# Patient Record
Sex: FEMALE | Race: BLACK OR AFRICAN AMERICAN | Employment: OTHER | URBAN - METROPOLITAN AREA
[De-identification: names, ages, dates, MRNs, and addresses within clinical notes are randomized per-mention and may not be internally consistent; named-entity substitution may affect disease eponyms.]

---

## 2023-01-19 ENCOUNTER — HOSPITAL ENCOUNTER (INPATIENT)
Age: 78
LOS: 7 days | Discharge: HOME HEALTH | DRG: 637 | End: 2023-01-26
Attending: STUDENT IN AN ORGANIZED HEALTH CARE EDUCATION/TRAINING PROGRAM | Admitting: ANESTHESIOLOGY
Payer: MEDICARE

## 2023-01-19 ENCOUNTER — APPOINTMENT (OUTPATIENT)
Dept: GENERAL RADIOLOGY | Age: 78
DRG: 637 | End: 2023-01-19
Attending: NURSE PRACTITIONER
Payer: MEDICARE

## 2023-01-19 ENCOUNTER — HOSPITAL ENCOUNTER (EMERGENCY)
Age: 78
Discharge: ANOTHER HOSPITAL | End: 2023-01-19
Attending: STUDENT IN AN ORGANIZED HEALTH CARE EDUCATION/TRAINING PROGRAM
Payer: MEDICARE

## 2023-01-19 ENCOUNTER — APPOINTMENT (OUTPATIENT)
Dept: GENERAL RADIOLOGY | Age: 78
End: 2023-01-19
Attending: STUDENT IN AN ORGANIZED HEALTH CARE EDUCATION/TRAINING PROGRAM
Payer: MEDICARE

## 2023-01-19 ENCOUNTER — APPOINTMENT (OUTPATIENT)
Dept: CT IMAGING | Age: 78
End: 2023-01-19
Attending: STUDENT IN AN ORGANIZED HEALTH CARE EDUCATION/TRAINING PROGRAM
Payer: MEDICARE

## 2023-01-19 VITALS
HEIGHT: 63 IN | RESPIRATION RATE: 23 BRPM | SYSTOLIC BLOOD PRESSURE: 137 MMHG | WEIGHT: 200 LBS | OXYGEN SATURATION: 96 % | DIASTOLIC BLOOD PRESSURE: 91 MMHG | BODY MASS INDEX: 35.44 KG/M2 | HEART RATE: 82 BPM | TEMPERATURE: 98.5 F

## 2023-01-19 DIAGNOSIS — R56.9 SEIZURES (HCC): ICD-10-CM

## 2023-01-19 DIAGNOSIS — E11.65 TYPE 2 DIABETES MELLITUS WITH HYPERGLYCEMIA, UNSPECIFIED WHETHER LONG TERM INSULIN USE (HCC): ICD-10-CM

## 2023-01-19 DIAGNOSIS — R73.9 HYPERGLYCEMIA: ICD-10-CM

## 2023-01-19 DIAGNOSIS — E87.1: Primary | ICD-10-CM

## 2023-01-19 DIAGNOSIS — G40.901 NON-CONVULSIVE STATUS EPILEPTICUS (HCC): ICD-10-CM

## 2023-01-19 DIAGNOSIS — R56.9 SEIZURE (HCC): Primary | ICD-10-CM

## 2023-01-19 LAB
ABO + RH BLD: NORMAL
ADMINISTERED INITIALS, ADMINIT: NORMAL
ALBUMIN SERPL-MCNC: 3.3 G/DL (ref 3.5–5)
ALBUMIN SERPL-MCNC: 3.6 G/DL (ref 3.5–5)
ALBUMIN SERPL-MCNC: 3.8 G/DL (ref 3.5–5)
ALBUMIN/GLOB SERPL: 0.8 (ref 1.1–2.2)
ALBUMIN/GLOB SERPL: 1 (ref 1.1–2.2)
ALBUMIN/GLOB SERPL: 1 (ref 1.1–2.2)
ALP SERPL-CCNC: 480 U/L (ref 45–117)
ALP SERPL-CCNC: 533 U/L (ref 45–117)
ALP SERPL-CCNC: 535 U/L (ref 45–117)
ALT SERPL-CCNC: 298 U/L (ref 12–78)
ALT SERPL-CCNC: 348 U/L (ref 12–78)
ALT SERPL-CCNC: ABNORMAL U/L (ref 12–78)
ANION GAP SERPL CALC-SCNC: 10 MMOL/L (ref 5–15)
ANION GAP SERPL CALC-SCNC: 11 MMOL/L (ref 5–15)
ANION GAP SERPL CALC-SCNC: 12 MMOL/L (ref 5–15)
ANION GAP SERPL CALC-SCNC: 17 MMOL/L (ref 5–15)
ANION GAP SERPL CALC-SCNC: 7 MMOL/L (ref 5–15)
APPEARANCE UR: CLEAR
APTT PPP: 24.4 SEC (ref 21.2–34.1)
ARTERIAL PATENCY WRIST A: YES
AST SERPL W P-5'-P-CCNC: 809 U/L (ref 15–37)
AST SERPL W P-5'-P-CCNC: ABNORMAL U/L (ref 15–37)
AST SERPL-CCNC: 586 U/L (ref 15–37)
BACTERIA URNS QL MICRO: NEGATIVE /HPF
BASE EXCESS BLDA CALC-SCNC: 1.7 MMOL/L
BASE EXCESS BLDV CALC-SCNC: 0.2 MMOL/L (ref 0–3)
BASOPHILS # BLD: 0 K/UL (ref 0–0.1)
BASOPHILS NFR BLD: 1 % (ref 0–1)
BDY SITE: ABNORMAL
BDY SITE: ABNORMAL
BILIRUB SERPL-MCNC: 0.5 MG/DL (ref 0.2–1)
BILIRUB SERPL-MCNC: 0.8 MG/DL (ref 0.2–1)
BILIRUB SERPL-MCNC: 1.1 MG/DL (ref 0.2–1)
BILIRUB UR QL: NEGATIVE
BLOOD GROUP ANTIBODIES SERPL: NEGATIVE
BNP SERPL-MCNC: 229 PG/ML
BODY TEMPERATURE: 98.1
BUN SERPL-MCNC: 26 MG/DL (ref 6–20)
BUN SERPL-MCNC: 29 MG/DL (ref 6–20)
BUN SERPL-MCNC: 29 MG/DL (ref 6–20)
BUN SERPL-MCNC: 32 MG/DL (ref 6–20)
BUN SERPL-MCNC: 34 MG/DL (ref 6–20)
BUN/CREAT SERPL: 13 (ref 12–20)
BUN/CREAT SERPL: 13 (ref 12–20)
BUN/CREAT SERPL: 14 (ref 12–20)
BUN/CREAT SERPL: 14 (ref 12–20)
BUN/CREAT SERPL: 15 (ref 12–20)
CA-I BLD-MCNC: 8.5 MG/DL (ref 8.5–10.1)
CA-I BLD-MCNC: 9.2 MG/DL (ref 8.5–10.1)
CA-I BLD-MCNC: 9.6 MG/DL (ref 8.5–10.1)
CALCIUM SERPL-MCNC: 9 MG/DL (ref 8.5–10.1)
CALCIUM SERPL-MCNC: 9.3 MG/DL (ref 8.5–10.1)
CHLORIDE SERPL-SCNC: 75 MMOL/L (ref 97–108)
CHLORIDE SERPL-SCNC: 79 MMOL/L (ref 97–108)
CHLORIDE SERPL-SCNC: 85 MMOL/L (ref 97–108)
CHLORIDE SERPL-SCNC: 94 MMOL/L (ref 97–108)
CHLORIDE SERPL-SCNC: 99 MMOL/L (ref 97–108)
CK SERPL-CCNC: NORMAL U/L (ref 26–192)
CO2 SERPL-SCNC: 22 MMOL/L (ref 21–32)
CO2 SERPL-SCNC: 26 MMOL/L (ref 21–32)
CO2 SERPL-SCNC: 27 MMOL/L (ref 21–32)
CO2 SERPL-SCNC: 29 MMOL/L (ref 21–32)
CO2 SERPL-SCNC: 29 MMOL/L (ref 21–32)
COLOR UR: ABNORMAL
CREAT SERPL-MCNC: 2 MG/DL (ref 0.55–1.02)
CREAT SERPL-MCNC: 2.05 MG/DL (ref 0.55–1.02)
CREAT SERPL-MCNC: 2.06 MG/DL (ref 0.55–1.02)
CREAT SERPL-MCNC: 2.31 MG/DL (ref 0.55–1.02)
CREAT SERPL-MCNC: 2.68 MG/DL (ref 0.55–1.02)
D50 ADMINISTERED, D50ADM: 0 ML
D50 ORDER, D50ORD: 0 ML
DIFFERENTIAL METHOD BLD: ABNORMAL
EOSINOPHIL # BLD: 0 K/UL (ref 0–0.4)
EOSINOPHIL NFR BLD: 0 % (ref 0–7)
EPITH CASTS URNS QL MICRO: ABNORMAL /LPF
ERYTHROCYTE [DISTWIDTH] IN BLOOD BY AUTOMATED COUNT: 12.2 % (ref 11.5–14.5)
ERYTHROCYTE [DISTWIDTH] IN BLOOD BY AUTOMATED COUNT: 12.5 % (ref 11.5–14.5)
FIO2 ON VENT: 50 %
GAS FLOW.O2 O2 DELIVERY SYS: 2 L/MIN
GAS FLOW.O2 SETTING OXYMISER: 16
GLOBULIN SER CALC-MCNC: 3.4 G/DL (ref 2–4)
GLOBULIN SER CALC-MCNC: 4 G/DL (ref 2–4)
GLOBULIN SER CALC-MCNC: 4.7 G/DL (ref 2–4)
GLUCOSE BLD STRIP.AUTO-MCNC: 134 MG/DL (ref 65–117)
GLUCOSE BLD STRIP.AUTO-MCNC: 165 MG/DL (ref 65–117)
GLUCOSE BLD STRIP.AUTO-MCNC: 212 MG/DL (ref 65–117)
GLUCOSE BLD STRIP.AUTO-MCNC: 500 MG/DL (ref 65–117)
GLUCOSE BLD STRIP.AUTO-MCNC: 538 MG/DL (ref 65–117)
GLUCOSE BLD STRIP.AUTO-MCNC: 541 MG/DL (ref 65–117)
GLUCOSE BLD STRIP.AUTO-MCNC: >600 MG/DL (ref 65–100)
GLUCOSE BLD STRIP.AUTO-MCNC: >600 MG/DL (ref 65–100)
GLUCOSE BLD STRIP.AUTO-MCNC: >600 MG/DL (ref 65–117)
GLUCOSE SERPL-MCNC: 1157 MG/DL (ref 65–100)
GLUCOSE SERPL-MCNC: 1217 MG/DL (ref 65–100)
GLUCOSE SERPL-MCNC: 381 MG/DL (ref 65–100)
GLUCOSE SERPL-MCNC: 792 MG/DL (ref 65–100)
GLUCOSE SERPL-MCNC: ABNORMAL MG/DL (ref 65–100)
GLUCOSE UR STRIP.AUTO-MCNC: >1000 MG/DL
GLUCOSE, GLC: 134 MG/DL
GLUCOSE, GLC: 165 MG/DL
GLUCOSE, GLC: 212 MG/DL
GLUCOSE, GLC: 500 MG/DL
GLUCOSE, GLC: 538 MG/DL
GLUCOSE, GLC: 541 MG/DL
GLUCOSE, GLC: 601 MG/DL
GLUCOSE, GLC: 601 MG/DL
HCO3 BLDA-SCNC: 26 MMOL/L (ref 22–26)
HCO3 BLDV-SCNC: 29 MMOL/L (ref 24–25)
HCT VFR BLD AUTO: 33.7 % (ref 35–47)
HCT VFR BLD AUTO: 40.3 % (ref 35–47)
HGB BLD-MCNC: 11 G/DL (ref 11.5–16)
HGB BLD-MCNC: 12.3 G/DL (ref 11.5–16)
HGB UR QL STRIP: ABNORMAL
HIGH TARGET, HITG: 200 MG/DL
IMM GRANULOCYTES # BLD AUTO: 0 K/UL (ref 0–0.04)
IMM GRANULOCYTES NFR BLD AUTO: 1 % (ref 0–0.5)
INR PPP: 1 (ref 0.9–1.1)
INSULIN ADMINSTERED, INSADM: 10.8 UNITS/HOUR
INSULIN ADMINSTERED, INSADM: 16.2 UNITS/HOUR
INSULIN ADMINSTERED, INSADM: 19.2 UNITS/HOUR
INSULIN ADMINSTERED, INSADM: 23.9 UNITS/HOUR
INSULIN ADMINSTERED, INSADM: 26.4 UNITS/HOUR
INSULIN ADMINSTERED, INSADM: 3.6 UNITS/HOUR
INSULIN ADMINSTERED, INSADM: 5 UNITS/HOUR
INSULIN ADMINSTERED, INSADM: 9.1 UNITS/HOUR
INSULIN ORDER, INSORD: 10.8 UNITS/HOUR
INSULIN ORDER, INSORD: 16.2 UNITS/HOUR
INSULIN ORDER, INSORD: 19.2 UNITS/HOUR
INSULIN ORDER, INSORD: 23.9 UNITS/HOUR
INSULIN ORDER, INSORD: 26.4 UNITS/HOUR
INSULIN ORDER, INSORD: 3.6 UNITS/HOUR
INSULIN ORDER, INSORD: 5 UNITS/HOUR
INSULIN ORDER, INSORD: 9.1 UNITS/HOUR
KETONES UR QL STRIP.AUTO: NEGATIVE MG/DL
LACTATE SERPL-SCNC: 2.4 MMOL/L (ref 0.4–2)
LACTATE SERPL-SCNC: 3.8 MMOL/L (ref 0.4–2)
LEUKOCYTE ESTERASE UR QL STRIP.AUTO: NEGATIVE
LOW TARGET, LOT: 150 MG/DL
LYMPHOCYTES # BLD: 0.9 K/UL (ref 0.8–3.5)
LYMPHOCYTES NFR BLD: 11 % (ref 12–49)
MAGNESIUM SERPL-MCNC: 2 MG/DL (ref 1.6–2.4)
MAGNESIUM SERPL-MCNC: 2.3 MG/DL (ref 1.6–2.4)
MAGNESIUM SERPL-MCNC: 2.3 MG/DL (ref 1.6–2.4)
MCH RBC QN AUTO: 27.7 PG (ref 26–34)
MCH RBC QN AUTO: 28.4 PG (ref 26–34)
MCHC RBC AUTO-ENTMCNC: 30.5 G/DL (ref 30–36.5)
MCHC RBC AUTO-ENTMCNC: 32.6 G/DL (ref 30–36.5)
MCV RBC AUTO: 84.9 FL (ref 80–99)
MCV RBC AUTO: 93.1 FL (ref 80–99)
MINUTES UNTIL NEXT BG, NBG: 60 MIN
MONOCYTES # BLD: 0.7 K/UL (ref 0–1)
MONOCYTES NFR BLD: 8 % (ref 5–13)
MULTIPLIER, MUL: 0.02
MULTIPLIER, MUL: 0.03
MULTIPLIER, MUL: 0.04
MULTIPLIER, MUL: 0.05
MULTIPLIER, MUL: 0.06
MULTIPLIER, MUL: 0.06
NEUTS SEG # BLD: 6.7 K/UL (ref 1.8–8)
NEUTS SEG NFR BLD: 79 % (ref 32–75)
NITRITE UR QL STRIP.AUTO: NEGATIVE
NRBC # BLD: 0 K/UL (ref 0–0.01)
NRBC # BLD: 0 K/UL (ref 0–0.01)
NRBC BLD-RTO: 0 PER 100 WBC
NRBC BLD-RTO: 0 PER 100 WBC
ORDER INITIALS, ORDINIT: NORMAL
PCO2 BLDA: 41 MMHG (ref 35–45)
PCO2 BLDV: 68.9 MMHG (ref 41–51)
PEEP RESPIRATORY: 5
PERFORMED BY, TECHID: ABNORMAL
PH BLDA: 7.42 (ref 7.35–7.45)
PH BLDV: 7.25 (ref 7.32–7.42)
PH UR STRIP: 5.5 (ref 5–8)
PHOSPHATE SERPL-MCNC: 2 MG/DL (ref 2.6–4.7)
PHOSPHATE SERPL-MCNC: 3.2 MG/DL (ref 2.6–4.7)
PHOSPHATE SERPL-MCNC: 4.7 MG/DL (ref 2.6–4.7)
PLATELET # BLD AUTO: 299 K/UL (ref 150–400)
PLATELET # BLD AUTO: 357 K/UL (ref 150–400)
PMV BLD AUTO: 11.2 FL (ref 8.9–12.9)
PMV BLD AUTO: 12.2 FL (ref 8.9–12.9)
PO2 BLDA: 111 MMHG (ref 80–100)
PO2 BLDV: 68 MMHG (ref 25–40)
POTASSIUM SERPL-SCNC: 3.1 MMOL/L (ref 3.5–5.1)
POTASSIUM SERPL-SCNC: 3.5 MMOL/L (ref 3.5–5.1)
POTASSIUM SERPL-SCNC: 4.1 MMOL/L (ref 3.5–5.1)
POTASSIUM SERPL-SCNC: 4.3 MMOL/L (ref 3.5–5.1)
POTASSIUM SERPL-SCNC: ABNORMAL MMOL/L (ref 3.5–5.1)
PROT SERPL-MCNC: 6.7 G/DL (ref 6.4–8.2)
PROT SERPL-MCNC: 7.8 G/DL (ref 6.4–8.2)
PROT SERPL-MCNC: 8.3 G/DL (ref 6.4–8.2)
PROT UR STRIP-MCNC: NEGATIVE MG/DL
PROTHROMBIN TIME: 13.4 SEC (ref 11.9–14.6)
RBC # BLD AUTO: 3.97 M/UL (ref 3.8–5.2)
RBC # BLD AUTO: 4.33 M/UL (ref 3.8–5.2)
RBC #/AREA URNS HPF: ABNORMAL /HPF (ref 0–5)
SAO2 % BLD: 98 % (ref 92–97)
SAO2% DEVICE SAO2% SENSOR NAME: ABNORMAL
SAO2% DEVICE SAO2% SENSOR NAME: ABNORMAL
SERVICE CMNT-IMP: ABNORMAL
SODIUM SERPL-SCNC: 114 MMOL/L (ref 136–145)
SODIUM SERPL-SCNC: 120 MMOL/L (ref 136–145)
SODIUM SERPL-SCNC: 121 MMOL/L (ref 136–145)
SODIUM SERPL-SCNC: 132 MMOL/L (ref 136–145)
SODIUM SERPL-SCNC: 135 MMOL/L (ref 136–145)
SPECIMEN EXP DATE BLD: NORMAL
SPECIMEN SITE: ABNORMAL
SPECIMEN SITE: ABNORMAL
THERAPEUTIC RANGE,PTTT: NORMAL SEC (ref 82–109)
TROPONIN-HIGH SENSITIVITY: 61 NG/L (ref 0–51)
TROPONIN-HIGH SENSITIVITY: 64 NG/L (ref 0–51)
UROBILINOGEN UR QL STRIP.AUTO: 0.2 EU/DL (ref 0.2–1)
VT SETTING VENT: 370
WBC # BLD AUTO: 8.3 K/UL (ref 3.6–11)
WBC # BLD AUTO: 9.2 K/UL (ref 3.6–11)
WBC URNS QL MICRO: ABNORMAL /HPF (ref 0–4)

## 2023-01-19 PROCEDURE — 83605 ASSAY OF LACTIC ACID: CPT

## 2023-01-19 PROCEDURE — 82962 GLUCOSE BLOOD TEST: CPT

## 2023-01-19 PROCEDURE — 96374 THER/PROPH/DIAG INJ IV PUSH: CPT

## 2023-01-19 PROCEDURE — 82550 ASSAY OF CK (CPK): CPT

## 2023-01-19 PROCEDURE — 81001 URINALYSIS AUTO W/SCOPE: CPT

## 2023-01-19 PROCEDURE — 36415 COLL VENOUS BLD VENIPUNCTURE: CPT

## 2023-01-19 PROCEDURE — 36600 WITHDRAWAL OF ARTERIAL BLOOD: CPT

## 2023-01-19 PROCEDURE — 84100 ASSAY OF PHOSPHORUS: CPT

## 2023-01-19 PROCEDURE — 74011250636 HC RX REV CODE- 250/636

## 2023-01-19 PROCEDURE — 71045 X-RAY EXAM CHEST 1 VIEW: CPT

## 2023-01-19 PROCEDURE — 83735 ASSAY OF MAGNESIUM: CPT

## 2023-01-19 PROCEDURE — 70496 CT ANGIOGRAPHY HEAD: CPT

## 2023-01-19 PROCEDURE — 74011000250 HC RX REV CODE- 250: Performed by: NURSE PRACTITIONER

## 2023-01-19 PROCEDURE — 74011250636 HC RX REV CODE- 250/636: Performed by: NURSE PRACTITIONER

## 2023-01-19 PROCEDURE — 74011250636 HC RX REV CODE- 250/636: Performed by: ANESTHESIOLOGY

## 2023-01-19 PROCEDURE — 82803 BLOOD GASES ANY COMBINATION: CPT

## 2023-01-19 PROCEDURE — 93005 ELECTROCARDIOGRAM TRACING: CPT

## 2023-01-19 PROCEDURE — 65610000006 HC RM INTENSIVE CARE

## 2023-01-19 PROCEDURE — 86900 BLOOD TYPING SEROLOGIC ABO: CPT

## 2023-01-19 PROCEDURE — 99285 EMERGENCY DEPT VISIT HI MDM: CPT

## 2023-01-19 PROCEDURE — C9254 INJECTION, LACOSAMIDE: HCPCS | Performed by: NURSE PRACTITIONER

## 2023-01-19 PROCEDURE — 83036 HEMOGLOBIN GLYCOSYLATED A1C: CPT

## 2023-01-19 PROCEDURE — 74011000250 HC RX REV CODE- 250: Performed by: STUDENT IN AN ORGANIZED HEALTH CARE EDUCATION/TRAINING PROGRAM

## 2023-01-19 PROCEDURE — 74011000258 HC RX REV CODE- 258: Performed by: NURSE PRACTITIONER

## 2023-01-19 PROCEDURE — 80053 COMPREHEN METABOLIC PANEL: CPT

## 2023-01-19 PROCEDURE — 80048 BASIC METABOLIC PNL TOTAL CA: CPT

## 2023-01-19 PROCEDURE — 85025 COMPLETE CBC W/AUTO DIFF WBC: CPT

## 2023-01-19 PROCEDURE — 84484 ASSAY OF TROPONIN QUANT: CPT

## 2023-01-19 PROCEDURE — 70450 CT HEAD/BRAIN W/O DYE: CPT

## 2023-01-19 PROCEDURE — 31500 INSERT EMERGENCY AIRWAY: CPT

## 2023-01-19 PROCEDURE — 83880 ASSAY OF NATRIURETIC PEPTIDE: CPT

## 2023-01-19 PROCEDURE — 74011250637 HC RX REV CODE- 250/637: Performed by: NURSE PRACTITIONER

## 2023-01-19 PROCEDURE — 95816 EEG AWAKE AND DROWSY: CPT | Performed by: NURSE PRACTITIONER

## 2023-01-19 PROCEDURE — 85027 COMPLETE CBC AUTOMATED: CPT

## 2023-01-19 PROCEDURE — 95816 EEG AWAKE AND DROWSY: CPT | Performed by: PSYCHIATRY & NEUROLOGY

## 2023-01-19 PROCEDURE — 74011636637 HC RX REV CODE- 636/637: Performed by: NURSE PRACTITIONER

## 2023-01-19 PROCEDURE — 74011636637 HC RX REV CODE- 636/637: Performed by: STUDENT IN AN ORGANIZED HEALTH CARE EDUCATION/TRAINING PROGRAM

## 2023-01-19 PROCEDURE — 74011000636 HC RX REV CODE- 636: Performed by: STUDENT IN AN ORGANIZED HEALTH CARE EDUCATION/TRAINING PROGRAM

## 2023-01-19 PROCEDURE — 85610 PROTHROMBIN TIME: CPT

## 2023-01-19 PROCEDURE — 95706 EEG WO VID 2-12HR INTMT MNTR: CPT | Performed by: NURSE PRACTITIONER

## 2023-01-19 PROCEDURE — 74011250636 HC RX REV CODE- 250/636: Performed by: STUDENT IN AN ORGANIZED HEALTH CARE EDUCATION/TRAINING PROGRAM

## 2023-01-19 PROCEDURE — 85730 THROMBOPLASTIN TIME PARTIAL: CPT

## 2023-01-19 PROCEDURE — 96375 TX/PRO/DX INJ NEW DRUG ADDON: CPT

## 2023-01-19 RX ORDER — LEVETIRACETAM 500 MG/5ML
500 INJECTION, SOLUTION, CONCENTRATE INTRAVENOUS EVERY 12 HOURS
Status: DISCONTINUED | OUTPATIENT
Start: 2023-01-20 | End: 2023-01-20

## 2023-01-19 RX ORDER — IBUPROFEN 200 MG
4 TABLET ORAL AS NEEDED
Status: DISCONTINUED | OUTPATIENT
Start: 2023-01-19 | End: 2023-01-20

## 2023-01-19 RX ORDER — LEVETIRACETAM 500 MG/5ML
2000 INJECTION, SOLUTION, CONCENTRATE INTRAVENOUS EVERY 12 HOURS
Status: DISCONTINUED | OUTPATIENT
Start: 2023-01-19 | End: 2023-01-19 | Stop reason: HOSPADM

## 2023-01-19 RX ORDER — SODIUM CHLORIDE, SODIUM LACTATE, POTASSIUM CHLORIDE, CALCIUM CHLORIDE 600; 310; 30; 20 MG/100ML; MG/100ML; MG/100ML; MG/100ML
150 INJECTION, SOLUTION INTRAVENOUS CONTINUOUS
Status: DISCONTINUED | OUTPATIENT
Start: 2023-01-19 | End: 2023-01-19

## 2023-01-19 RX ORDER — NYSTATIN 100000 U/G
OINTMENT TOPICAL 2 TIMES DAILY
Status: DISCONTINUED | OUTPATIENT
Start: 2023-01-19 | End: 2023-01-26 | Stop reason: HOSPADM

## 2023-01-19 RX ORDER — MIDAZOLAM IN 0.9 % SOD.CHLORID 1 MG/ML
0-10 PLASTIC BAG, INJECTION (ML) INTRAVENOUS
Status: DISCONTINUED | OUTPATIENT
Start: 2023-01-19 | End: 2023-01-19 | Stop reason: HOSPADM

## 2023-01-19 RX ORDER — ONDANSETRON 2 MG/ML
4 INJECTION INTRAMUSCULAR; INTRAVENOUS
Status: DISCONTINUED | OUTPATIENT
Start: 2023-01-19 | End: 2023-01-26 | Stop reason: HOSPADM

## 2023-01-19 RX ORDER — IBUPROFEN 200 MG
4 TABLET ORAL AS NEEDED
Status: DISCONTINUED | OUTPATIENT
Start: 2023-01-19 | End: 2023-01-19 | Stop reason: HOSPADM

## 2023-01-19 RX ORDER — ACETAMINOPHEN 650 MG/1
650 SUPPOSITORY RECTAL
Status: DISCONTINUED | OUTPATIENT
Start: 2023-01-19 | End: 2023-01-26 | Stop reason: HOSPADM

## 2023-01-19 RX ORDER — PROPOFOL 10 MG/ML
0-50 VIAL (ML) INTRAVENOUS
Status: DISCONTINUED | OUTPATIENT
Start: 2023-01-19 | End: 2023-01-21

## 2023-01-19 RX ORDER — CHLORHEXIDINE GLUCONATE 0.12 MG/ML
15 RINSE ORAL EVERY 12 HOURS
Status: DISCONTINUED | OUTPATIENT
Start: 2023-01-19 | End: 2023-01-22

## 2023-01-19 RX ORDER — ACETAMINOPHEN 325 MG/1
650 TABLET ORAL
Status: DISCONTINUED | OUTPATIENT
Start: 2023-01-19 | End: 2023-01-26 | Stop reason: HOSPADM

## 2023-01-19 RX ORDER — ONDANSETRON 4 MG/1
4 TABLET, ORALLY DISINTEGRATING ORAL
Status: DISCONTINUED | OUTPATIENT
Start: 2023-01-19 | End: 2023-01-26 | Stop reason: HOSPADM

## 2023-01-19 RX ORDER — DEXTROSE MONOHYDRATE 100 MG/ML
0-250 INJECTION, SOLUTION INTRAVENOUS AS NEEDED
Status: DISCONTINUED | OUTPATIENT
Start: 2023-01-19 | End: 2023-01-19 | Stop reason: HOSPADM

## 2023-01-19 RX ORDER — ETOMIDATE 2 MG/ML
20 INJECTION INTRAVENOUS ONCE
Status: COMPLETED | OUTPATIENT
Start: 2023-01-19 | End: 2023-01-19

## 2023-01-19 RX ORDER — POTASSIUM CHLORIDE 14.9 MG/ML
10 INJECTION INTRAVENOUS
Status: COMPLETED | OUTPATIENT
Start: 2023-01-19 | End: 2023-01-20

## 2023-01-19 RX ORDER — POLYETHYLENE GLYCOL 3350 17 G/17G
17 POWDER, FOR SOLUTION ORAL DAILY PRN
Status: DISCONTINUED | OUTPATIENT
Start: 2023-01-19 | End: 2023-01-26 | Stop reason: HOSPADM

## 2023-01-19 RX ORDER — LABETALOL HCL 20 MG/4 ML
10 SYRINGE (ML) INTRAVENOUS ONCE
Status: COMPLETED | OUTPATIENT
Start: 2023-01-19 | End: 2023-01-19

## 2023-01-19 RX ORDER — DEXTROSE MONOHYDRATE AND SODIUM CHLORIDE 5; .45 G/100ML; G/100ML
75 INJECTION, SOLUTION INTRAVENOUS CONTINUOUS
Status: DISCONTINUED | OUTPATIENT
Start: 2023-01-19 | End: 2023-01-21

## 2023-01-19 RX ORDER — LORAZEPAM 2 MG/ML
2 INJECTION INTRAMUSCULAR ONCE
Status: COMPLETED | OUTPATIENT
Start: 2023-01-19 | End: 2023-01-19

## 2023-01-19 RX ORDER — LORAZEPAM 2 MG/ML
INJECTION INTRAMUSCULAR
Status: COMPLETED
Start: 2023-01-19 | End: 2023-01-19

## 2023-01-19 RX ORDER — SODIUM CHLORIDE 0.9 % (FLUSH) 0.9 %
5-40 SYRINGE (ML) INJECTION AS NEEDED
Status: DISCONTINUED | OUTPATIENT
Start: 2023-01-19 | End: 2023-01-26 | Stop reason: HOSPADM

## 2023-01-19 RX ORDER — POTASSIUM CHLORIDE 7.45 MG/ML
10 INJECTION INTRAVENOUS ONCE
Status: DISCONTINUED | OUTPATIENT
Start: 2023-01-19 | End: 2023-01-19 | Stop reason: HOSPADM

## 2023-01-19 RX ORDER — HEPARIN SODIUM 5000 [USP'U]/ML
5000 INJECTION, SOLUTION INTRAVENOUS; SUBCUTANEOUS EVERY 8 HOURS
Status: DISCONTINUED | OUTPATIENT
Start: 2023-01-19 | End: 2023-01-26 | Stop reason: HOSPADM

## 2023-01-19 RX ORDER — LACOSAMIDE 10 MG/ML
200 INJECTION, SOLUTION INTRAVENOUS ONCE
Status: COMPLETED | OUTPATIENT
Start: 2023-01-19 | End: 2023-01-19

## 2023-01-19 RX ORDER — SODIUM CHLORIDE 0.9 % (FLUSH) 0.9 %
5-40 SYRINGE (ML) INJECTION EVERY 8 HOURS
Status: DISCONTINUED | OUTPATIENT
Start: 2023-01-19 | End: 2023-01-26 | Stop reason: HOSPADM

## 2023-01-19 RX ORDER — FAMOTIDINE 20 MG/1
20 TABLET, FILM COATED ORAL DAILY
Status: DISCONTINUED | OUTPATIENT
Start: 2023-01-20 | End: 2023-01-22

## 2023-01-19 RX ORDER — ROCURONIUM BROMIDE 10 MG/ML
100 INJECTION, SOLUTION INTRAVENOUS
Status: COMPLETED | OUTPATIENT
Start: 2023-01-19 | End: 2023-01-19

## 2023-01-19 RX ADMIN — LEVETIRACETAM 500 MG: 100 INJECTION, SOLUTION, CONCENTRATE INTRAVENOUS at 23:08

## 2023-01-19 RX ADMIN — SODIUM CHLORIDE, PRESERVATIVE FREE 10 ML: 5 INJECTION INTRAVENOUS at 21:13

## 2023-01-19 RX ADMIN — PROPOFOL 25 MCG/KG/MIN: 10 INJECTION, EMULSION INTRAVENOUS at 16:42

## 2023-01-19 RX ADMIN — PROPOFOL 25 MCG/KG/MIN: 10 INJECTION, EMULSION INTRAVENOUS at 22:58

## 2023-01-19 RX ADMIN — ROCURONIUM BROMIDE 100 MG: 50 INJECTION, SOLUTION INTRAVENOUS at 14:57

## 2023-01-19 RX ADMIN — CHLORHEXIDINE GLUCONATE 15 ML: 1.2 RINSE ORAL at 21:05

## 2023-01-19 RX ADMIN — NYSTATIN OINTMENT: 100000 OINTMENT TOPICAL at 17:23

## 2023-01-19 RX ADMIN — SODIUM CHLORIDE, POTASSIUM CHLORIDE, SODIUM LACTATE AND CALCIUM CHLORIDE 1000 ML: 600; 310; 30; 20 INJECTION, SOLUTION INTRAVENOUS at 22:03

## 2023-01-19 RX ADMIN — LORAZEPAM 2 MG: 2 INJECTION INTRAMUSCULAR at 11:58

## 2023-01-19 RX ADMIN — SODIUM CHLORIDE 1000 ML: 9 INJECTION, SOLUTION INTRAVENOUS at 14:25

## 2023-01-19 RX ADMIN — SODIUM CHLORIDE 10.8 UNITS/HR: 9 INJECTION, SOLUTION INTRAVENOUS at 16:57

## 2023-01-19 RX ADMIN — SODIUM CHLORIDE, POTASSIUM CHLORIDE, SODIUM LACTATE AND CALCIUM CHLORIDE 1000 ML: 600; 310; 30; 20 INJECTION, SOLUTION INTRAVENOUS at 16:35

## 2023-01-19 RX ADMIN — SODIUM CHLORIDE 23.9 UNITS/HR: 9 INJECTION, SOLUTION INTRAVENOUS at 20:00

## 2023-01-19 RX ADMIN — SODIUM CHLORIDE, POTASSIUM CHLORIDE, SODIUM LACTATE AND CALCIUM CHLORIDE 1000 ML: 600; 310; 30; 20 INJECTION, SOLUTION INTRAVENOUS at 18:29

## 2023-01-19 RX ADMIN — SODIUM CHLORIDE, PRESERVATIVE FREE 10 ML: 5 INJECTION INTRAVENOUS at 17:23

## 2023-01-19 RX ADMIN — LACOSAMIDE 200 MG: 10 INJECTION INTRAVENOUS at 23:11

## 2023-01-19 RX ADMIN — LEVETIRACETAM 2000 MG: 100 INJECTION INTRAVENOUS at 12:37

## 2023-01-19 RX ADMIN — IOPAMIDOL 100 ML: 755 INJECTION, SOLUTION INTRAVENOUS at 12:34

## 2023-01-19 RX ADMIN — POTASSIUM CHLORIDE 10 MEQ: 14.9 INJECTION, SOLUTION INTRAVENOUS at 23:07

## 2023-01-19 RX ADMIN — SODIUM CHLORIDE, POTASSIUM CHLORIDE, SODIUM LACTATE AND CALCIUM CHLORIDE 1000 ML: 600; 310; 30; 20 INJECTION, SOLUTION INTRAVENOUS at 21:12

## 2023-01-19 RX ADMIN — LORAZEPAM 2 MG: 2 INJECTION INTRAMUSCULAR; INTRAVENOUS at 11:58

## 2023-01-19 RX ADMIN — SODIUM CHLORIDE 3.6 UNITS/HR: 9 INJECTION, SOLUTION INTRAVENOUS at 23:04

## 2023-01-19 RX ADMIN — SODIUM CHLORIDE 1000 ML: 9 INJECTION, SOLUTION INTRAVENOUS at 13:08

## 2023-01-19 RX ADMIN — DEXTROSE AND SODIUM CHLORIDE 150 ML/HR: 5; 450 INJECTION, SOLUTION INTRAVENOUS at 23:15

## 2023-01-19 RX ADMIN — POTASSIUM CHLORIDE 10 MEQ: 14.9 INJECTION, SOLUTION INTRAVENOUS at 23:51

## 2023-01-19 RX ADMIN — LABETALOL HYDROCHLORIDE 10 MG: 5 INJECTION, SOLUTION INTRAVENOUS at 14:24

## 2023-01-19 RX ADMIN — HEPARIN SODIUM 5000 UNITS: 5000 INJECTION INTRAVENOUS; SUBCUTANEOUS at 17:37

## 2023-01-19 RX ADMIN — INSULIN HUMAN 10 UNITS: 100 INJECTION, SOLUTION PARENTERAL at 15:00

## 2023-01-19 RX ADMIN — ETOMIDATE 20 MG: 20 INJECTION, SOLUTION INTRAVENOUS at 14:56

## 2023-01-19 NOTE — PROGRESS NOTES
TRANSFER - IN REPORT:    Verbal report received from Psychiatric RN(name) on [employer]  being received from [facility] ED(unit) for urgent transfer      Report consisted of patients Situation, Background, Assessment and   Recommendations(SBAR). Information from the following report(s) SBAR, Kardex, ED Summary, Intake/Output, MAR, Recent Results, Cardiac Rhythm SR-ST, and Alarm Parameters  was reviewed with the receiving nurse. Opportunity for questions and clarification was provided. Assessment completed upon patients arrival to unit and care assumed. [address]: applied  Date/Time: 1/19/23 7286  Recorder: recording started  Skin: Intact  Highest Seizure [place] Percentage past hour: 0      General info regarding Seizure [place] %:  Minimum duration of study is 2 hours. If Seizure [place] has remained 0% throughout the entire 2-hour duration, communicate with provider to stop the recording. Seizure [place] 0-10% - Continue to monitor and complete 2-hour study. Seizure [place] 11-89% - Epileptiform activity present. Notify provider for next steps. Seizure [place] >/= 90% - Epileptiform activity consistent with Status Epilepticus. Immediately notify provider. *Patients with Seizure [place] above 10% that persists may require a study longer than 2 hours. Maximum recording duration is 24 hours. Please update provider with a persistent increase in Seizure [place] above 10%.      1802 EEG tech at bedside, [address] removed. 1829 Lactic Acid 3.8, Dr. Tobias Kennedy notified. Orders received for 1L LR bolus. 1838 Pt biting ETT, asynchronous with ventilator, Dr. Tobias Kennedy notified. Orders received to start propofol gtt. 1930 Bedside shift change report given to Renita Agustin RN (oncoming nurse) by Nicholas Chávez RN (offgoing nurse). Report included the following information SBAR, Kardex, Intake/Output, MAR, Recent Results, Cardiac Rhythm SR, and Alarm Parameters .

## 2023-01-19 NOTE — ED NOTES
Patient seizing upon arrival. 2mg ativan given. CT delayted due to IV access for CTA. SOC to be called.  Doctor at bedside

## 2023-01-19 NOTE — H&P
History and Physical    Patient: Mark Hutchison MRN: [id number]  SSN: [id number]    YOB: 1945  Age: 78 y.o. Sex: female      Subjective:      Mark Hutchison is a 78 y.o. female who has a PMH of DM2 and HTN and was found on the floor by her family with LKW at 0800. Family called EMS and the patient had a witnessed seizure with R sided gaze deviation that aborted with 2mg IV ativan. She was intubated for airway protection. Glucose was found to be 1157, LA 2.38. Corrected NA was 147. She loaded with 2g of keppra and transferred to [facility]. Upon arrival to [facility], she was intubated, not on sedation at the time of arrival and not requiring vasopressors. CT head unrevealing. No past medical history on file. No past surgical history on file. No family history on file. Social History     Tobacco Use    Smoking status: Not on file    Smokeless tobacco: Not on file   Substance Use Topics    Alcohol use: Not on file      Prior to Admission medications    Not on File        No Known Allergies    Review of Systems:  Unable to obtain due to AMS    Objective: There were no vitals filed for this visit. Physical Exam:  GENERAL: Intubated; non-responsive  EYE: Pinpoint pupils, does not blink to threat  THROAT & NECK: Supple, no JVD  LUNG: CTAB, on MV, diminished bases  HEART: RRR, 2+ pulses, no edema  ABDOMEN: Obese, soft, nontender, nondistended  EXTREMITIES:  2+ pulses, no edema  SKIN: c/d/i  NEUROLOGIC: Does not open eyes, does not withdraw to painful stimuli in any extremity, does not exhibit purposeful or non-purposeful movement; No involuntary movement  PSYCHIATRIC: JODY    Assessment and Plan     Seizure: New onset, no prior history of seizure: Possibly due to severe hyperglycemia/HHS.  CT head negative  - Treatment of HHS as below  - Keppra  - EEG  - Will likely need MRI  - Neurology consult    HHS: Occurring in the setting of poorly controlled DM2:   - DKA protocol insulin drip  - LR bolus, continue to reassess need for additional fluid boluses, will likely need large amount of fluid resuscitation  - DM education when able  - A1c pending    Toxic metabolic encephalopathy: Due to Seizure/HHS/Delirium  - Treatment of underlying conditions    Pseudohyponatremia: Corrected  when adjusted for glucose  - Continue to trend BMP    JORGE: likely due to prerenal state  - Aggressive fluid resuscitation      CRITICAL CARE CONSULTANT ATTESTATION  I had a face to face encounter with the patient, reviewed and interpreted patient data including clinical events, labs, images, vital signs, I/O's, and examined patient. I have discussed the case and the plan and management of the patient's care with the consulting services, the bedside nurses and the respiratory therapist.       NOTE OF PERSONAL INVOLVEMENT IN CARE   This patient has a high probability of imminent, clinically significant deterioration, which requires the highest level of preparedness to intervene urgently. I participated in the decision-making and personally managed or directed the management of the following life and organ supporting interventions that required my frequent assessment to treat or prevent imminent deterioration. I personally spent 45 minutes of critical care time. This is time spent at this critically ill patient's bedside actively involved in patient care as well as the coordination of care and discussions with the patient's family. This does not include any procedural time which has been billed separately.     Mary Jo Pham NP  [facility]  1/19/2023          Signed By: Mary Jo Pham NP     January 19, 2023

## 2023-01-19 NOTE — ED PROVIDER NOTES
[facility] EMERGENCY DEPT  EMERGENCY DEPARTMENT HISTORY AND PHYSICAL EXAM      Date: 1/19/2023  Patient Name: Dedra Amato  MRN: [id number]  [place] 1945  Date of evaluation: 1/19/2023  Provider: Shana oV MD   Note Started: 6:37 PM 1/19/23    HISTORY OF PRESENT ILLNESS     Chief Complaint   Patient presents with    Stroke       History Provided By: Patient and EMS    HPI: Dedra Amato, 78 y.o. female with unknown past medical history presents for evaluation following altered mental status. Per EMS, last known well was 8 AM.  Patient was found unresponsive at home. No evidence of trauma. Patient currently unresponsive even to deep physical stimuli. She is protecting her airway, saturating well on room air    PAST MEDICAL HISTORY   Past Medical History:  History reviewed. No pertinent past medical history. Past Surgical History:  History reviewed. No pertinent surgical history. Family History:  No family history on file. Social History: Allergies:  No Known Allergies    PCP: None    Current Meds:   There are no discharge medications for this patient. REVIEW OF SYSTEMS   Review of Systems   Unable to perform ROS: Acuity of condition     Positives and Pertinent negatives as per HPI. PHYSICAL EXAM     ED Triage Vitals   ED Encounter Vitals Group      BP 01/19/23 1159 (!) 226/170      Pulse (Heart Rate) 01/19/23 1159 (!) 126      Resp Rate 01/19/23 1159 (!) 38      Temp 01/19/23 1236 98.6 °F (37 °C)      Temp src --       O2 Sat (%) 01/19/23 1159 100 %      Weight 01/19/23 1159 200 lb      Height 01/19/23 1159 5' 3\"     Physical Exam  Constitutional:       Comments: Unconscious   HENT:      Head: Normocephalic and atraumatic. Eyes:      Comments: Eyes deviated to the lower left   Cardiovascular:      Rate and Rhythm: Normal rate and regular rhythm. Pulmonary:      Effort: Pulmonary effort is normal. No respiratory distress. Breath sounds: Normal breath sounds. Abdominal:      General: There is no distension. Musculoskeletal:         General: No tenderness or deformity. Cervical back: Neck supple. Skin:     General: Skin is warm and dry. Neurological:      Comments: Rhythmic shaking in all extremities          SCREENINGS               No data recorded        LAB, EKG AND DIAGNOSTIC RESULTS   Labs:  Recent Results (from the past 12 hour(s))   CBC WITH AUTOMATED DIFF    Collection Time: 01/19/23 12:13 PM   Result Value Ref Range    WBC 8.3 3.6 - 11.0 K/uL    RBC 4.33 3.80 - 5.20 M/uL    HGB 12.3 11.5 - 16.0 g/dL    HCT 40.3 35.0 - 47.0 %    MCV 93.1 80.0 - 99.0 FL    MCH 28.4 26.0 - 34.0 PG    MCHC 30.5 30.0 - 36.5 g/dL    RDW 12.5 11.5 - 14.5 %    PLATELET 855 956 - 284 K/uL    MPV 12.2 8.9 - 12.9 FL    NRBC 0.0 0.0  WBC    ABSOLUTE NRBC 0.00 0.00 - 0.01 K/uL    NEUTROPHILS 79 (H) 32 - 75 %    LYMPHOCYTES 11 (L) 12 - 49 %    MONOCYTES 8 5 - 13 %    EOSINOPHILS 0 0 - 7 %    BASOPHILS 1 0 - 1 %    IMMATURE GRANULOCYTES 1 (H) 0 - 0.5 %    ABS. NEUTROPHILS 6.7 1.8 - 8.0 K/UL    ABS. LYMPHOCYTES 0.9 0.8 - 3.5 K/UL    ABS. MONOCYTES 0.7 0.0 - 1.0 K/UL    ABS. EOSINOPHILS 0.0 0.0 - 0.4 K/UL    ABS. BASOPHILS 0.0 0.0 - 0.1 K/UL    ABS. IMM. GRANS. 0.0 0.00 - 0.04 K/UL    DF AUTOMATED     METABOLIC PANEL, COMPREHENSIVE    Collection Time: 01/19/23 12:13 PM   Result Value Ref Range    Sodium 114 (LL) 136 - 145 mmol/L    Potassium Hemolyzed, recollect requested 3.5 - 5.1 mmol/L    Chloride 75 (L) 97 - 108 mmol/L    CO2 22 21 - 32 mmol/L    Anion gap 17 (H) 5 - 15 mmol/L    Glucose Hemolyzed, recollect requested 65 - 100 mg/dL    BUN 34 (H) 6 - 20 mg/dL    Creatinine 2.68 (H) 0.55 - 1.02 mg/dL    BUN/Creatinine ratio 13 12 - 20      eGFR 18 (L) >60 ml/min/1.73m2    Calcium 9.6 8.5 - 10.1 mg/dL    Bilirubin, total 1.1 (H) 0.2 - 1.0 mg/dL    AST (SGOT) Hemolyzed, recollect requested 15 - 37 U/L    ALT (SGPT) Hemolyzed, recollect requested 12 - 78 U/L    Alk. phosphatase 535 (H) 45 - 117 U/L    Protein, total 8.3 (H) 6.4 - 8.2 g/dL    Albumin 3.6 3.5 - 5.0 g/dL    Globulin 4.7 (H) 2.0 - 4.0 g/dL    A-G Ratio 0.8 (L) 1.1 - 2.2     TROPONIN-HIGH SENSITIVITY    Collection Time: 01/19/23 12:13 PM   Result Value Ref Range    Troponin-High Sensitivity 61 (H) 0 - 51 ng/L   NT-PRO BNP    Collection Time: 01/19/23 12:13 PM   Result Value Ref Range    NT pro- <450 pg/mL   PROTHROMBIN TIME + INR    Collection Time: 01/19/23 12:13 PM   Result Value Ref Range    Prothrombin time 13.4 11.9 - 14.6 sec    INR 1.0 0.9 - 1.1     PTT    Collection Time: 01/19/23 12:13 PM   Result Value Ref Range    aPTT 24.4 21.2 - 34.1 sec    aPTT, therapeutic range   82 - 109 sec   TYPE & SCREEN    Collection Time: 01/19/23 12:13 PM   Result Value Ref Range    Crossmatch Expiration 01/22/2023,2359     ABO/Rh(D) A Positive     Antibody screen Negative    CK    Collection Time: 01/19/23 12:13 PM   Result Value Ref Range    CK Hemolyzed, recollect requested 26 - 192 U/L   GLUCOSE, POC    Collection Time: 01/19/23 12:45 PM   Result Value Ref Range    Glucose (POC) >600 (HH) 65 - 100 mg/dL    [facility]    METABOLIC PANEL, COMPREHENSIVE    Collection Time: 01/19/23  1:41 PM   Result Value Ref Range    Sodium 120 (L) 136 - 145 mmol/L    Potassium 4.1 3.5 - 5.1 mmol/L    Chloride 79 (L) 97 - 108 mmol/L    CO2 29 21 - 32 mmol/L    Anion gap 12 5 - 15 mmol/L    Glucose 1,217 (HH) 65 - 100 mg/dL    BUN 32 (H) 6 - 20 mg/dL    Creatinine 2.31 (H) 0.55 - 1.02 mg/dL    BUN/Creatinine ratio 14 12 - 20      eGFR 22 (L) >60 ml/min/1.73m2    Calcium 9.2 8.5 - 10.1 mg/dL    Bilirubin, total 0.8 0.2 - 1.0 mg/dL    AST (SGOT) 809 (H) 15 - 37 U/L    ALT (SGPT) 348 (H) 12 - 78 U/L    Alk.  phosphatase 533 (H) 45 - 117 U/L    Protein, total 7.8 6.4 - 8.2 g/dL    Albumin 3.8 3.5 - 5.0 g/dL    Globulin 4.0 2.0 - 4.0 g/dL    A-G Ratio 1.0 (L) 1.1 - 2.2     BLOOD GAS, VENOUS    Collection Time: 01/19/23  1:54 PM   Result Value Ref Range    VENOUS PH 7.245 (L) 7.32 - 7.42      VENOUS PCO2 68.9 (H) 41 - 51 mmHg    VENOUS PO2 68 (H) 25 - 40 mmHg    VENOUS BICARBONATE 29 (H) 24 - 25 mmol/L    VENOUS BASE EXCESS 0.2 0 - 3 mmol/L    O2 METHOD Nasal Cannula      O2 FLOW RATE 2.00 L/min    Sample source Venous      SITE OTHER      Performed by Muriel Brooke     TEMPERATURE 98.1     LACTIC ACID    Collection Time: 01/19/23  2:26 PM   Result Value Ref Range    Lactic acid 2.4 (HH) 0.4 - 2.0 mmol/L   METABOLIC PANEL, BASIC    Collection Time: 01/19/23  2:26 PM   Result Value Ref Range    Sodium 121 (L) 136 - 145 mmol/L    Potassium 4.3 3.5 - 5.1 mmol/L    Chloride 85 (L) 97 - 108 mmol/L    CO2 26 21 - 32 mmol/L    Anion gap 10 5 - 15 mmol/L    Glucose 1,157 (HH) 65 - 100 mg/dL    BUN 29 (H) 6 - 20 mg/dL    Creatinine 2.06 (H) 0.55 - 1.02 mg/dL    BUN/Creatinine ratio 14 12 - 20      eGFR 25 (L) >60 ml/min/1.73m2    Calcium 8.5 8.5 - 10.1 mg/dL   MAGNESIUM    Collection Time: 01/19/23  2:26 PM   Result Value Ref Range    Magnesium 2.3 1.6 - 2.4 mg/dL   PHOSPHORUS    Collection Time: 01/19/23  2:26 PM   Result Value Ref Range    Phosphorus 4.7 2.6 - 4.7 mg/dL   TROPONIN-HIGH SENSITIVITY    Collection Time: 01/19/23  2:26 PM   Result Value Ref Range    Troponin-High Sensitivity 64 (H) 0 - 51 ng/L   GLUCOSE, POC    Collection Time: 01/19/23  3:02 PM   Result Value Ref Range    Glucose (POC) >600 (HH) 65 - 100 mg/dL    Performed by Edward Gifford W/MICROSCOPIC    Collection Time: 01/19/23  4:40 PM   Result Value Ref Range    Color YELLOW/STRAW      Appearance CLEAR CLEAR      pH (UA) 5.5 5.0 - 8.0      Protein Negative NEG mg/dL    Glucose >1,000 (A) NEG mg/dL    Ketone Negative NEG mg/dL    Bilirubin Negative NEG      Blood TRACE (A) NEG      Urobilinogen 0.2 0.2 - 1.0 EU/dL    Nitrites Negative NEG      Leukocyte Esterase Negative NEG      WBC 0-4 0 - 4 /hpf    RBC 0-5 0 - 5 /hpf    Epithelial cells FEW FEW /lpf Bacteria Negative NEG /hpf   GLUCOSE, POC    Collection Time: 01/19/23  5:00 PM   Result Value Ref Range    Glucose (POC) >600 (HH) 65 - 117 mg/dL    Performed by Melina Levine RN    GLUCOSE, POC    Collection Time: 01/19/23  5:02 PM   Result Value Ref Range    Glucose (POC) >600 (HH) 65 - 117 mg/dL    Performed by Melina Levine RN    GLUCOSTABILIZER    Collection Time: 01/19/23  5:02 PM   Result Value Ref Range    Glucose 601 mg/dL    Insulin order 10.8 units/hour    Insulin adminstered 10.8 units/hour    Multiplier 0.020     Low target 150 mg/dL    High target 200 mg/dL    D50 order 0.0 ml    D50 administered 0.00 ml    Minutes until next BG 60 min    Order initials eb     Administered initials eb    LACTIC ACID    Collection Time: 01/19/23  5:16 PM   Result Value Ref Range    Lactic acid 3.8 (HH) 0.4 - 2.0 MMOL/L   MAGNESIUM    Collection Time: 01/19/23  5:18 PM   Result Value Ref Range    Magnesium 2.3 1.6 - 2.4 mg/dL   PHOSPHORUS    Collection Time: 01/19/23  5:18 PM   Result Value Ref Range    Phosphorus 3.2 2.6 - 4.7 MG/DL   CBC W/O DIFF    Collection Time: 01/19/23  5:18 PM   Result Value Ref Range    WBC 9.2 3.6 - 11.0 K/uL    RBC 3.97 3.80 - 5.20 M/uL    HGB 11.0 (L) 11.5 - 16.0 g/dL    HCT 33.7 (L) 35.0 - 47.0 %    MCV 84.9 80.0 - 99.0 FL    MCH 27.7 26.0 - 34.0 PG    MCHC 32.6 30.0 - 36.5 g/dL    RDW 12.2 11.5 - 14.5 %    PLATELET 529 081 - 438 K/uL    MPV 11.2 8.9 - 12.9 FL    NRBC 0.0 0  WBC    ABSOLUTE NRBC 0.00 0.00 - 0.80 K/uL   METABOLIC PANEL, COMPREHENSIVE    Collection Time: 01/19/23  5:18 PM   Result Value Ref Range    Sodium 132 (L) 136 - 145 mmol/L    Potassium 3.5 3.5 - 5.1 mmol/L    Chloride 94 (L) 97 - 108 mmol/L    CO2 27 21 - 32 mmol/L    Anion gap 11 5 - 15 mmol/L    Glucose 792 (HH) 65 - 100 mg/dL    BUN 29 (H) 6 - 20 MG/DL    Creatinine 2.00 (H) 0.55 - 1.02 MG/DL    BUN/Creatinine ratio 15 12 - 20      eGFR 26 (L) >60 ml/min/1.73m2    Calcium 9.0 8.5 - 10.1 MG/DL Bilirubin, total 0.5 0.2 - 1.0 MG/DL    ALT (SGPT) 298 (H) 12 - 78 U/L    AST (SGOT) 586 (H) 15 - 37 U/L    Alk. phosphatase 480 (H) 45 - 117 U/L    Protein, total 6.7 6.4 - 8.2 g/dL    Albumin 3.3 (L) 3.5 - 5.0 g/dL    Globulin 3.4 2.0 - 4.0 g/dL    A-G Ratio 1.0 (L) 1.1 - 2.2     BLOOD GAS, ARTERIAL    Collection Time: 01/19/23  5:20 PM   Result Value Ref Range    pH 7.42 7.35 - 7.45      PCO2 41 35 - 45 mmHg    PO2 111 (H) 80 - 100 mmHg    O2 SAT 98 (H) 92 - 97 %    BICARBONATE 26 22 - 26 mmol/L    BASE EXCESS 1.7 mmol/L    O2 METHOD VENT      FIO2 50 %    Tidal volume 370.0      SET RATE 16      PEEP/CPAP 5.0      Sample source ARTERIAL      SITE RIGHT RADIAL      ENRIQUE'S TEST YES     GLUCOSE, POC    Collection Time: 01/19/23  6:06 PM   Result Value Ref Range    Glucose (POC) >600 (HH) 65 - 117 mg/dL    Performed by Nicole Mojica RN    GLUCOSE, POC    Collection Time: 01/19/23  6:07 PM   Result Value Ref Range    Glucose (POC) >600 (HH) 65 - 117 mg/dL    Performed by Nicole Mojica RN    GLUCOSTABILIZER    Collection Time: 01/19/23  6:07 PM   Result Value Ref Range    Glucose 601 mg/dL    Insulin order 16.2 units/hour    Insulin adminstered 16.2 units/hour    Multiplier 0.030     Low target 150 mg/dL    High target 200 mg/dL    D50 order 0.0 ml    D50 administered 0.00 ml    Minutes until next BG 60 min    Order initials eb     Administered initials eb        Radiologic Studies:  Interpretation per the Radiologist below, if available at the time of this note:  XR CHEST SNGL V    Result Date: 1/19/2023  INDICATION: ams EXAM:  AP CHEST RADIOGRAPH COMPARISON: None FINDINGS: AP portable view of the chest demonstrates a normal cardiomediastinal silhouette. Right basilar atelectasis. Haziness left lung base likely due to body habitus/soft tissue attenuation. The osseous structures are unremarkable. Right basilar atelectasis. Probable soft tissue attenuation over the left lung base.  Consider PA and lateral views when possible. XR CHEST PORT    Result Date: 1/19/2023  Indication: ET tube placement Comparison to earlier the same day. Portable exam obtained at 1455 demonstrates interval placement of an ET tube, which projects just within the right mainstem bronchus. This should be retracted approximately 3 cm for optimal positioning. Retract ET tube approximately 3 cm for optimal positioning. CTA CODE NEURO HEAD AND NECK W CONT    Result Date: 1/19/2023  EXAM: CTA CODE NEURO HEAD AND NECK W CONT INDICATION: stroke COMPARISON: No comparisons. CONTRAST: 100 mL of Isovue-370. TECHNIQUE:  Unenhanced  images were obtained to localize the volume for acquisition. Multislice helical axial CT angiography was performed from the aortic arch to the top of the head during uneventful rapid bolus intravenous contrast administration. Coronal and sagittal reformations and 3D post processing was performed. CT dose reduction was achieved through use of a standardized protocol tailored for this examination and automatic exposure control for dose modulation. FINDINGS: CTA NECK There is conventional three vessel arch anatomy. The bilateral subclavian, common carotid, and internal carotid arteries are patent with no flow-limiting stenosis. % of right carotid artery stenosis: No significant stenosis % of left carotid artery stenosis: No significant stenosis. Measurements utilized NASCET criteria. NASCET method was utilized for calculating stenosis. The vertebral arteries are codominant and patent. The cervical soft tissues are unremarkable. There are degenerative changes of the cervical spine. CTA HEAD The vertebral arteries are codominant. The basilar artery and its branches are normal. The internal carotid, anterior cerebral, and middle cerebral arteries are patent. There is no flow-limiting intracranial stenosis. There is no aneurysm. There are no sizable posterior communicating arteries. No large vessel occlusion. No acute pathology in the head and neck. .    CT CODE NEURO HEAD WO CONTRAST    Result Date: 1/19/2023  INDICATION: stroke, lkw 8am, tremors, ams EXAM: CT HEAD WITHOUT CONTRAST. COMPARISON: None . PROCEDURE: Unenhanced head CT. Axial images were obtained from skull base to the vertex. Images were reformatted in the coronal and sagittal planes. Soft tissue and bone windows were examined. No contrast. CT dose reduction was achieved through use of a standardized protocol tailored for this examination and automatic exposure control for dose modulation. FINDINGS: Cerebral sulci and ventricular size are mildly increased, but upper normal with age. There are patchy diffuse mild to moderate periventricular white matter changes. While nonspecific these are likely due to small vessel ischemic change. There is no evidence of midline shift, extra-axial collection, mass lesion or mass effect. No evidence of acute bleed. No acute bony abnormality. No obvious acute ischemia. .     1. No acute intracranial abnormality. 2. Microvascular ischemic and other age-related changes. PROCEDURES   Unless otherwise noted below, none  Performed by: Patti Iyer MD   Intubation    Date/Time: 1/19/2023 6:42 PM  Performed by: Esthela Cordova MD  Authorized by: Esthela Cordova MD     Consent:     Consent obtained:  Emergent situation  Pre-procedure details:     Indication: predicted clinical deterioration      Patient status:  Unresponsive    Look externally: no concerns      Mouth opening - incisor distance:  3 or more finger widths    Obstruction: none      Neck mobility: normal      Pharmacologic strategy: RSI      Induction agents:  Etomidate    Paralytics:  Rocuronium  Procedure details:     Preoxygenation:  Nasal cannula    CPR in progress: no      Intubation method:  Oral    Intubation technique: video assisted      Laryngoscope blade:   Mac 3    Bougie used: no      Tube size (mm):  7.5    Tube type:  Cuffed    Number of attempts:  1    Ventilation between attempts: no      Tube visualized through cords: yes    Placement assessment:     ETT at teeth/gumline (cm):  25    Tube secured with:  ETT starks    Breath sounds:  Equal    Placement verification: colorimetric ETCO2    Post-procedure details:     Procedure completion:  Tolerated      CRITICAL CARE NOTE :    6:41 PM    IMPENDING DETERIORATION -Airway, CNS, and Metabolic  ASSOCIATED RISK FACTORS - Metabolic changes and CNS Decompensation  MANAGEMENT- Bedside Assessment, Supervision of Care, and Transfer  INTERPRETATION -  Xrays, CT Scan, Blood Gases, ECG, and Blood Pressure  INTERVENTIONS - vent mngmt and Metobolic interventions  CASE REVIEW - Hospitalist/Intensivist and Medical Sub-Specialist  TREATMENT RESPONSE -Stable  PERFORMED BY - Self    NOTES   :  I have spent 65 minutes of critical care time involved in lab review, consultations with specialist, family decision- making, bedside attention and documentation. This time excludes time spent in any separate billed procedures. During this entire length of time I was immediately available to the patient .     Shana Vo MD        EMERGENCY DEPARTMENT COURSE and DIFFERENTIAL DIAGNOSIS/MDM   Vitals:    Vitals:    01/19/23 1400 01/19/23 1415 01/19/23 1430 01/19/23 1445   BP: (!) 203/108 (!) 221/170 117/70 (!) 137/91   Pulse: (!) 114 (!) 118 86 82   Resp: 24 22 22 23   Temp:       SpO2: 97% 97% 96% 96%   Weight:       Height:            Patient was given the following medications:  Medications   LORazepam (ATIVAN) injection 2 mg (2 mg IntraVENous Given 1/19/23 1158)   iopamidoL (ISOVUE-370) 370 mg iodine /mL (76 %) injection 100 mL (100 mL IntraVENous Given 1/19/23 1234)   sodium chloride 0.9 % bolus infusion 1,000 mL (0 mL IntraVENous IV Completed 1/19/23 1422)   labetaloL (NORMODYNE;TRANDATE) 20 mg/4 mL (5 mg/mL) injection 10 mg (10 mg IntraVENous Given 1/19/23 1424)   sodium chloride 0.9 % bolus infusion 1,000 mL (1,000 mL IntraVENous New Bag 1/19/23 1425)   insulin regular (NOVOLIN R, HUMULIN R) injection 10 Units (10 Units IntraVENous Given 1/19/23 1500)   etomidate (AMIDATE) 2 mg/mL injection 20 mg (20 mg IntraVENous Given 1/19/23 1456)   rocuronium injection 100 mg (100 mg IntraVENous Given 1/19/23 1457)       CC/HPI Summary, DDx, ED Course, and Reassessment:   82-year-old female presents for evaluation of altered mental status. Stroke alert called on arrival based on EMS report, patient found to be seizing initially. She was treated with 2 mg of Ativan which resulted in cessation of convulsions. Loaded with 2 g of Keppra. We will continue with CT and CTA. After discussion with family, patient has a history of diabetes and hypertension. There is unknown if she is compliant with her medications. No history of seizures. Further concern for DKA versus HHS and will evaluate with CBC, CMP, blood gas, UA    ED Course as of 01/19/23 1837   Thu Jan 19, 2023   1259 CT CODE NEURO HEAD WO CONTRAST  IMPRESSION  1. No acute intracranial abnormality. 2. Microvascular ischemic and other age-related changes. [BQ]   1517 ECG performed at 1246 and interpreted by me shows sinus tachycardia with a ventricular rate of 109, normal intervals, no ST elevation or depression [BQ]      ED Course User Index  [BQ] Juve Peres MD       Lab work notable for severe hyperglycemia and acidosis but has an elevated bicarb. Mixed picture not clearly consistent with DKA. Will start insulin to decrease blood glucose as this is likely related to her seizure activity. CT scans with no evidence of bleed or large vessel occlusion. Discussed with Dr. Myrtle Constantino, ICU doctor at [facility] who will accept patient in transfer for EEG to determine if she is in nonconvulsive status. He recommends intubation so that further anticonvulsant medications can be given including a Versed drip. ED FINAL IMPRESSION     1. Seizure ([address]    2. Hyperglycemia    3. Non-convulsive status epilepticus [facility])          DISPOSITION/PLAN   Transferred to Another Facility    Transfer: The patient is being transferred to [facility] for EEG. The results of their tests and reasons for their transfer have been discussed with the patient and/or available family. The patient/family has conveyed agreement and understanding for the need to be admitted and for their admission diagnosis. Consultation has been made with Sabine Cordova, who agrees to accept the transfer. I am the Primary Clinician of Record. Alejandro Guzman MD (electronically signed)    (Please note that parts of this dictation were completed with voice recognition software. Quite often unanticipated grammatical, syntax, homophones, and other interpretive errors are inadvertently transcribed by the computer software. Please disregards these errors.  Please excuse any errors that have escaped final proofreading.)

## 2023-01-20 ENCOUNTER — APPOINTMENT (OUTPATIENT)
Dept: GENERAL RADIOLOGY | Age: 78
DRG: 637 | End: 2023-01-20
Attending: NURSE PRACTITIONER
Payer: MEDICARE

## 2023-01-20 LAB
ADMINISTERED INITIALS, ADMINIT: NORMAL
AMMONIA PLAS-SCNC: 24 UMOL/L
ANION GAP SERPL CALC-SCNC: 2 MMOL/L (ref 5–15)
ANION GAP SERPL CALC-SCNC: 5 MMOL/L (ref 5–15)
ANION GAP SERPL CALC-SCNC: 5 MMOL/L (ref 5–15)
ANION GAP SERPL CALC-SCNC: 6 MMOL/L (ref 5–15)
ANION GAP SERPL CALC-SCNC: 6 MMOL/L (ref 5–15)
ANION GAP SERPL CALC-SCNC: 7 MMOL/L (ref 5–15)
ATRIAL RATE: 109 BPM
ATRIAL RATE: 125 BPM
BUN SERPL-MCNC: 14 MG/DL (ref 6–20)
BUN SERPL-MCNC: 15 MG/DL (ref 6–20)
BUN SERPL-MCNC: 16 MG/DL (ref 6–20)
BUN SERPL-MCNC: 18 MG/DL (ref 6–20)
BUN SERPL-MCNC: 22 MG/DL (ref 6–20)
BUN SERPL-MCNC: 23 MG/DL (ref 6–20)
BUN/CREAT SERPL: 12 (ref 12–20)
BUN/CREAT SERPL: 14 (ref 12–20)
BUN/CREAT SERPL: 15 (ref 12–20)
BUN/CREAT SERPL: 15 (ref 12–20)
CALCIUM SERPL-MCNC: 8 MG/DL (ref 8.5–10.1)
CALCIUM SERPL-MCNC: 8.1 MG/DL (ref 8.5–10.1)
CALCIUM SERPL-MCNC: 8.3 MG/DL (ref 8.5–10.1)
CALCIUM SERPL-MCNC: 8.6 MG/DL (ref 8.5–10.1)
CALCIUM SERPL-MCNC: 8.7 MG/DL (ref 8.5–10.1)
CALCIUM SERPL-MCNC: 8.8 MG/DL (ref 8.5–10.1)
CALCULATED P AXIS, ECG09: 27 DEGREES
CALCULATED P AXIS, ECG09: 36 DEGREES
CALCULATED R AXIS, ECG10: 38 DEGREES
CALCULATED R AXIS, ECG10: 7 DEGREES
CALCULATED T AXIS, ECG11: 105 DEGREES
CALCULATED T AXIS, ECG11: 97 DEGREES
CHLORIDE SERPL-SCNC: 101 MMOL/L (ref 97–108)
CHLORIDE SERPL-SCNC: 102 MMOL/L (ref 97–108)
CHLORIDE SERPL-SCNC: 103 MMOL/L (ref 97–108)
CHLORIDE SERPL-SCNC: 103 MMOL/L (ref 97–108)
CHLORIDE SERPL-SCNC: 104 MMOL/L (ref 97–108)
CHLORIDE SERPL-SCNC: 106 MMOL/L (ref 97–108)
CO2 SERPL-SCNC: 27 MMOL/L (ref 21–32)
CO2 SERPL-SCNC: 28 MMOL/L (ref 21–32)
CO2 SERPL-SCNC: 31 MMOL/L (ref 21–32)
CREAT SERPL-MCNC: 1.21 MG/DL (ref 0.55–1.02)
CREAT SERPL-MCNC: 1.22 MG/DL (ref 0.55–1.02)
CREAT SERPL-MCNC: 1.26 MG/DL (ref 0.55–1.02)
CREAT SERPL-MCNC: 1.31 MG/DL (ref 0.55–1.02)
CREAT SERPL-MCNC: 1.45 MG/DL (ref 0.55–1.02)
CREAT SERPL-MCNC: 1.55 MG/DL (ref 0.55–1.02)
D50 ADMINISTERED, D50ADM: 0 ML
D50 ORDER, D50ORD: 0 ML
DIAGNOSIS, 93000: NORMAL
DIAGNOSIS, 93000: NORMAL
ERYTHROCYTE [DISTWIDTH] IN BLOOD BY AUTOMATED COUNT: 12.3 % (ref 11.5–14.5)
EST. AVERAGE GLUCOSE BLD GHB EST-MCNC: ABNORMAL MG/DL
EST. AVERAGE GLUCOSE BLD GHB EST-MCNC: ABNORMAL MG/DL
GLUCOSE BLD STRIP.AUTO-MCNC: 104 MG/DL (ref 65–117)
GLUCOSE BLD STRIP.AUTO-MCNC: 104 MG/DL (ref 65–117)
GLUCOSE BLD STRIP.AUTO-MCNC: 113 MG/DL (ref 65–117)
GLUCOSE BLD STRIP.AUTO-MCNC: 123 MG/DL (ref 65–117)
GLUCOSE BLD STRIP.AUTO-MCNC: 129 MG/DL (ref 65–117)
GLUCOSE BLD STRIP.AUTO-MCNC: 134 MG/DL (ref 65–117)
GLUCOSE BLD STRIP.AUTO-MCNC: 140 MG/DL (ref 65–117)
GLUCOSE BLD STRIP.AUTO-MCNC: 145 MG/DL (ref 65–117)
GLUCOSE BLD STRIP.AUTO-MCNC: 152 MG/DL (ref 65–117)
GLUCOSE BLD STRIP.AUTO-MCNC: 156 MG/DL (ref 65–117)
GLUCOSE BLD STRIP.AUTO-MCNC: 161 MG/DL (ref 65–117)
GLUCOSE BLD STRIP.AUTO-MCNC: 193 MG/DL (ref 65–117)
GLUCOSE BLD STRIP.AUTO-MCNC: 193 MG/DL (ref 65–117)
GLUCOSE BLD STRIP.AUTO-MCNC: 204 MG/DL (ref 65–117)
GLUCOSE BLD STRIP.AUTO-MCNC: 211 MG/DL (ref 65–117)
GLUCOSE BLD STRIP.AUTO-MCNC: 213 MG/DL (ref 65–117)
GLUCOSE BLD STRIP.AUTO-MCNC: 213 MG/DL (ref 65–117)
GLUCOSE BLD STRIP.AUTO-MCNC: 226 MG/DL (ref 65–117)
GLUCOSE BLD STRIP.AUTO-MCNC: 230 MG/DL (ref 65–117)
GLUCOSE BLD STRIP.AUTO-MCNC: 258 MG/DL (ref 65–117)
GLUCOSE BLD STRIP.AUTO-MCNC: 288 MG/DL (ref 65–117)
GLUCOSE BLD STRIP.AUTO-MCNC: 289 MG/DL (ref 65–117)
GLUCOSE SERPL-MCNC: 140 MG/DL (ref 65–100)
GLUCOSE SERPL-MCNC: 158 MG/DL (ref 65–100)
GLUCOSE SERPL-MCNC: 178 MG/DL (ref 65–100)
GLUCOSE SERPL-MCNC: 198 MG/DL (ref 65–100)
GLUCOSE SERPL-MCNC: 207 MG/DL (ref 65–100)
GLUCOSE SERPL-MCNC: 222 MG/DL (ref 65–100)
GLUCOSE, GLC: 104 MG/DL
GLUCOSE, GLC: 104 MG/DL
GLUCOSE, GLC: 113 MG/DL
GLUCOSE, GLC: 123 MG/DL
GLUCOSE, GLC: 129 MG/DL
GLUCOSE, GLC: 134 MG/DL
GLUCOSE, GLC: 140 MG/DL
GLUCOSE, GLC: 145 MG/DL
GLUCOSE, GLC: 152 MG/DL
GLUCOSE, GLC: 156 MG/DL
GLUCOSE, GLC: 161 MG/DL
GLUCOSE, GLC: 193 MG/DL
GLUCOSE, GLC: 193 MG/DL
GLUCOSE, GLC: 204 MG/DL
GLUCOSE, GLC: 211 MG/DL
GLUCOSE, GLC: 213 MG/DL
GLUCOSE, GLC: 213 MG/DL
GLUCOSE, GLC: 226 MG/DL
GLUCOSE, GLC: 230 MG/DL
GLUCOSE, GLC: 258 MG/DL
GLUCOSE, GLC: 288 MG/DL
GLUCOSE, GLC: 289 MG/DL
HBA1C MFR BLD: >14 % (ref 4–5.6)
HBA1C MFR BLD: >14 % (ref 4–5.6)
HCT VFR BLD AUTO: 29.3 % (ref 35–47)
HGB BLD-MCNC: 9.6 G/DL (ref 11.5–16)
HIGH TARGET, HITG: 200 MG/DL
INSULIN ADMINSTERED, INSADM: 0.4 UNITS/HOUR
INSULIN ADMINSTERED, INSADM: 0.8 UNITS/HOUR
INSULIN ADMINSTERED, INSADM: 1.1 UNITS/HOUR
INSULIN ADMINSTERED, INSADM: 1.2 UNITS/HOUR
INSULIN ADMINSTERED, INSADM: 1.2 UNITS/HOUR
INSULIN ADMINSTERED, INSADM: 1.7 UNITS/HOUR
INSULIN ADMINSTERED, INSADM: 1.7 UNITS/HOUR
INSULIN ADMINSTERED, INSADM: 1.8 UNITS/HOUR
INSULIN ADMINSTERED, INSADM: 1.8 UNITS/HOUR
INSULIN ADMINSTERED, INSADM: 10.3 UNITS/HOUR
INSULIN ADMINSTERED, INSADM: 11.5 UNITS/HOUR
INSULIN ADMINSTERED, INSADM: 2.6 UNITS/HOUR
INSULIN ADMINSTERED, INSADM: 2.8 UNITS/HOUR
INSULIN ADMINSTERED, INSADM: 3 UNITS/HOUR
INSULIN ADMINSTERED, INSADM: 4.3 UNITS/HOUR
INSULIN ADMINSTERED, INSADM: 4.6 UNITS/HOUR
INSULIN ADMINSTERED, INSADM: 5.1 UNITS/HOUR
INSULIN ADMINSTERED, INSADM: 6.4 UNITS/HOUR
INSULIN ADMINSTERED, INSADM: 6.5 UNITS/HOUR
INSULIN ADMINSTERED, INSADM: 7.3 UNITS/HOUR
INSULIN ADMINSTERED, INSADM: 8 UNITS/HOUR
INSULIN ADMINSTERED, INSADM: 8.7 UNITS/HOUR
INSULIN ORDER, INSORD: 0.4 UNITS/HOUR
INSULIN ORDER, INSORD: 0.8 UNITS/HOUR
INSULIN ORDER, INSORD: 1.1 UNITS/HOUR
INSULIN ORDER, INSORD: 1.2 UNITS/HOUR
INSULIN ORDER, INSORD: 1.2 UNITS/HOUR
INSULIN ORDER, INSORD: 1.7 UNITS/HOUR
INSULIN ORDER, INSORD: 1.7 UNITS/HOUR
INSULIN ORDER, INSORD: 1.8 UNITS/HOUR
INSULIN ORDER, INSORD: 1.8 UNITS/HOUR
INSULIN ORDER, INSORD: 10.3 UNITS/HOUR
INSULIN ORDER, INSORD: 11.5 UNITS/HOUR
INSULIN ORDER, INSORD: 2.6 UNITS/HOUR
INSULIN ORDER, INSORD: 2.8 UNITS/HOUR
INSULIN ORDER, INSORD: 3 UNITS/HOUR
INSULIN ORDER, INSORD: 4.3 UNITS/HOUR
INSULIN ORDER, INSORD: 4.6 UNITS/HOUR
INSULIN ORDER, INSORD: 5.1 UNITS/HOUR
INSULIN ORDER, INSORD: 6.4 UNITS/HOUR
INSULIN ORDER, INSORD: 6.5 UNITS/HOUR
INSULIN ORDER, INSORD: 7.3 UNITS/HOUR
INSULIN ORDER, INSORD: 8 UNITS/HOUR
INSULIN ORDER, INSORD: 8.7 UNITS/HOUR
LACTATE SERPL-SCNC: 1.2 MMOL/L (ref 0.4–2)
LOW TARGET, LOT: 150 MG/DL
MAGNESIUM SERPL-MCNC: 1.7 MG/DL (ref 1.6–2.4)
MAGNESIUM SERPL-MCNC: 1.7 MG/DL (ref 1.6–2.4)
MAGNESIUM SERPL-MCNC: 1.8 MG/DL (ref 1.6–2.4)
MAGNESIUM SERPL-MCNC: 1.9 MG/DL (ref 1.6–2.4)
MAGNESIUM SERPL-MCNC: 2.1 MG/DL (ref 1.6–2.4)
MAGNESIUM SERPL-MCNC: 2.4 MG/DL (ref 1.6–2.4)
MCH RBC QN AUTO: 28 PG (ref 26–34)
MCHC RBC AUTO-ENTMCNC: 32.8 G/DL (ref 30–36.5)
MCV RBC AUTO: 85.4 FL (ref 80–99)
MINUTES UNTIL NEXT BG, NBG: 60 MIN
MULTIPLIER, MUL: 0.01
MULTIPLIER, MUL: 0.01
MULTIPLIER, MUL: 0.02
MULTIPLIER, MUL: 0.03
MULTIPLIER, MUL: 0.04
MULTIPLIER, MUL: 0.05
MULTIPLIER, MUL: 0.06
MULTIPLIER, MUL: 0.07
NRBC # BLD: 0 K/UL (ref 0–0.01)
NRBC BLD-RTO: 0 PER 100 WBC
ORDER INITIALS, ORDINIT: NORMAL
P-R INTERVAL, ECG05: 148 MS
P-R INTERVAL, ECG05: 156 MS
PHOSPHATE SERPL-MCNC: 1.7 MG/DL (ref 2.6–4.7)
PLATELET # BLD AUTO: 245 K/UL (ref 150–400)
PMV BLD AUTO: 10.9 FL (ref 8.9–12.9)
POTASSIUM SERPL-SCNC: 3.3 MMOL/L (ref 3.5–5.1)
POTASSIUM SERPL-SCNC: 3.4 MMOL/L (ref 3.5–5.1)
POTASSIUM SERPL-SCNC: 4 MMOL/L (ref 3.5–5.1)
POTASSIUM SERPL-SCNC: 4.3 MMOL/L (ref 3.5–5.1)
Q-T INTERVAL, ECG07: 298 MS
Q-T INTERVAL, ECG07: 366 MS
QRS DURATION, ECG06: 82 MS
QRS DURATION, ECG06: 88 MS
QTC CALCULATION (BEZET), ECG08: 430 MS
QTC CALCULATION (BEZET), ECG08: 492 MS
RBC # BLD AUTO: 3.43 M/UL (ref 3.8–5.2)
SERVICE CMNT-IMP: ABNORMAL
SERVICE CMNT-IMP: NORMAL
SODIUM SERPL-SCNC: 134 MMOL/L (ref 136–145)
SODIUM SERPL-SCNC: 136 MMOL/L (ref 136–145)
SODIUM SERPL-SCNC: 137 MMOL/L (ref 136–145)
SODIUM SERPL-SCNC: 139 MMOL/L (ref 136–145)
T4 FREE SERPL-MCNC: 0.8 NG/DL (ref 0.8–1.5)
TSH SERPL DL<=0.05 MIU/L-ACNC: 4.57 UIU/ML (ref 0.36–3.74)
VENTRICULAR RATE, ECG03: 109 BPM
VENTRICULAR RATE, ECG03: 125 BPM
VIT B12 SERPL-MCNC: 812 PG/ML (ref 193–986)
WBC # BLD AUTO: 10.5 K/UL (ref 3.6–11)

## 2023-01-20 PROCEDURE — 82607 VITAMIN B-12: CPT

## 2023-01-20 PROCEDURE — 84443 ASSAY THYROID STIM HORMONE: CPT

## 2023-01-20 PROCEDURE — 99222 1ST HOSP IP/OBS MODERATE 55: CPT | Performed by: CLINICAL NURSE SPECIALIST

## 2023-01-20 PROCEDURE — 74011250636 HC RX REV CODE- 250/636: Performed by: ANESTHESIOLOGY

## 2023-01-20 PROCEDURE — 95816 EEG AWAKE AND DROWSY: CPT | Performed by: PSYCHIATRY & NEUROLOGY

## 2023-01-20 PROCEDURE — 84439 ASSAY OF FREE THYROXINE: CPT

## 2023-01-20 PROCEDURE — 74011250637 HC RX REV CODE- 250/637: Performed by: NURSE PRACTITIONER

## 2023-01-20 PROCEDURE — C1894 INTRO/SHEATH, NON-LASER: HCPCS

## 2023-01-20 PROCEDURE — 74011250636 HC RX REV CODE- 250/636: Performed by: NURSE PRACTITIONER

## 2023-01-20 PROCEDURE — 80048 BASIC METABOLIC PNL TOTAL CA: CPT

## 2023-01-20 PROCEDURE — 65610000006 HC RM INTENSIVE CARE

## 2023-01-20 PROCEDURE — 94003 VENT MGMT INPAT SUBQ DAY: CPT

## 2023-01-20 PROCEDURE — 83735 ASSAY OF MAGNESIUM: CPT

## 2023-01-20 PROCEDURE — 77030020365 HC SOL INJ SOD CL 0.9% 50ML

## 2023-01-20 PROCEDURE — 84100 ASSAY OF PHOSPHORUS: CPT

## 2023-01-20 PROCEDURE — C9254 INJECTION, LACOSAMIDE: HCPCS | Performed by: NURSE PRACTITIONER

## 2023-01-20 PROCEDURE — 74018 RADEX ABDOMEN 1 VIEW: CPT

## 2023-01-20 PROCEDURE — 87040 BLOOD CULTURE FOR BACTERIA: CPT

## 2023-01-20 PROCEDURE — 82140 ASSAY OF AMMONIA: CPT

## 2023-01-20 PROCEDURE — 74011000250 HC RX REV CODE- 250: Performed by: NURSE PRACTITIONER

## 2023-01-20 PROCEDURE — 83036 HEMOGLOBIN GLYCOSYLATED A1C: CPT

## 2023-01-20 PROCEDURE — 76937 US GUIDE VASCULAR ACCESS: CPT

## 2023-01-20 PROCEDURE — 74011000258 HC RX REV CODE- 258: Performed by: NURSE PRACTITIONER

## 2023-01-20 PROCEDURE — 36415 COLL VENOUS BLD VENIPUNCTURE: CPT

## 2023-01-20 PROCEDURE — 83605 ASSAY OF LACTIC ACID: CPT

## 2023-01-20 PROCEDURE — 82962 GLUCOSE BLOOD TEST: CPT

## 2023-01-20 PROCEDURE — 51798 US URINE CAPACITY MEASURE: CPT

## 2023-01-20 PROCEDURE — 74011636637 HC RX REV CODE- 636/637: Performed by: NURSE PRACTITIONER

## 2023-01-20 PROCEDURE — C1751 CATH, INF, PER/CENT/MIDLINE: HCPCS

## 2023-01-20 PROCEDURE — 95714 VEEG EA 12-26 HR UNMNTR: CPT | Performed by: NURSE PRACTITIONER

## 2023-01-20 PROCEDURE — 99291 CRITICAL CARE FIRST HOUR: CPT | Performed by: PSYCHIATRY & NEUROLOGY

## 2023-01-20 PROCEDURE — 85027 COMPLETE CBC AUTOMATED: CPT

## 2023-01-20 PROCEDURE — 94002 VENT MGMT INPAT INIT DAY: CPT

## 2023-01-20 RX ORDER — CARVEDILOL 12.5 MG/1
12.5 TABLET ORAL
COMMUNITY

## 2023-01-20 RX ORDER — DEXMEDETOMIDINE HYDROCHLORIDE 4 UG/ML
.1-1.5 INJECTION, SOLUTION INTRAVENOUS
Status: DISCONTINUED | OUTPATIENT
Start: 2023-01-20 | End: 2023-01-22

## 2023-01-20 RX ORDER — INSULIN GLARGINE 100 [IU]/ML
0.4 INJECTION, SOLUTION SUBCUTANEOUS
Status: DISCONTINUED | OUTPATIENT
Start: 2023-01-21 | End: 2023-01-21

## 2023-01-20 RX ORDER — LACOSAMIDE 10 MG/ML
100 INJECTION, SOLUTION INTRAVENOUS ONCE
Status: COMPLETED | OUTPATIENT
Start: 2023-01-20 | End: 2023-01-20

## 2023-01-20 RX ORDER — LOSARTAN POTASSIUM 100 MG/1
100 TABLET ORAL DAILY
Status: ON HOLD | COMMUNITY
End: 2023-01-26 | Stop reason: SDUPTHER

## 2023-01-20 RX ORDER — METFORMIN HYDROCHLORIDE 850 MG/1
850 TABLET ORAL 2 TIMES DAILY WITH MEALS
COMMUNITY

## 2023-01-20 RX ORDER — FUROSEMIDE 40 MG/1
40 TABLET ORAL DAILY
COMMUNITY

## 2023-01-20 RX ORDER — MAGNESIUM SULFATE 1 G/100ML
1 INJECTION INTRAVENOUS ONCE
Status: COMPLETED | OUTPATIENT
Start: 2023-01-20 | End: 2023-01-20

## 2023-01-20 RX ORDER — INSULIN LISPRO 100 [IU]/ML
INJECTION, SOLUTION INTRAVENOUS; SUBCUTANEOUS EVERY 6 HOURS
Status: DISCONTINUED | OUTPATIENT
Start: 2023-01-21 | End: 2023-01-21

## 2023-01-20 RX ORDER — ATORVASTATIN CALCIUM 40 MG/1
40 TABLET, FILM COATED ORAL DAILY
COMMUNITY

## 2023-01-20 RX ORDER — MAGNESIUM SULFATE HEPTAHYDRATE 40 MG/ML
2 INJECTION, SOLUTION INTRAVENOUS ONCE
Status: COMPLETED | OUTPATIENT
Start: 2023-01-21 | End: 2023-01-21

## 2023-01-20 RX ORDER — POTASSIUM CHLORIDE 7.45 MG/ML
10 INJECTION INTRAVENOUS
Status: COMPLETED | OUTPATIENT
Start: 2023-01-20 | End: 2023-01-20

## 2023-01-20 RX ORDER — IBUPROFEN 200 MG
4 TABLET ORAL AS NEEDED
Status: DISCONTINUED | OUTPATIENT
Start: 2023-01-20 | End: 2023-01-26 | Stop reason: HOSPADM

## 2023-01-20 RX ORDER — LEVETIRACETAM 500 MG/5ML
750 INJECTION, SOLUTION, CONCENTRATE INTRAVENOUS EVERY 12 HOURS
Status: DISCONTINUED | OUTPATIENT
Start: 2023-01-20 | End: 2023-01-23

## 2023-01-20 RX ORDER — LEVOTHYROXINE SODIUM 100 UG/1
125 TABLET ORAL
COMMUNITY

## 2023-01-20 RX ORDER — ACETAMINOPHEN 325 MG/1
500 TABLET ORAL
COMMUNITY

## 2023-01-20 RX ADMIN — LACOSAMIDE 100 MG: 10 INJECTION INTRAVENOUS at 21:29

## 2023-01-20 RX ADMIN — SODIUM CHLORIDE, POTASSIUM CHLORIDE, SODIUM LACTATE AND CALCIUM CHLORIDE 1000 ML: 600; 310; 30; 20 INJECTION, SOLUTION INTRAVENOUS at 15:19

## 2023-01-20 RX ADMIN — HEPARIN SODIUM 5000 UNITS: 5000 INJECTION INTRAVENOUS; SUBCUTANEOUS at 08:34

## 2023-01-20 RX ADMIN — SODIUM CHLORIDE 4.3 UNITS/HR: 9 INJECTION, SOLUTION INTRAVENOUS at 18:01

## 2023-01-20 RX ADMIN — HEPARIN SODIUM 5000 UNITS: 5000 INJECTION INTRAVENOUS; SUBCUTANEOUS at 16:57

## 2023-01-20 RX ADMIN — DEXTROSE AND SODIUM CHLORIDE 150 ML/HR: 5; 450 INJECTION, SOLUTION INTRAVENOUS at 06:06

## 2023-01-20 RX ADMIN — PROPOFOL 35 MCG/KG/MIN: 10 INJECTION, EMULSION INTRAVENOUS at 05:25

## 2023-01-20 RX ADMIN — SODIUM CHLORIDE, POTASSIUM CHLORIDE, SODIUM LACTATE AND CALCIUM CHLORIDE 500 ML: 600; 310; 30; 20 INJECTION, SOLUTION INTRAVENOUS at 04:24

## 2023-01-20 RX ADMIN — NYSTATIN OINTMENT: 100000 OINTMENT TOPICAL at 18:30

## 2023-01-20 RX ADMIN — CHLORHEXIDINE GLUCONATE 15 ML: 1.2 RINSE ORAL at 08:39

## 2023-01-20 RX ADMIN — LEVETIRACETAM 750 MG: 100 INJECTION, SOLUTION, CONCENTRATE INTRAVENOUS at 11:57

## 2023-01-20 RX ADMIN — SODIUM CHLORIDE 11.5 UNITS/HR: 9 INJECTION, SOLUTION INTRAVENOUS at 21:12

## 2023-01-20 RX ADMIN — POTASSIUM CHLORIDE 10 MEQ: 14.9 INJECTION, SOLUTION INTRAVENOUS at 01:58

## 2023-01-20 RX ADMIN — NYSTATIN OINTMENT: 100000 OINTMENT TOPICAL at 08:29

## 2023-01-20 RX ADMIN — POTASSIUM PHOSPHATE, MONOBASIC AND POTASSIUM PHOSPHATE, DIBASIC: 224; 236 INJECTION, SOLUTION, CONCENTRATE INTRAVENOUS at 02:56

## 2023-01-20 RX ADMIN — FAMOTIDINE 20 MG: 20 TABLET, FILM COATED ORAL at 11:57

## 2023-01-20 RX ADMIN — CHLORHEXIDINE GLUCONATE 15 ML: 1.2 RINSE ORAL at 20:08

## 2023-01-20 RX ADMIN — POTASSIUM CHLORIDE 10 MEQ: 14.9 INJECTION, SOLUTION INTRAVENOUS at 00:56

## 2023-01-20 RX ADMIN — MAGNESIUM SULFATE HEPTAHYDRATE 2 G: 40 INJECTION, SOLUTION INTRAVENOUS at 23:41

## 2023-01-20 RX ADMIN — LEVETIRACETAM 750 MG: 100 INJECTION, SOLUTION, CONCENTRATE INTRAVENOUS at 23:15

## 2023-01-20 RX ADMIN — HEPARIN SODIUM 5000 UNITS: 5000 INJECTION INTRAVENOUS; SUBCUTANEOUS at 00:56

## 2023-01-20 RX ADMIN — DEXMEDETOMIDINE HYDROCHLORIDE 0.4 MCG/KG/HR: 4 INJECTION, SOLUTION INTRAVENOUS at 18:26

## 2023-01-20 RX ADMIN — SODIUM CHLORIDE, PRESERVATIVE FREE 10 ML: 5 INJECTION INTRAVENOUS at 13:14

## 2023-01-20 RX ADMIN — SODIUM CHLORIDE, PRESERVATIVE FREE 10 ML: 5 INJECTION INTRAVENOUS at 06:02

## 2023-01-20 RX ADMIN — MAGNESIUM SULFATE IN DEXTROSE 1 G: 10 INJECTION, SOLUTION INTRAVENOUS at 04:31

## 2023-01-20 RX ADMIN — POTASSIUM CHLORIDE 10 MEQ: 7.46 INJECTION, SOLUTION INTRAVENOUS at 03:03

## 2023-01-20 RX ADMIN — DEXTROSE AND SODIUM CHLORIDE 150 ML/HR: 5; 450 INJECTION, SOLUTION INTRAVENOUS at 13:19

## 2023-01-20 RX ADMIN — SODIUM CHLORIDE, POTASSIUM CHLORIDE, SODIUM LACTATE AND CALCIUM CHLORIDE 1000 ML: 600; 310; 30; 20 INJECTION, SOLUTION INTRAVENOUS at 08:25

## 2023-01-20 RX ADMIN — PROPOFOL 35 MCG/KG/MIN: 10 INJECTION, EMULSION INTRAVENOUS at 10:15

## 2023-01-20 RX ADMIN — DEXTROSE AND SODIUM CHLORIDE 150 ML/HR: 5; 450 INJECTION, SOLUTION INTRAVENOUS at 20:05

## 2023-01-20 RX ADMIN — POTASSIUM BICARBONATE 20 MEQ: 782 TABLET, EFFERVESCENT ORAL at 23:42

## 2023-01-20 RX ADMIN — SODIUM CHLORIDE 6.4 UNITS/HR: 9 INJECTION, SOLUTION INTRAVENOUS at 06:58

## 2023-01-20 RX ADMIN — POTASSIUM CHLORIDE 10 MEQ: 7.46 INJECTION, SOLUTION INTRAVENOUS at 04:34

## 2023-01-20 RX ADMIN — SODIUM CHLORIDE, PRESERVATIVE FREE 10 ML: 5 INJECTION INTRAVENOUS at 21:00

## 2023-01-20 NOTE — PROGRESS NOTES
Spiritual Care Assessment/Progress Note  [facility]      NAME: Eduardo Johnson      MRN: [id number]  AGE: 78 y.o. SEX: female  Restorationist Affiliation: No preference   Language: English     1/20/2023     Total Time (in minutes): 25     Spiritual Assessment begun in [address] [facility] through conversation with:         []Patient        [x] Family    [] Friend(s)        Reason for Consult: Request by family/friend(s)     Spiritual beliefs: (Please include comment if needed)     [x] Identifies with a selene tradition: Addi Luevano        [x] Supported by a selene community: Restorationism           [] Claims no spiritual orientation:           [] Seeking spiritual identity:                [] Adheres to an individual form of spirituality:           [] Not able to assess:                           Identified resources for coping:      [x] Prayer                               [] Music                  [] Guided Imagery     [x] Family/friends                 [] Pet visits     [] Devotional reading                         [] Unknown     [] Other:                                               Interventions offered during this visit: (See comments for more details)    Patient Interventions: Prayer (actual)     Family/Friend(s):  Affirmation of selene     Plan of Care:     [] Support spiritual and/or cultural needs    [] Support AMD and/or advance care planning process      [] Support grieving process   [] Coordinate Rites and/or Rituals    [] Coordination with community clergy   [] No spiritual needs identified at this time   [] Detailed Plan of Care below (See Comments)  [] Make referral to Music Therapy  [] Make referral to Pet Therapy     [] Make referral to Addiction services  [] Make referral to [facility]  [] Make referral to Spiritual Care Partner  [] No future visits requested        [x] Follow up visits as needed     Visited in response to a request by patient's family to offer prayer for patient who is on vent support. I spoke with her nurse, Mukund Lockwood who spoke to the events leading to the patient being in our ICU. The patient's son called an requested a  pray for his mother at bedside. I spoke with the vented patient and offered a prayer for her. I will contact her son later in the day and offer support to him.    Elvan Schwab, Chaplain, [place], [place], [facility]

## 2023-01-20 NOTE — PROGRESS NOTES
1930:Bedside and Verbal shift change report given to [address] (oncoming nurse) by Kavin Washburn (offgoing nurse). Report included the following information SBAR, Kardex, ED Summary, Procedure Summary, Intake/Output, MAR, Recent Results, Cardiac Rhythm NSR, Alarm Parameters , and Dual Neuro Assessment. 2130: Son Vladimir Moser updated      0430: Pt having tremours in upper extremities. Neuro NP Rachael Castanon notified- see note for further details    0600: Pauldingshmuel Hernández came to bedside     0730: Bedside and Verbal shift change report given to [address] (oncoming nurse) by [address] (offgoing nurse). Report included the following information SBAR, Kardex, ED Summary, Procedure Summary, Intake/Output, MAR, Recent Results, Cardiac Rhythm NSR, Alarm Parameters , and Dual Neuro Assessment.

## 2023-01-20 NOTE — PROGRESS NOTES
Reason for Admission:  New onset Seizures                      RUR Score:   12%                  Plan for utilizing home health:    TBD      PCP: First and Last name:  Dr Chandler Garnett     Name of Practice: [facility]   Are you a current patient: Yes/No: Yes   Approximate date of last visit: 12/12/22   Can you participate in a virtual visit with your PCP: No                    Current Advanced Directive/Advance Care Plan: Full Code      Healthcare Decision Maker:   Click here to complete [address] including selection of the Healthcare Decision Maker Relationship (ie \"Primary\")             Primary Decision Maker: Gretta Mckeon - Son - [phone]                  Transition of Care Plan:       Patient presented to the ED at [facility] after being found unresponsive at home with tremors and a right gaze. Patient intubated prior to transfer. Care manager met with patient's son Victor M Rodas [phone] to introduce self and explain role. Patient lives in a single story home with [address], she lives with 2 sons and a nephew. Per son patient was independent with cooking, cleaning and ADL's. She does not drive, family drives her to appointments. Patient has no previous DME,HH or IPR needs. Per son patient is a Misael Byrd patient, CM left a VM message for [employer]. CM confirmed demographic information. Will follow for transitions of care. Jamil Stacy RN,Care Management  Care Management Interventions  PCP Verified by CM:  ([facility])  Mode of Transport at Discharge: BLS  MyChart Signup: No  Discharge Durable Medical Equipment: No  Physical Therapy Consult: No  Occupational Therapy Consult: No  Speech Therapy Consult: No  Support Systems: Child(bismark) [facility] Victor M Rodas [phone])           12:20 pm Received call back from [address] [place] [address] Avenue: [phone] and patient is seen by Dr Chandler Garnett last seen on 12/12/22 upcoming appointment is for 2/7/23.  Patient is seen at the [facility] office in [facility] [phone].    Jaja Maldonado RN,Care Management

## 2023-01-20 NOTE — CONSULTS
NEUROLOGY CONSULT  Julianna Shin NP        Date Time: 01/20/23 1:44 AM  Patient [address]  Attending Physician: Elizabeth Dubon MD    REASON FOR CONSULTATION:   New Onset Seizure     History of Present Illness:   Mark Hutchison is a 78 y.o. F with presumed pmh of diabetes who [facility] EMS after patient's family found her on the floor with tremulous movements associated with right gaze deviation, and unresponsive. Upon arrival to the ED, patient was noted to be seizing thus was given 2mg of Ativan and loaded with 2g Keppra with maintenance dose of 500mg BID. CTH showed no acute intracranial abnormalities. CTA H/N showed no large vessel occlusion. No acute pathology in the head and neck. Patient was noted to have hyponatremia of 114 and blood glucose level >1,200. Patient also noted with elevated lactic acid, elevated liver enzymes, and elevated renal function. The patient was intubated for airway protection and transferred to [facility] ICU via [facility]. Neurology was consulted upon arrival to [facility] ICU. Rapid EEG was obtained upon arrival to ICU. Around 2300, patient was noted with possible seizure activities. Spo2 dropped to 70's with hypotension, Left gaze deviation, and loss of bowel control. Patient was loaded with Vimpat 200mg with no maintenance dose given elevated liver function. Around 04:30 am, primary nurse reported patient having tremulous movements in BUE which was witnessed by neuro critical care NP. Propofol was increased from 25mcg to 35 mcg which seems to thai seizure activities. Keppra was increased from 500 to 750mg bid given poor renal function. Past Medical History:   No past medical history on file.       No Known Allergies    Social & Family History:     Social History     Socioeconomic History    Marital status:      Spouse name: Not on file    Number of children: Not on file    Years of education: Not on file    Highest education level: Not on file   Occupational History    Not on file   Tobacco Use    Smoking status: Not on file    Smokeless tobacco: Not on file   Substance and Sexual Activity    Alcohol use: Not on file    Drug use: Not on file    Sexual activity: Not on file   Other Topics Concern    Not on file   Social History Narrative    Not on file     Social Determinants of Health     Financial Resource Strain: Not on file   Food Insecurity: Not on file   Transportation Needs: Not on file   Physical Activity: Not on file   Stress: Not on file   Social Connections: Not on file   Intimate Partner Violence: Not on file   Housing Stability: Not on file     No family history on file.       Meds:     Current Facility-Administered Medications   Medication Dose Route Frequency    sodium chloride (NS) flush 5-40 mL  5-40 mL IntraVENous Q8H    sodium chloride (NS) flush 5-40 mL  5-40 mL IntraVENous PRN    acetaminophen (TYLENOL) tablet 650 mg  650 mg Oral Q6H PRN    Or    acetaminophen (TYLENOL) suppository 650 mg  650 mg Rectal Q6H PRN    polyethylene glycol (MIRALAX) packet 17 g  17 g Oral DAILY PRN    ondansetron (ZOFRAN ODT) tablet 4 mg  4 mg Oral Q8H PRN    Or    ondansetron (ZOFRAN) injection 4 mg  4 mg IntraVENous Q6H PRN    propofol (DIPRIVAN) 10 mg/mL infusion  0-50 mcg/kg/min IntraVENous TITRATE    glucose chewable tablet 16 g  4 Tablet Oral PRN    glucagon (GLUCAGEN) injection 1 mg  1 mg IntraMUSCular PRN    insulin regular (NOVOLIN R, HUMULIN R) 100 Units in 0.9% sodium chloride 100 mL infusion  0-50 Units/hr IntraVENous TITRATE    nystatin (MYCOSTATIN) 100,000 unit/gram ointment   Topical BID    levETIRAcetam (KEPPRA) injection 500 mg  500 mg IntraVENous Q12H    heparin (porcine) injection 5,000 Units  5,000 Units SubCUTAneous Q8H    famotidine (PEPCID) tablet 20 mg  20 mg Oral DAILY    chlorhexidine (ORAL CARE KIT) 0.12 % mouthwash 15 mL  15 mL Oral Q12H    potassium chloride 10 mEq in 50 ml IVPB  10 mEq IntraVENous Q1H dextrose 5 % - 0.45% NaCl infusion  150 mL/hr IntraVENous CONTINUOUS     I personally reviewed all of the medications    Review of Systems:   Review of systems not obtained due to patient factors. Unresponsive on ventilator and sedated therefore too cognitively or communication impaired to participate in ROS. Physical Exam:   Blood pressure (!) 91/55, pulse 66, temperature 98.5 °F (36.9 °C), resp. rate 18, SpO2 100 %. GEN: Well developed and nourished patient in NAD  HEENT: Normocephalic. Non-icter, no congestion  Lungs: diminished bilaterally Ant; intubated on vent  Cardiac: S1,S2, normal rate and rhythm, with no murmurs. no carotid bruits, no gallops  Abdomen: Normal bowel sounds, no distention, soft, non-tender  Extremities: 2+ Radial pulses, no clubbing, cyanosis, or edema  Skin: no rashes or lesions noted      NEURO:  Mental status: unresponsive, intubated on vent and sedated with 35 mcg of propofol  Cranial Nerves: ET-tube inplace~No Speech accessed, PERRL, R eye with Left gaze deviation, no facial asymmetry noted around ET-tube. weak gag/cough, sluggish pupils and corneal  Motor:  Normal bulk and tone, no spontaneous movement noted. No withdraws to RUE, withdraws x 3 extremities to deep pain; Noted involuntary movements which responded well to propofol.   Reflexes: +2 throughout, up-going toes bilaterally   Sensation: Intact x 3 extremities to pain; no withdrawal to RUE  Gait:  Deferred     Labs/images:     Lab Results   Component Value Date/Time    WBC 9.2 01/19/2023 05:18 PM    HGB 11.0 (L) 01/19/2023 05:18 PM    HCT 33.7 (L) 01/19/2023 05:18 PM    PLATELET 194 75/33/8093 05:18 PM    MCV 84.9 01/19/2023 05:18 PM      Lab Results   Component Value Date/Time    Sodium 139 01/20/2023 12:05 AM    Potassium 3.4 (L) 01/20/2023 12:05 AM    Chloride 106 01/20/2023 12:05 AM    CO2 27 01/20/2023 12:05 AM    Anion gap 6 01/20/2023 12:05 AM    Glucose 140 (H) 01/20/2023 12:05 AM    BUN 23 (H) 01/20/2023 12:05 AM    Creatinine 1.55 (H) 01/20/2023 12:05 AM    BUN/Creatinine ratio 15 01/20/2023 12:05 AM    Calcium 8.3 (L) 01/20/2023 12:05 AM    Bilirubin, total 0.5 01/19/2023 05:18 PM    Alk. phosphatase 480 (H) 01/19/2023 05:18 PM    Protein, total 6.7 01/19/2023 05:18 PM    Albumin 3.3 (L) 01/19/2023 05:18 PM    Globulin 3.4 01/19/2023 05:18 PM    A-G Ratio 1.0 (L) 01/19/2023 05:18 PM    ALT (SGPT) 298 (H) 01/19/2023 05:18 PM    AST (SGOT) 586 (H) 01/19/2023 05:18 PM       CT Results (most recent):  Results from Hospital Encounter encounter on 01/19/23    CT CODE NEURO HEAD WO CONTRAST    Narrative  INDICATION: stroke, lkw 8am, tremors, ams    EXAM: CT HEAD WITHOUT CONTRAST. COMPARISON: None . PROCEDURE: Unenhanced head CT. Axial images were obtained from skull base to the  vertex. Images were reformatted in the coronal and sagittal planes. Soft tissue  and bone windows were examined. No contrast. CT dose reduction was achieved  through use of a standardized protocol tailored for this examination and  automatic exposure control for dose modulation. FINDINGS: Cerebral sulci and ventricular size are mildly increased, but upper  normal with age. There are patchy diffuse mild to moderate periventricular white  matter changes. While nonspecific these are likely due to small vessel ischemic  change. There is no evidence of midline shift, extra-axial collection, mass  lesion or mass effect. No evidence of acute bleed. No acute bony abnormality. No  obvious acute ischemia. .    Impression  1. No acute intracranial abnormality. 2. Microvascular ischemic and other age-related changes. Chart reviewed    Assessment:   Active Problems:    Seizures ([address] (1/19/2023)      Plan:    Will obtain cvEEG to ensure no subclinical status epilepticus  Increased keppra to 750 mg bid  Will add another AED if cvEEG showed any seizure  Continue with current dose of propofol for now and adjust as needed  Will obtain MRI Brain once stable  PRN Ativan/Versed/Valium for seizure activity lasting longer than 5 minutes  Avoid fluoroquinolones and 4th generation cephalosporins as can lower seizure threshold  Seizure precautions  Q1hr neuro checks  Treat any metabolic and infectious derangements   Obtain TSH, ammonia, T-pallidum, mag, phos, and B12       Case discussed with: primary nurse and intensivist        >50% time spent in counseling or coordination of care of the above in the assessment and plan     Signed by: Saurabh Urias NP    Neurology staff:  I examined the patient at the bedside. I discussed the case and agree with the plans and documentation from NP Kendal Jeffrey 95. Ms. Nivia Sandhoff is a 79-year-old woman who is a new patient for me. She was found down for unknown period of time with a gaze preference and decreased responsiveness. She had body tremulousness. She was brought to the hospital.  She was treated for possible status with 2 g of Keppra and Ativan. Her head CT and CTA were completely benign. Her electrolytes however showed severe hyponatremia at 114 and a blood glucose of over 1200. Also elevated lactic acid and LFTs. Later during her presentation she had recurrent seizure-like symptoms and she was loaded with Vimpat. She is now in ICU intubated with sedation. On exam, she grimaces to pain and stimulation. She does not follow any commands for me. She has a midline gaze with symmetric reactive pupils. When I attempt to do doll's eyes she resists slightly. Minimal withdrawal throughout. No abnormal movements. Gait deferred. 79-year-old woman who presented after being found down possibly seizing. Rapid EEG did not show any seizure burden. Formal EEG was also completed awaiting review. This all may have been provoked in the setting of notable metabolic abnormalities to include hyponatremia and hyperglycemia. Stay on [address]. MRI brain should be done with and without contrast.  Wean and extubate if possible.   Will continue to follow. 30 minutes of critical care time provided by me to include personal review of the documentation, review of rapid EEG, bedside time, discussion with other providers in the ICU. Rm Apodaca DO  Neurologist  Diplomate, American Board of Psychiatry and Neurology  Board Certified, Adult Neurology and Brain Injury Medicine      Please note that this dictation was completed with [email or web address], the computer voice recognition software. Quite often unanticipated grammatical, syntax, homophones, and other interpretive errors are inadvertently transcribed by the computer software. Please disregard these errors. Please excuse any errors that have escaped final proofreading.

## 2023-01-20 NOTE — PROGRESS NOTES
Follow up visit in support of patent's son, Bella Isaac and a family friend with him. The patient remains on vent support. Bella Isaac was in the ICU waiting room anticipating an update. Listened as he spoke of his selene in God that his mother will survive and recover.     Chaplain Natalia, MDiv, MS, [facility]

## 2023-01-20 NOTE — PROGRESS NOTES
Problem: Pressure Injury - Risk of  Goal: *Prevention of pressure injury  Description: Document Calvin Scale and appropriate interventions in the flowsheet. Outcome: Progressing Towards Goal  Note: Pressure Injury Interventions:  Sensory Interventions: Assess changes in LOC, Assess need for specialty bed, Avoid rigorous massage over bony prominences, Check visual cues for pain, Float heels, Keep linens dry and wrinkle-free, Minimize linen layers, Monitor skin under medical devices, Pressure redistribution bed/mattress (bed type), Turn and reposition approx. every two hours (pillows and wedges if needed)         Activity Interventions: Assess need for specialty bed, Pressure redistribution bed/mattress(bed type)    Mobility Interventions: Assess need for specialty bed, Float heels, HOB 30 degrees or less, Pressure redistribution bed/mattress (bed type), Turn and reposition approx. every two hours(pillow and wedges)    Nutrition Interventions: Document food/fluid/supplement intake, Discuss nutritional consult with provider    Friction and Shear Interventions: Apply protective barrier, creams and emollients, Foam dressings/transparent film/skin sealants, HOB 30 degrees or less, Lift sheet, Lift team/patient mobility team, Minimize layers                Problem: Patient Education: Go to Patient Education Activity  Goal: Patient/Family Education  Outcome: Progressing Towards Goal     Problem: Falls - Risk of  Goal: *Absence of Falls  Description: Document Fercho Fall Risk and appropriate interventions in the flowsheet.   Outcome: Progressing Towards Goal  Note: Fall Risk Interventions:                                Problem: Patient Education: Go to Patient Education Activity  Goal: Patient/Family Education  Outcome: Progressing Towards Goal

## 2023-01-20 NOTE — PROGRESS NOTES
Comprehensive Nutrition Assessment    Type and Reason for Visit: Initial, Consult    Nutrition Recommendations/Plan:     -Start EN support: Vital HP @ 10 ml/hr with 50 ml water flush q 4 hr and 2 packets Prosource bid    -Once off D5 increase tube feeding to 35 ml/hr with 50 ml water flush q 4 hr and 1 packet Prosource bid    -100 mg B1 x 7 days d/t refeeding risk         Malnutrition Assessment:  Malnutrition Status: At risk for malnutrition (specify) (01/20/23 1331)    Context:  Acute illness       Nutrition Assessment:    Pt admitted with Seizures. PMHx: HTN, DM 2. Found on floor at home. New onset seizures; intubated for airway protection. Neurology consulted-continuous EEG today. HHS; Diabetes Health following. Currently on insulin drip. Spoke with family @ bedside. Per son's pt has lost weight recently; hasn't been eating much for the past month (?early satiety). Unsure of pt's UBW but see some weight loss in her face and arms.  reports pt won't take her diabetes medicine. Possible recent weight loss related to uncontrolled DM. No weight history in chart so unable to determine amount of recent weight loss; also UTD actual PO intake PTA. Potassium, phosphorus and magnesium replaced overnight. Possible refeeding risk? CTM lytes and replace PRN. AG closed. Check A1c. Pt will remain on D5 IVF until off the insulin drip (610 calories/180 gm CHO). This and propofol (430 calories per day) combined meet almost 100% of pt's estimated energy needs (hypocaloric). Consult received to start EN support. Until off D5 recommend the following: Vital HP @ 10 ml/hr with 2 packets Prosource bid and 50 ml water flush q 4 hr to provide 220 ml, 460 calories, 79 gm protein and 720 ml free water (tube feeding/flush) per day to meet 76% protein needs.     Goal tube feeding on propofol @ current rate: Vital HP @ 35 ml/hr with 2 packets Prosource daily and 50 ml water flush q 4 hr to provide 770 ml, 890 calories, 96 gm protein and 1060 ml free water (tube feeding/flush) per day to meet estimated protein needs. Tube feeding and propofol combined will provide a total of 1320 calories per day. Nutritionally Significant Medications:  Insulin drip, Propofol @ 16.3 ml/hr, Magnesium sulfate, KCL, K Phos, Pepcid, D5 1/2 NS @ 150 ml/hr, Miralax PRN      Estimated Daily Nutrient Needs:  Energy Requirements Based On: Kcal/kg  Weight Used for Energy Requirements: Current (90.7)  Energy (kcal/day): 1300 (14 kcal/kg)-hypocaloric 2/2 obesity  Weight Used for Protein Requirements: Ideal  Protein (g/day): 104  Method Used for Fluid Requirements: 1 ml/kcal  Fluid (ml/day):      Nutrition Related Findings:   Edema: None  Last BM: 01/19/23, Formed, Soft    Wounds: None      Current Nutrition Therapies:  Diet: NPO  Nutrition Support: None      Anthropometric Measures:  Height: 5' 3\" (160 cm)  Ideal Body Weight (IBW): 115 lbs (52 kg)     Current Body Wt:  90.9 kg (200 lb 6.4 oz), 174.3 % IBW. Bed scale  Current BMI (kg/m2): 35.5                          BMI Category: Obese class 2 (BMI 35.0-39. 9)    Wt Readings from Last 10 Encounters:   01/20/23 90.9 kg (200 lb 6.4 oz)   01/19/23 90.7 kg (200 lb)           Nutrition Diagnosis:   Inadequate oral intake related to cognitive or neurological impairment, impaired respiratory function as evidenced by NPO or clear liquid status due to medical condition, intubation.     Nutrition Interventions:   Food and/or Nutrient Delivery: Start tube feeding  Nutrition Education/Counseling: No recommendations at this time  Coordination of Nutrition Care: Continue to monitor while inpatient, Interdisciplinary rounds       Goals:     Goals:  (EN to meet at least 80% estimated protein needs x 5-7 days.)       Nutrition Monitoring and Evaluation:   Behavioral-Environmental Outcomes: None identified  Food/Nutrient Intake Outcomes: Enteral nutrition intake/tolerance  Physical Signs/Symptoms Outcomes: Biochemical data, Weight, GI status    Discharge Planning:     Too soon to determine    Mary Jo Robison RD CNSC  Available via [address] [place] [address]

## 2023-01-20 NOTE — PROGRESS NOTES
Midline Insertion and Progress Note--Midline is a Dual Lumen, still not suitable for irritant medications, including Vancomycin and IV Potassium. PRE-PROCEDURE VERIFICATION  Correct Procedure: yes  Correct Site:  yes  Temperature: Temp: 98.2 °F (36.8 °C), Temperature Source: Temp Source: Axillary  Recent Labs     01/20/23  1009 01/20/23  0514 01/19/23  1341 01/19/23  1213   BUN 18 22*   < > 34*   CREA 1.26* 1.45*   < > 2.68*   PLT  --  245   < > 357   INR  --   --   --  1.0   WBC  --  10.5   < > 8.3    < > = values in this interval not displayed. Allergies: Patient has no known allergies. PROCEDURE DETAIL  A single lumen midline IV catheter was started for desire for reliable access. The following documentation is in addition to the Midline properties in the lines/airways flowsheet :  Xylocaine 1% used intradermally  Lot #: [id number]  Catheter Total Length: 13 (cm)  External Catheter Length: 0 (cm)  Circumference: 33 (cm)  Vein Selection for Midline: left brachial  Complication Related to Insertion: Patient's basilic vein deep, cephalic vein small, brachial vein very deep but able to access and get brisk blood return. Line is okay to use.

## 2023-01-20 NOTE — PROCEDURES
EEG Session Report  Patient Name: Tory Hernandez  Medical ID: [id number]  YOB: 1945  Age: 78  Session Duration:  Jan 19, 2023 5:32 PM -  Jan 19, 2023 6:01 PM  Recording Total Time: 00:28:20  Ordering Physician: Lloyd Palacios  Primary Indication: Prior Seizure  Location: ICU/Floor    Description of procedure: This EEG was obtained using a 10 lead, 8 channel system positioned circumferentially without any parasagittal coverage (rapid EEG). Computer selected EEG is reviewed as well as background features and all clinically significant events. Clarity algorithm utilized and implemented to provide analysis of underlying activity and seizure detection used to facilitate reading. Description of recording: Background consists of medium voltage rhythms in the 5 to 7 Hz frequency range out of the posterior head region with slower frequencies seen on the right. A pseudo periodic sharp wave discharge was also noted out of the right temporal region. Impressions: Frequent, periodic appearing sharp wave discharges seen out of the right temporal region which may indicate a seizure focus. However no electrographic seizure activity was noted.     Comments: Consider prolonged EEG with video monitoring for further clarification    Report prepared by: N/A  Report generated on: Jan 20, 2023 2:54 PM [facility]

## 2023-01-20 NOTE — PROGRESS NOTES
[facility] Progress Note    Patient: Christi Tijerina MRN: [id number]  SSN: [id number]    YOB: 1945  Age: 78 y.o. Sex: female      Subjective:      Christi Tijerina is a 78 y.o. female who has a PMH of DM2 and HTN and was found on the floor by her family with LKW at 0800. Family called EMS and the patient had a witnessed seizure with R sided gaze deviation that aborted with 2mg IV ativan. She was intubated for airway protection. Glucose was found to be 1157, LA 2.38. Corrected NA was 147. She loaded with 2g of keppra and transferred to [facility]. Upon arrival to [facility], she was intubated, not on sedation at the time of arrival and not requiring vasopressors. CT head unrevealing. MRI ordered, but vent compatible MRI machine has been unavailable. No past medical history on file. No past surgical history on file. No family history on file. Social History     Tobacco Use    Smoking status: Not on file    Smokeless tobacco: Not on file   Substance Use Topics    Alcohol use: Not on file      Prior to Admission medications    Not on File        No Known Allergies    Review of Systems:  Unable to obtain due to AMS    Objective:     Vitals:    01/20/23 1200 01/20/23 1212 01/20/23 1234 01/20/23 1300   BP: 94/64   (!) 86/53   Pulse: 76   73   Resp: 19   17   Temp: 98.2 °F (36.8 °C)      SpO2: 100%   100%   Weight:   90.9 kg (200 lb 6.4 oz)    Height:  5' 3\" (1.6 m)          Physical Exam:  GENERAL: Intubated; non-responsive  EYE: Pinpoint pupils, does not blink to threat  THROAT & NECK: Supple, no JVD  LUNG: CTAB, on MV, diminished bases  HEART: RRR, 2+ pulses, no edema  ABDOMEN: Obese, soft, nontender, nondistended  EXTREMITIES:  2+ pulses, no edema  SKIN: c/d/i  NEUROLOGIC: Does not open eyes, does not withdraw to painful stimuli in any extremity, does not exhibit purposeful or non-purposeful movement;  No involuntary movement  PSYCHIATRIC: JODY    Assessment and Plan     Seizure: New onset, no prior history of seizure: Possibly due to severe hyperglycemia/HHS. CT head negative  - Treatment of HHS as below  - Keppra  - EEG while holding propofol  - MRI  - Neurology consult    DM2 c/b hyperglycemia: Initially HHS:    - DKA protocol insulin drip  - Additional LR bolus today  - DM education when able  - A1c pending    Toxic metabolic encephalopathy: Due to Seizure/HHS/Delirium  - Treatment of underlying conditions  - Hold propofol today for neuro exam  - MRI ordered    JORGE: likely due to prerenal state  - Aggressive fluid resuscitation      Sunnyjill Yepezo [address]  I had a face to face encounter with the patient, reviewed and interpreted patient data including clinical events, labs, images, vital signs, I/O's, and examined patient. I have discussed the case and the plan and management of the patient's care with the consulting services, the bedside nurses and the respiratory therapist.       NOTE OF PERSONAL INVOLVEMENT IN CARE   This patient has a high probability of imminent, clinically significant deterioration, which requires the highest level of preparedness to intervene urgently. I participated in the decision-making and personally managed or directed the management of the following life and organ supporting interventions that required my frequent assessment to treat or prevent imminent deterioration. I personally spent 45 minutes of critical care time. This is time spent at this critically ill patient's bedside actively involved in patient care as well as the coordination of care and discussions with the patient's family. This does not include any procedural time which has been billed separately.     Santana Paige NP  [facility]  1/20/2023          Signed By: Santana Paige NP     January 20, 2023

## 2023-01-20 NOTE — PROGRESS NOTES
0730- Bedside and Verbal shift change report given to Armen Flowers RN (oncoming nurse) by Ayla Lee (offgoing nurse). Report included the following information SBAR, Kardex, Procedure Summary, Intake/Output, MAR, Recent Results, Cardiac Rhythm NSR, and Dual Neuro Assessment. 0800-BP 97/64 (71), received order from NP to give one L LR bolus    1020-ordered KUB to check placement of OG tube    1820-decreasing propofol dose per Sam Simental NP, patient biting on ETT and moving right hand toward ETT, notified NP, who ordered Precedex and stop Propofol, and place patient in bilat wrist restraints.

## 2023-01-20 NOTE — DIABETES MGMT
[facility]  [facility]  DIABETES MANAGEMENT CONSULT    Consulted by  Doris Zimmerman NP  for advanced nursing evaluation and care for inpatient blood glucose management. Evaluation and Action Plan   Brady Montes De Oca is a 78year old female with Type 2 Diabetes who is admitted with HHS with coma and status epilepticus. A1C pending and no diabetes related history is available. BG on admission was 1157, AG 12, urine with negative ketones. HHS now resolved with insulin gtt and IVF. Given little is known about her diabetes history and no A1C on file. Please start weight based insulin therapy. Will continue corrective insulin to address gaps in insulin dosing. Management Rationale Action Plan   Medication   Transition off insulin gtt with:   Basal needs Using 0.2 units/kg/D Start 0.2 units/kg/day. First dose now: Lantus 18 units and stop insulin gtt 2 hrs later. If fasting BG over 180mg/dl tomorrow increase to 0.3 units/kg   Corrective insulin Using normal sensitivity Normal sensitivity ACHS   Lab [x]        Hemoglobin A1c (pending)     Additional orders   If enteral feeds are started, please add 1   unit Humalog for every 10g CHO to total   basal dose      Switch IVF to non-dextrose IVF when insulin   gtt stopped          Initial Presentation   Brady Montes De Oca is a 78 y.o. female who was transferred from [facility] after being found down and unresponsive with tremors at home by family on 1/19/23. On arrival to the ED, she was seizing, ativan was given and intubated for airway protection. In the ED< her BP was 226/170, . LAB: UA: 1000+ glucosuria, , BG 1217, Creat 2.31, , , Alk Phos 533, Lac 2.4, trop 64. AG 12, GFR 22  CXR: normal cardiomediastinal silhouette. Right basilar atelectasis. Haziness left lung base likely due to body habitus/soft tissue attenuation. The osseous structures are unremarkable. Right basilar atelectasis.  Probable soft tissue attenuation over the left lung base. Head CT: No large vessel occlusion. No acute pathology in the head and neck. Microvascular ischemic and other age-related changes. HX: Type 2 Diabetes, HTN    INITIAL DX:   Seizures ([address] [R56.9]     Current Treatment     TX: Insulin gtt, EEG    Hospital Course   Clinical progress has been uncomplicated. 1/19: ICU admission. Intubation   1/20: Neurology Consult: continuous EEG, MRI when able. Diabetes History   Type 2 Diabetes: History unknown  Ambulatory BG management provided by: Unknown  Family History Unknown    Diabetes-related Medical History  Acute complications  HHNK      Diabetes Medication History: Unknown      Diabetes self-management practices: Unknown       Subjective        Objective   Physical exam  General Overweight AA Female, intubated in the ICU  Neuro  Sedated  Vital Signs Visit Vitals  BP (!) 87/64 (BP 1 Location: Left upper arm, BP Patient Position: At rest)   Pulse 66   Temp 97.8 °F (36.6 °C)   Resp 16   SpO2 100%     Skin  Warm and dry. Acanthosis noted along neckline. Heart   Regular rate and rhythm. No murmurs, rubs or gallops  Lungs  Clear to auscultation without rales or rhonchi  Extremities No foot wounds        Laboratory  Recent Labs     01/20/23  0514 01/20/23  0005 01/19/23  2042 01/19/23  1718 01/19/23  1426 01/19/23  1341 01/19/23  1213   * 140* 381* 792*   < > 1,217* Hemolyzed, recollect requested   AGAP 5 6 7 11   < > 12 17*   WBC 10.5  --   --  9.2  --   --  8.3   CREA 1.45* 1.55* 2.05* 2.00*   < > 2.31* 2.68*   AST  --   --   --  586*  --  809* Hemolyzed, recollect requested   ALT  --   --   --  298*  --  348* Hemolyzed, recollect requested    < > = values in this interval not displayed.        Factors impacting BG management  Factor Dose Comments   Nutrition:   NPO    Kidney function JORGE  GFR 22 on admit, 38 now    Other:   Liver function   Elevated liver enzymes      Blood glucose pattern    Significant diabetes-related events over the past 24-72 hours  A1C in process  Am labs: Na 136, , GFR 38   On D5 1/2 NS at 150ml/hr with insulin gtt    Assessment and Nursing Intervention   Nursing Diagnosis Risk for unstable blood glucose pattern   Nursing Intervention Domain 2488 Decision-making Support   Nursing Interventions Examined current inpatient diabetes/blood glucose control   Explored factors facilitating and impeding inpatient management  Explored corrective strategies with patient and responsible inpatient provider   Informed patient of rational for insulin strategy while hospitalized     Billing Code(s)     [x] 50401 IP initial hospital care - 55 minutes     Before making these care recommendations, I personally reviewed the hospitalization record, including notes, laboratory & diagnostic data and current medications, and examined the patient at the bedside (circumstances permitting) before determining care. More than fifty (50) percent of the time was spent in patient counseling and/or care coordination.   Total minutes: 60    MURALI Meraz  Diabetes Clinical Nurse Specialist  [facility]  Access via [employer]

## 2023-01-21 ENCOUNTER — APPOINTMENT (OUTPATIENT)
Dept: GENERAL RADIOLOGY | Age: 78
DRG: 637 | End: 2023-01-21
Attending: NURSE PRACTITIONER
Payer: MEDICARE

## 2023-01-21 LAB
ADMINISTERED INITIALS, ADMINIT: NORMAL
ANION GAP SERPL CALC-SCNC: 6 MMOL/L (ref 5–15)
ARTERIAL PATENCY WRIST A: POSITIVE
BASE EXCESS BLD CALC-SCNC: 0.9 MMOL/L
BASOPHILS # BLD: 0 K/UL (ref 0–0.1)
BASOPHILS NFR BLD: 0 % (ref 0–1)
BDY SITE: ABNORMAL
BUN SERPL-MCNC: 17 MG/DL (ref 6–20)
BUN/CREAT SERPL: 15 (ref 12–20)
CALCIUM SERPL-MCNC: 8 MG/DL (ref 8.5–10.1)
CHLORIDE SERPL-SCNC: 104 MMOL/L (ref 97–108)
CO2 SERPL-SCNC: 26 MMOL/L (ref 21–32)
CREAT SERPL-MCNC: 1.16 MG/DL (ref 0.55–1.02)
D50 ADMINISTERED, D50ADM: 0 ML
D50 ORDER, D50ORD: 0 ML
DIFFERENTIAL METHOD BLD: ABNORMAL
EOSINOPHIL # BLD: 0.1 K/UL (ref 0–0.4)
EOSINOPHIL NFR BLD: 1 % (ref 0–7)
ERYTHROCYTE [DISTWIDTH] IN BLOOD BY AUTOMATED COUNT: 12.6 % (ref 11.5–14.5)
GAS FLOW.O2 O2 DELIVERY SYS: ABNORMAL
GLUCOSE BLD STRIP.AUTO-MCNC: 112 MG/DL (ref 65–117)
GLUCOSE BLD STRIP.AUTO-MCNC: 129 MG/DL (ref 65–117)
GLUCOSE BLD STRIP.AUTO-MCNC: 131 MG/DL (ref 65–117)
GLUCOSE BLD STRIP.AUTO-MCNC: 154 MG/DL (ref 65–117)
GLUCOSE BLD STRIP.AUTO-MCNC: 166 MG/DL (ref 65–117)
GLUCOSE BLD STRIP.AUTO-MCNC: 168 MG/DL (ref 65–117)
GLUCOSE BLD STRIP.AUTO-MCNC: 220 MG/DL (ref 65–117)
GLUCOSE BLD STRIP.AUTO-MCNC: 381 MG/DL (ref 65–117)
GLUCOSE SERPL-MCNC: 180 MG/DL (ref 65–100)
GLUCOSE, GLC: 112 MG/DL
GLUCOSE, GLC: 129 MG/DL
GLUCOSE, GLC: 131 MG/DL
GLUCOSE, GLC: 154 MG/DL
GLUCOSE, GLC: 168 MG/DL
HCO3 BLD-SCNC: 23.5 MMOL/L (ref 22–26)
HCT VFR BLD AUTO: 27.3 % (ref 35–47)
HGB BLD-MCNC: 8.8 G/DL (ref 11.5–16)
HIGH TARGET, HITG: 200 MG/DL
IMM GRANULOCYTES # BLD AUTO: 0.1 K/UL (ref 0–0.04)
IMM GRANULOCYTES NFR BLD AUTO: 1 % (ref 0–0.5)
INSULIN ADMINSTERED, INSADM: 1.2 UNITS/HOUR
INSULIN ADMINSTERED, INSADM: 2.3 UNITS/HOUR
INSULIN ADMINSTERED, INSADM: 2.5 UNITS/HOUR
INSULIN ADMINSTERED, INSADM: 3.1 UNITS/HOUR
INSULIN ADMINSTERED, INSADM: 3.1 UNITS/HOUR
INSULIN ORDER, INSORD: 1.2 UNITS/HOUR
INSULIN ORDER, INSORD: 2.3 UNITS/HOUR
INSULIN ORDER, INSORD: 2.5 UNITS/HOUR
INSULIN ORDER, INSORD: 3.1 UNITS/HOUR
INSULIN ORDER, INSORD: 3.1 UNITS/HOUR
LOW TARGET, LOT: 150 MG/DL
LYMPHOCYTES # BLD: 0.9 K/UL (ref 0.8–3.5)
LYMPHOCYTES NFR BLD: 12 % (ref 12–49)
MAGNESIUM SERPL-MCNC: 2.5 MG/DL (ref 1.6–2.4)
MCH RBC QN AUTO: 28.3 PG (ref 26–34)
MCHC RBC AUTO-ENTMCNC: 32.2 G/DL (ref 30–36.5)
MCV RBC AUTO: 87.8 FL (ref 80–99)
MINUTES UNTIL NEXT BG, NBG: 60 MIN
MONOCYTES # BLD: 0.6 K/UL (ref 0–1)
MONOCYTES NFR BLD: 7 % (ref 5–13)
MULTIPLIER, MUL: 0.02
MULTIPLIER, MUL: 0.02
MULTIPLIER, MUL: 0.03
MULTIPLIER, MUL: 0.03
MULTIPLIER, MUL: 0.04
NEUTS SEG # BLD: 6 K/UL (ref 1.8–8)
NEUTS SEG NFR BLD: 79 % (ref 32–75)
NRBC # BLD: 0 K/UL (ref 0–0.01)
NRBC BLD-RTO: 0 PER 100 WBC
O2/TOTAL GAS SETTING VFR VENT: 30 %
ORDER INITIALS, ORDINIT: NORMAL
PCO2 BLD: 30.2 MMHG (ref 35–45)
PEEP RESPIRATORY: 5 CMH2O
PH BLD: 7.5 (ref 7.35–7.45)
PHOSPHATE SERPL-MCNC: 1.5 MG/DL (ref 2.6–4.7)
PHOSPHATE SERPL-MCNC: 3.1 MG/DL (ref 2.6–4.7)
PLATELET # BLD AUTO: 197 K/UL (ref 150–400)
PMV BLD AUTO: 10.5 FL (ref 8.9–12.9)
PO2 BLD: 86 MMHG (ref 80–100)
POTASSIUM SERPL-SCNC: 3.8 MMOL/L (ref 3.5–5.1)
PRESSURE SUPPORT SETTING VENT: 5 CMH2O
RBC # BLD AUTO: 3.11 M/UL (ref 3.8–5.2)
SAO2 % BLD: 97.5 % (ref 92–97)
SERVICE CMNT-IMP: ABNORMAL
SERVICE CMNT-IMP: NORMAL
SODIUM SERPL-SCNC: 136 MMOL/L (ref 136–145)
SPECIMEN TYPE: ABNORMAL
VENTILATION MODE VENT: ABNORMAL
WBC # BLD AUTO: 7.6 K/UL (ref 3.6–11)

## 2023-01-21 PROCEDURE — 99232 SBSQ HOSP IP/OBS MODERATE 35: CPT | Performed by: PSYCHIATRY & NEUROLOGY

## 2023-01-21 PROCEDURE — 74011250637 HC RX REV CODE- 250/637: Performed by: NURSE PRACTITIONER

## 2023-01-21 PROCEDURE — 74011000258 HC RX REV CODE- 258: Performed by: NURSE PRACTITIONER

## 2023-01-21 PROCEDURE — 36600 WITHDRAWAL OF ARTERIAL BLOOD: CPT

## 2023-01-21 PROCEDURE — 65610000006 HC RM INTENSIVE CARE

## 2023-01-21 PROCEDURE — 74011250636 HC RX REV CODE- 250/636: Performed by: NURSE PRACTITIONER

## 2023-01-21 PROCEDURE — 84100 ASSAY OF PHOSPHORUS: CPT

## 2023-01-21 PROCEDURE — 74011000250 HC RX REV CODE- 250: Performed by: NURSE PRACTITIONER

## 2023-01-21 PROCEDURE — 95720 EEG PHY/QHP EA INCR W/VEEG: CPT | Performed by: PSYCHIATRY & NEUROLOGY

## 2023-01-21 PROCEDURE — 94003 VENT MGMT INPAT SUBQ DAY: CPT

## 2023-01-21 PROCEDURE — 82962 GLUCOSE BLOOD TEST: CPT

## 2023-01-21 PROCEDURE — 83735 ASSAY OF MAGNESIUM: CPT

## 2023-01-21 PROCEDURE — 82803 BLOOD GASES ANY COMBINATION: CPT

## 2023-01-21 PROCEDURE — 74011636637 HC RX REV CODE- 636/637: Performed by: NURSE PRACTITIONER

## 2023-01-21 PROCEDURE — 87040 BLOOD CULTURE FOR BACTERIA: CPT

## 2023-01-21 PROCEDURE — 80048 BASIC METABOLIC PNL TOTAL CA: CPT

## 2023-01-21 PROCEDURE — 71045 X-RAY EXAM CHEST 1 VIEW: CPT

## 2023-01-21 PROCEDURE — 36415 COLL VENOUS BLD VENIPUNCTURE: CPT

## 2023-01-21 PROCEDURE — 85025 COMPLETE CBC W/AUTO DIFF WBC: CPT

## 2023-01-21 RX ORDER — FENTANYL CITRATE 50 UG/ML
INJECTION, SOLUTION INTRAMUSCULAR; INTRAVENOUS
Status: DISPENSED
Start: 2023-01-21 | End: 2023-01-21

## 2023-01-21 RX ORDER — INSULIN GLARGINE 100 [IU]/ML
8 INJECTION, SOLUTION SUBCUTANEOUS ONCE
Status: COMPLETED | OUTPATIENT
Start: 2023-01-21 | End: 2023-01-22

## 2023-01-21 RX ORDER — FUROSEMIDE 10 MG/ML
40 INJECTION INTRAMUSCULAR; INTRAVENOUS ONCE
Status: COMPLETED | OUTPATIENT
Start: 2023-01-21 | End: 2023-01-21

## 2023-01-21 RX ORDER — AMLODIPINE BESYLATE 5 MG/1
5 TABLET ORAL DAILY
Status: DISCONTINUED | OUTPATIENT
Start: 2023-01-21 | End: 2023-01-23

## 2023-01-21 RX ORDER — LACOSAMIDE 10 MG/ML
50 INJECTION, SOLUTION INTRAVENOUS EVERY 12 HOURS
Status: DISCONTINUED | OUTPATIENT
Start: 2023-01-22 | End: 2023-01-23

## 2023-01-21 RX ORDER — HYDRALAZINE HYDROCHLORIDE 20 MG/ML
20 INJECTION INTRAMUSCULAR; INTRAVENOUS
Status: DISCONTINUED | OUTPATIENT
Start: 2023-01-21 | End: 2023-01-26 | Stop reason: HOSPADM

## 2023-01-21 RX ORDER — INSULIN GLARGINE 100 [IU]/ML
22 INJECTION, SOLUTION SUBCUTANEOUS EVERY 12 HOURS
Status: DISCONTINUED | OUTPATIENT
Start: 2023-01-22 | End: 2023-01-23

## 2023-01-21 RX ORDER — INSULIN LISPRO 100 [IU]/ML
5 INJECTION, SOLUTION INTRAVENOUS; SUBCUTANEOUS ONCE
Status: COMPLETED | OUTPATIENT
Start: 2023-01-21 | End: 2023-01-21

## 2023-01-21 RX ORDER — INSULIN LISPRO 100 [IU]/ML
INJECTION, SOLUTION INTRAVENOUS; SUBCUTANEOUS EVERY 6 HOURS
Status: DISCONTINUED | OUTPATIENT
Start: 2023-01-21 | End: 2023-01-22

## 2023-01-21 RX ORDER — FENTANYL CITRATE 50 UG/ML
50 INJECTION, SOLUTION INTRAMUSCULAR; INTRAVENOUS ONCE
Status: COMPLETED | OUTPATIENT
Start: 2023-01-21 | End: 2023-01-21

## 2023-01-21 RX ORDER — LEVOTHYROXINE SODIUM 100 UG/1
100 TABLET ORAL
Status: DISCONTINUED | OUTPATIENT
Start: 2023-01-22 | End: 2023-01-26 | Stop reason: HOSPADM

## 2023-01-21 RX ORDER — DEXAMETHASONE SODIUM PHOSPHATE 10 MG/ML
6 INJECTION INTRAMUSCULAR; INTRAVENOUS EVERY 12 HOURS
Status: COMPLETED | OUTPATIENT
Start: 2023-01-21 | End: 2023-01-22

## 2023-01-21 RX ADMIN — LEVETIRACETAM 750 MG: 100 INJECTION, SOLUTION, CONCENTRATE INTRAVENOUS at 12:03

## 2023-01-21 RX ADMIN — FUROSEMIDE 40 MG: 10 INJECTION, SOLUTION INTRAVENOUS at 10:58

## 2023-01-21 RX ADMIN — SODIUM CHLORIDE, PRESERVATIVE FREE 10 ML: 5 INJECTION INTRAVENOUS at 21:41

## 2023-01-21 RX ADMIN — Medication 3 UNITS: at 06:18

## 2023-01-21 RX ADMIN — NYSTATIN OINTMENT: 100000 OINTMENT TOPICAL at 19:05

## 2023-01-21 RX ADMIN — FENTANYL CITRATE 50 MCG: 50 INJECTION, SOLUTION INTRAMUSCULAR; INTRAVENOUS at 06:42

## 2023-01-21 RX ADMIN — HYDRALAZINE HYDROCHLORIDE 20 MG: 20 INJECTION INTRAMUSCULAR; INTRAVENOUS at 10:57

## 2023-01-21 RX ADMIN — POLYVINYL ALCOHOL, POVIDONE 1 DROP: .5; .6 LIQUID OPHTHALMIC at 12:03

## 2023-01-21 RX ADMIN — AMLODIPINE BESYLATE 5 MG: 5 TABLET ORAL at 12:03

## 2023-01-21 RX ADMIN — POTASSIUM PHOSPHATE, MONOBASIC AND POTASSIUM PHOSPHATE, DIBASIC: 224; 236 INJECTION, SOLUTION, CONCENTRATE INTRAVENOUS at 00:38

## 2023-01-21 RX ADMIN — INSULIN GLARGINE 36 UNITS: 100 INJECTION, SOLUTION SUBCUTANEOUS at 21:44

## 2023-01-21 RX ADMIN — HEPARIN SODIUM 5000 UNITS: 5000 INJECTION INTRAVENOUS; SUBCUTANEOUS at 18:53

## 2023-01-21 RX ADMIN — NYSTATIN OINTMENT: 100000 OINTMENT TOPICAL at 08:42

## 2023-01-21 RX ADMIN — POLYVINYL ALCOHOL, POVIDONE 1 DROP: .5; .6 LIQUID OPHTHALMIC at 19:05

## 2023-01-21 RX ADMIN — Medication 5 UNITS: at 18:53

## 2023-01-21 RX ADMIN — SODIUM CHLORIDE, PRESERVATIVE FREE 10 ML: 5 INJECTION INTRAVENOUS at 05:46

## 2023-01-21 RX ADMIN — DEXAMETHASONE SODIUM PHOSPHATE 6 MG: 10 INJECTION INTRAMUSCULAR; INTRAVENOUS at 18:53

## 2023-01-21 RX ADMIN — Medication 4 UNITS: at 12:03

## 2023-01-21 RX ADMIN — INSULIN GLARGINE 36 UNITS: 100 INJECTION, SOLUTION SUBCUTANEOUS at 00:31

## 2023-01-21 RX ADMIN — FAMOTIDINE 20 MG: 20 TABLET, FILM COATED ORAL at 08:42

## 2023-01-21 RX ADMIN — HEPARIN SODIUM 5000 UNITS: 5000 INJECTION INTRAVENOUS; SUBCUTANEOUS at 08:42

## 2023-01-21 RX ADMIN — SODIUM CHLORIDE, PRESERVATIVE FREE 10 ML: 5 INJECTION INTRAVENOUS at 14:51

## 2023-01-21 RX ADMIN — CHLORHEXIDINE GLUCONATE 15 ML: 1.2 RINSE ORAL at 08:43

## 2023-01-21 RX ADMIN — Medication 10 UNITS: at 18:54

## 2023-01-21 RX ADMIN — CHLORHEXIDINE GLUCONATE 15 ML: 1.2 RINSE ORAL at 21:41

## 2023-01-21 RX ADMIN — HEPARIN SODIUM 5000 UNITS: 5000 INJECTION INTRAVENOUS; SUBCUTANEOUS at 00:06

## 2023-01-21 NOTE — PROGRESS NOTES
Neurology Staff Addendum:  I have personally seen and examined the patient. I have personally reviewed the chart and images. Elements of my examination included history of present illness, review of systems, review of past medical and surgical history, review of medications, and physical and neurological examination. I have personally reviewed the findings and impressions with the nurse practitioner and am in agreement with their note with changes below. Pt is a 79yo female with DM, HTN, admitted on transfer from [facility] on 1/19/23 after found down at home with tremulous movements, right gaze deviation, and unresponsive. Pt had a witnessed sz in ED, given Ativan and Keppra. CTH neg. CTA H/N no LVO or significant stenosis. Glu 1217, with hyponatremia, BUN/Cr 32/2. 31. AST//348. Pt had recurrent seizure activity on 1/23/23 with O2 sats in 70's, left gaze deviation, loss of bowel control, and Vimpat 200mg given. Pt had tremulousness and Keppra was increased. Continuous EEG with right temporal periodic sharp waves. Pt is more alert, off sedation since 0845, undergoing a SBT. Blood pressure 124/67, pulse (!) 101, temperature 100 °F (37.8 °C), resp. rate 23, height 5' 3\" (1.6 m), weight 196 lb 6.9 oz (89.1 kg), SpO2 99 %. Physical Exam:  General: Well developed well nourished intubated patient in no apparent distress. Cardiac: Regular rate and rhythm with no murmurs. Extremities: 2+ Radial pulses, no cyanosis or edema    Neurological Exam:  Mental Status: Opens eyes to noxious stim, no commands. Cranial Nerves:   PERRL, no nystagmus, no ptosis. Facial movement is symmetric. Motor:  Moves bilateral UE spontaneously    Reflexes:   Deep tendon reflexes 2+ and symmetric. Toes downgoing.    Sensory:   Unable to assess due to patient factors   Gait:  Unable to assess due to patient factors   Cerebellar:  Unable to assess due to patient factors     A/P:  Pt is a 79yo female with DM, HTN, admitted on transfer from [facility] on 1/19/23 after found down at home with tremulous movements, right gaze deviation, and unresponsive. Pt had a witnessed sz in ED, given Ativan and Keppra. CTH neg. CTA H/N no LVO or significant stenosis. Glu 1217, with hyponatremia, BUN/Cr 32/2. 31. AST//348. Pt had recurrent seizure activity on 1/23/23 one dose of Vimpat given. Continuous EEG with right temporal periodic sharp waves. Exam - pt opens eyes to noxious stim, moves arm purposefully. Possible symptomatic sz due to hyperglycemia. MRI brain when able. Continue Keppra 750mg bid. Continue to wean sedation. Josue Araya MD  [facility]        Neurology  Progress Note  Courtney Thornton NP    Admit Date: 1/19/2023   LOS: 2 days      Daily Progress Note: 1/21/2023    C/C:   New Onset Seizure     HPI:   Samira Monroe is a 78 y.o. F with presumed pmh of diabetes who presented to OSH [address] [facility] [facility] 01/19/23 via EMS after patient's family found her on the floor with tremulous movements associated with right gaze deviation, and unresponsive. Upon arrival to the ED, patient was noted to be seizing thus was given 2mg of Ativan and loaded with 2g Keppra with maintenance dose of 500mg BID. CTH showed no acute intracranial abnormalities. CTA H/N showed no large vessel occlusion. No acute pathology in the head and neck. Patient was noted to have hyponatremia of 114 and blood glucose level >1,200. Patient also noted with elevated lactic acid, elevated liver enzymes, and elevated renal function. The patient was intubated for airway protection and transferred to [facility] ICU via [facility]. Neurology was consulted upon arrival to [facility] ICU. Rapid EEG was obtained upon arrival to ICU. Around 2300, patient was noted with possible seizure activities. Spo2 dropped to 70's with hypotension, Left gaze deviation, and loss of bowel control. Patient was loaded with Vimpat 200mg with no maintenance dose given elevated liver function. Around 04:30 am, primary nurse reported patient having tremulous movements in BUE which was witnessed by neuro critical care NP. Propofol was increased from 25mcg to 35 mcg which seems to thai seizure activities. Keppra was increased from 500 to 750mg bid given poor renal function. SUBJECTIVE:   No acute events noted overnight. On cvEEG concerning with intermittent sharps. X1 dose of vimpat 100mg given which seems to improve. No Known Allergies      No past medical history on file. No family history on file. Social History     Tobacco Use    Smoking status: Not on file    Smokeless tobacco: Not on file   Substance Use Topics    Alcohol use: Not on file      Prior to Admission Medications   Prescriptions Last Dose Informant Patient Reported? Taking?   acetaminophen (TylenoL) 325 mg tablet   Yes Yes   Sig: Take 500 mg by mouth every four (4) hours as needed for Pain. atorvastatin (LIPITOR) 40 mg tablet   Yes Yes   Sig: Take 40 mg by mouth daily. carvediloL (COREG) 12.5 mg tablet   Yes Yes   Sig: Take 12.5 mg by mouth once over twenty-four (24) hours. furosemide (LASIX) 40 mg tablet   Yes Yes   Sig: Take 40 mg by mouth daily. levothyroxine (SYNTHROID) 100 mcg tablet   Yes Yes   Sig: Take 125 mcg by mouth Daily (before breakfast). Indications: a condition with low thyroid hormone levels   losartan (COZAAR) 100 mg tablet   Yes Yes   Sig: Take 100 mg by mouth daily. Indications: high blood pressure   metFORMIN (GLUCOPHAGE) 850 mg tablet   Yes Yes   Sig: Take 850 mg by mouth two (2) times daily (with meals).       Facility-Administered Medications: None         OBJECTIVES:   Temp (24hrs), Av.6 °F (37 °C), Min:97.8 °F (36.6 °C), Max:99 °F (37.2 °C)   1901 -  0700  In: 520 [I.V.:300]  Out: 125 [Urine:125]  701 - 1900  In: 9751.1 [I.V.:9581.1]  Out:  [Urine:2345]  Visit Vitals  /80 (BP 1 Location: Left lower arm, BP Patient Position: At rest;Lying right side)   Pulse (!) 57   Temp 99 °F (37.2 °C) Comment: removed blanket, kept sheet   Resp 17   Ht 5' 3\" (1.6 m)   Wt 90.9 kg (200 lb 6.4 oz)   SpO2 100%   BMI 35.50 kg/m²      O2 Device: Endotracheal tube, Ventilator   Vitals:    01/20/23 2200 01/20/23 2300 01/21/23 0000 01/21/23 0045   BP: 105/71 123/76 113/80    Pulse: (!) 59 66 (!) 57 (!) 57   Resp: 15 26 16 17   Temp:   99 °F (37.2 °C)    SpO2: 100% 100% 100% 100%   Weight:       Height:            Meds:     Current Facility-Administered Medications   Medication Dose Route Frequency    potassium phosphate 15 mmol in dextrose 5% 250 mL infusion   IntraVENous ONCE    levETIRAcetam (KEPPRA) injection 750 mg  750 mg IntraVENous Q12H    dexmedeTOMidine in 0.9 % NaCl (PRECEDEX) 400 mcg/100 mL (4 mcg/mL) infusion soln  0.1-1.5 mcg/kg/hr IntraVENous TITRATE    glucose chewable tablet 16 g  4 Tablet Oral PRN    glucagon (GLUCAGEN) injection 1 mg  1 mg IntraMUSCular PRN    dextrose 10 % infusion 0-250 mL  0-250 mL IntraVENous PRN    insulin glargine (LANTUS) injection 36 Units  0.4 Units/kg SubCUTAneous QHS    insulin lispro (HUMALOG) injection   SubCUTAneous Q6H    sodium chloride (NS) flush 5-40 mL  5-40 mL IntraVENous Q8H    sodium chloride (NS) flush 5-40 mL  5-40 mL IntraVENous PRN    acetaminophen (TYLENOL) tablet 650 mg  650 mg Oral Q6H PRN    Or    acetaminophen (TYLENOL) suppository 650 mg  650 mg Rectal Q6H PRN    polyethylene glycol (MIRALAX) packet 17 g  17 g Oral DAILY PRN    ondansetron (ZOFRAN ODT) tablet 4 mg  4 mg Oral Q8H PRN    Or    ondansetron (ZOFRAN) injection 4 mg  4 mg IntraVENous Q6H PRN    propofol (DIPRIVAN) 10 mg/mL infusion  0-50 mcg/kg/min IntraVENous TITRATE    insulin regular (NOVOLIN R, HUMULIN R) 100 Units in 0.9% sodium chloride 100 mL infusion  0-50 Units/hr IntraVENous TITRATE    nystatin (MYCOSTATIN) 100,000 unit/gram ointment   Topical BID    heparin (porcine) injection 5,000 Units  5,000 Units SubCUTAneous Q8H    famotidine (PEPCID) tablet 20 mg 20 mg Oral DAILY    chlorhexidine (ORAL CARE KIT) 0.12 % mouthwash 15 mL  15 mL Oral Q12H    dextrose 5 % - 0.45% NaCl infusion  75 mL/hr IntraVENous CONTINUOUS     I personally reviewed all of the medications    Review of Systems:   Review of systems not obtained due to patient factors. Unresponsive on ventilator and sedated therefore too cognitively or communication impaired to participate in ROS. Physical Exam:   Blood pressure 113/80, pulse (!) 57, temperature 99 °F (37.2 °C), resp. rate 17, height 5' 3\" (1.6 m), weight 90.9 kg (200 lb 6.4 oz), SpO2 100 %. GEN: calm, ill-appearing female patient in NAD  HEENT: Normocephalic. Non-icter, no congestion  Lungs: diminished bilaterally Ant; intubated on vent  Cardiac: S1,S2, normal rate and rhythm, with no murmurs. no carotid bruits, no gallops  Abdomen: Normal bowel sounds, no distention, soft, non-tender  Extremities: 2+ Radial pulses, no clubbing, cyanosis, or edema  Skin: no rashes or lesions noted        NEURO:  Mental status: unresponsive, intubated on vent and off propofol. On 0.4 mcg precedex  Cranial Nerves: ET-tube inplace~No Speech accessed, PERRL, R eye with Left gaze deviation, no facial asymmetry noted around ET-tube. weak gag/cough, sluggish pupils and corneal  Motor: Normal bulk and tone, no spontaneous movement noted. No withdraws to RUE, withdraws x 3 extremities to deep pain;  No involuntary movements Reflexes: +1 throughout, up-going toes bilaterally   Sensation: Intact x 3 extremities to pain; no withdrawal to RUE  Gait:  Deferred     Labs/images:     Lab Results   Component Value Date/Time    WBC 10.5 01/20/2023 05:14 AM    HGB 9.6 (L) 01/20/2023 05:14 AM    HCT 29.3 (L) 01/20/2023 05:14 AM    PLATELET 098 82/47/8678 05:14 AM    MCV 85.4 01/20/2023 05:14 AM      Lab Results   Component Value Date/Time    Sodium 137 01/20/2023 10:21 PM    Potassium 3.3 (L) 01/20/2023 10:21 PM    Chloride 104 01/20/2023 10:21 PM    CO2 28 01/20/2023 10:21 PM Anion gap 5 01/20/2023 10:21 PM    Glucose 178 (H) 01/20/2023 10:21 PM    BUN 15 01/20/2023 10:21 PM    Creatinine 1.22 (H) 01/20/2023 10:21 PM    BUN/Creatinine ratio 12 01/20/2023 10:21 PM    Calcium 8.1 (L) 01/20/2023 10:21 PM    Bilirubin, total 0.5 01/19/2023 05:18 PM    Alk. phosphatase 480 (H) 01/19/2023 05:18 PM    Protein, total 6.7 01/19/2023 05:18 PM    Albumin 3.3 (L) 01/19/2023 05:18 PM    Globulin 3.4 01/19/2023 05:18 PM    A-G Ratio 1.0 (L) 01/19/2023 05:18 PM    ALT (SGPT) 298 (H) 01/19/2023 05:18 PM    AST (SGOT) 586 (H) 01/19/2023 05:18 PM       CT Results (most recent):  Results from Hospital Encounter encounter on 01/19/23    CT CODE NEURO HEAD WO CONTRAST    Narrative  INDICATION: stroke, lkw 8am, tremors, ams    EXAM: CT HEAD WITHOUT CONTRAST. COMPARISON: None . PROCEDURE: Unenhanced head CT. Axial images were obtained from skull base to the  vertex. Images were reformatted in the coronal and sagittal planes. Soft tissue  and bone windows were examined. No contrast. CT dose reduction was achieved  through use of a standardized protocol tailored for this examination and  automatic exposure control for dose modulation. FINDINGS: Cerebral sulci and ventricular size are mildly increased, but upper  normal with age. There are patchy diffuse mild to moderate periventricular white  matter changes. While nonspecific these are likely due to small vessel ischemic  change. There is no evidence of midline shift, extra-axial collection, mass  lesion or mass effect. No evidence of acute bleed. No acute bony abnormality. No  obvious acute ischemia. .    Impression  1. No acute intracranial abnormality. 2. Microvascular ischemic and other age-related changes.      Assessment:   Active Problems:    Seizures ([address] (1/19/2023)      Plan:   cvEEG ongoing~noted some abnormalities which seems to improve with 100mg Vimpat   Continue with keppra 750 mg bid  started low dose vimpat 50mg bid given liver enzymes elevated   Will obtain MRI Brain wwo once stable  PRN Ativan/Versed/Valium for seizure activity lasting longer than 5 minutes  Avoid fluoroquinolones and 4th generation cephalosporins as can lower seizure threshold  Seizure precautions  Q1hr neuro checks  Continue to treat any metabolic and infectious derangements      Case discussed with: primary nurse and intensivist     Chart reviewed    >35% time spent in counseling or coordination of care of the above in the assessment and plan     Signed By: Vik Prince NP                    January 21, 2023    Please note that this dictation was completed with Cynda Dandy, the computer voice recognition software. Quite often unanticipated grammatical, syntax, homophones, and other interpretive errors are inadvertently transcribed by the computer software. Please disregard these errors. Please excuse any errors that have escaped final proofreading.

## 2023-01-21 NOTE — PROCEDURES
[address]  EEG    Name:  Edmond Cramer  MR#:  [id number]  :  1945  ACCOUNT #:  [de-identified]  DATE OF SERVICE:  2023      REQUESTING PHYSICIAN:  Dr. Edwin Horta. HISTORY:  The patient is a 79-year-old female, who is being evaluated for altered mental status to rule out subclinical seizures. DESCRIPTION:  This is an 18-channel prolonged EEG with video monitoring performed on 2023. The recording starts at 12:55 p.m. and continues until 07:58 a.m. on 2023. During the early part of this recording, there is significant amount of muscle tension artifact with faster frequencies seen in the background. When the patient becomes drowsy or goes into a sleep state, periodic appearing blunted sharp wave discharges are seen out of the right frontal and temporal regions. They occur roughly once every 2-3 seconds with intervening delta frequencies. Drowsiness and sleep architecture was not clearly seen except for generalized delta waveforms. No clinical seizure-like activity was seen on the video. EEG SUMMARY:  Abnormal EEG due to periodic appearing blunted sharp wave activities seen out of the right frontal temporal region. CLINICAL INTERPRETATION:  This EEG shows focal periodic appearing potentially epileptiform discharges out of the right frontal temporal head region. This may indicate an underlying seizure focus and could be related to an underlying structural, infectious, inflammatory cause. No electrographic or clinical seizures were recorded however. Please correlate clinically and with imaging.       Elsy Gomes MD      [id number]  D:  2023 11:26  T:  2023 12:32  JOB #:  [id number]

## 2023-01-21 NOTE — PROGRESS NOTES
Overnight continuous video EEG showed periodic appearing blunted sharp wave activity on the right, indicating a potential sz focus but no electrographic or clinical seizures were noted. Full report to follow.   Lizette Maradiaga MD

## 2023-01-21 NOTE — PROGRESS NOTES
CRITICAL CARE NOTE      Name: Brady Montes De Oca   : 1945   MRN: [id number]   Date: 2023      REASON FOR ICU ADMISSION:  Status Epilepticus     PRINCIPAL ICU DIAGNOSIS     Status epilepticus  New onset seizure  HHS, poorly controlled DM2  HTN  JORGE  Acute toxic metabolic encephalopathy  Morbid obesity    BRIEF PATIENT SUMMARY     Brady Montes De Oca is a 78 y.o. female who has a PMH of DM2 and HTN and was found on the floor by her family with LKW at 0800. Family called EMS and the patient had a witnessed seizure with R sided gaze deviation that aborted with 2mg IV ativan. She was intubated for airway protection. Glucose was found to be 1157, LA 2.38. Corrected NA was 147. She loaded with 2g of keppra and transferred to [facility]. Upon arrival to [facility], she was intubated, not on sedation at the time of arrival and not requiring vasopressors. CT head unrevealing. MRI ordered, but vent compatible MRI machine has been unavailable. COMPREHENSIVE ASSESSMENT & PLAN:SYSTEM BASED     24 HOUR EVENTS: Weaned off propofol; no evidence of seizure activity;     NEUROLOGICAL:     Seizure: Initially SE: New onset, no prior history of seizure: Possibly due to severe hyperglycemia/HHS.  CT head negative  - Treatment of HHS as below  - Weaned off propofol  while on EEG; no evidence of seizure activity  - Started precedex for ventilator associated discomfort as propofol weaned off  - Keppra  - MRI  - Neurology following    Toxic metabolic encephalopathy: Due to Seizure/HHS/Delirium  - Treatment of underlying conditions  - MRI ordered  - Hold precedex to eval neuro status     PULMONOLOGY:     Mechanical ventilation: Intubated for airway protection  - SBT, but cannot extubate due to inability to protect airway    CARDIOVASCULAR:     HTN: (1046 addendum)  - Added amlodipine  - Lasix for volume overload which will also help with HTN  - PRN hydralazine for SBP >160    GASTROINTESTINAL      Nutrition:   - TF  - NST following  - H2 for SUP ppx    RENAL/ELECTROLYTE/FLUIDS:     JORGE: likely due to prerenal state, improving  - Aggressive fluid resuscitation    ENDOCRINE:     Poorly controlled DM2: Initially HHS: A1c >14  - Lantus 36 HS  - SSI    HEMATOLOGY/ONCOLOGY:     Anemia, likely due to acute illness: No evidence of current bleeding  - Transfuse to keep Hgb >7  - Daily CBC    INFECTIOUS DISEASE:       ANTIBIOTICS TO DATE: None    CULTURES TO DATE:  Blood cultures 1/21: NGTD      ICU DAILY CHECKLIST     Code Status:F  DVT Prophylaxis:heparin SC  T/L/D: Tubes: PIVs  Lines: PIVs  Drains: N  SUP: Y  Diet: TF  Activity Level:as bobby  ABCDEF Bundle/Checklist Completed:Yes  Disposition: ICU  Multidisciplinary Rounds Completed:  Y  Goals of Care Discussion/Palliative: Y  Patient/Family Updated: [address]     As per HPI    SUBJECTIVE   ROS  Unable to obtain    OBJECTIVE     Labs and Data: Reviewed 01/21/23  Medications: Reviewed 01/21/23  Imaging: Reviewed 01/21/23    Physical Exam  Vitals and nursing note reviewed. Constitutional:       Appearance: She is ill-appearing. HENT:      Head: Normocephalic. Nose: Nose normal.      Mouth/Throat:      Mouth: Mucous membranes are moist.   Eyes:      Pupils: Pupils are equal, round, and reactive to light. Cardiovascular:      Rate and Rhythm: Normal rate and regular rhythm. Pulses: Normal pulses. Pulmonary:      Breath sounds: Normal breath sounds. Comments: Intubated  Abdominal:      General: Bowel sounds are normal.      Palpations: Abdomen is soft. Comments: Obese   Musculoskeletal:      Comments: No edema   Skin:     General: Skin is warm and dry. Capillary Refill: Capillary refill takes less than 2 seconds. Neurological:      Comments: Does not open eyes to voice;  No withdrawal in RUE to noxious stimuli; + withdrawal in LUE; LLE; RLE; no purposeful movement noted   Psychiatric:      Comments: JODY        Visit Vitals  BP (!) 142/87   Pulse 65 Temp 98.7 °F (37.1 °C)   Resp 17   Ht 5' 3\" (1.6 m)   Wt 89.1 kg (196 lb 6.9 oz)   SpO2 99%   BMI 34.80 kg/m²      O2 Device: Endotracheal tube, Ventilator Temp (24hrs), Av.7 °F (37.1 °C), Min:98.2 °F (36.8 °C), Max:99 °F (37.2 °C)           Intake/Output:     Intake/Output Summary (Last 24 hours) at 2023 0819  Last data filed at 2023 0600  Gross per 24 hour   Intake 5935.67 ml   Output 1235 ml   Net 4700.67 ml       Imaging           CRITICAL CARE DOCUMENTATION  I had a face to face encounter with the patient, reviewed and interpreted patient data including clinical events, labs, images, vital signs, I/O's, and examined patient. I have discussed the case and the plan and management of the patient's care with the consulting services, the bedside nurses and the respiratory therapist.      NOTE OF PERSONAL INVOLVEMENT IN CARE   This patient has a high probability of imminent, clinically significant deterioration, which requires the highest level of preparedness to intervene urgently. I participated in the decision-making and personally managed or directed the management of the following life and organ supporting interventions that required my frequent assessment to treat or prevent imminent deterioration. I personally spent 45 minutes of critical care time. This is time spent at this critically ill patient's bedside actively involved in patient care as well as the coordination of care. This does not include any procedural time which has been billed separately.     ACP Updated family     Brandee Gonzalez NP   Critical Care Medicine  [facility]

## 2023-01-21 NOTE — PROGRESS NOTES
Pt passed SBT and alert, NO ET tube cuff leak.      01/21/23 1519   Weaning Parameters   Spontaneous Breathing Trial Complete Yes   Resp Rate Observed 24   Ve 9.1      RSBI 59

## 2023-01-21 NOTE — PROGRESS NOTES
Interim ICU Progress Note    Patient passed SBT, but no cuff leak. Concern for laryngeal edema. Decadron 6mg q12h x2 doses ordered. Will reassess for cuff leak.     Pavel Barron NP

## 2023-01-21 NOTE — PROCEDURES
[facility][note 3]  EEG    Name:  Ev Vivar  MR#:  [id number]  :  1945  ACCOUNT #:  [de-identified]  DATE OF SERVICE:  2023      REQUESTING PHYSICIAN:  Daisy Flores NP    BRIEF HISTORY:  The patient is a 29-year-old female who is being evaluated after an episode of being found unresponsive. DESCRIPTION:  This is an 18-channel EEG performed on 2023 on a poorly responsive patient. There is no clear dominant background rhythm. Background activity consists of low-to-medium voltage rhythms ranging from delta to theta range out of the frontal areas. Intermittently, brief bursts of faster frequencies mixed with muscle artifact were seen in temporal regions. Drowsiness and sleep states were not recorded. Photic stimulation did not elicit driving response. Hyperventilation was not performed. EEG SUMMARY:  Abnormal EEG due to mild-to-moderate slowing of the background rhythms. CLINICAL INTERPRETATION:  This EEG is suggestive of a mild-to-moderate generalized encephalopathic process, nonspecific in type. This may be related to an underlying structural brain injury and/or toxic/metabolic abnormality. No lateralizing or epileptiform features were noted. No electrographic seizures were seen.       Adelina Ryan MD      [id number]  D:  2023 14:47  T:  2023 0:31  JOB #:  [id number]

## 2023-01-21 NOTE — PROCEDURES
[address] [facility]  EEG    Name:  Krystle Maldonado  MR#:  [id number]  :  1945  ACCOUNT #:  [de-identified]  DATE OF SERVICE:  2023      REQUESTING PHYSICIAN:  Radha Argueta DO    BRIEF HISTORY:  The patient is a 51-year-old female who is being evaluated after an episode of unresponsiveness. DESCRIPTION:  This is an 18-channel EEG performed on a poorly responsive patient. There is no clear dominant background rhythm. Background activity essentially consists of significant muscle artifact throughout the recording with readable portions appearing to be in the faster beta frequency range out of all head regions. Drowsiness and sleep states are not seen. Photic stimulation and hyperventilation were not performed. EEG SUMMARY:  Technically very limited study due to excessive muscle tension artifact. No convincingly lateralizing or epileptiform features seen. CLINICAL INTERPRETATION:  Overall, this was a limited study due to excessive artifact. No epileptiform disturbance or electrographic seizures were seen. A repeat EEG when the patient is more relaxed may be helpful.       Nia Tavera MD      [id number]  D:  2023 14:27  T:  2023 23:46  JOB #:  [id number]

## 2023-01-22 ENCOUNTER — APPOINTMENT (OUTPATIENT)
Dept: MRI IMAGING | Age: 78
DRG: 637 | End: 2023-01-22
Attending: NURSE PRACTITIONER
Payer: MEDICARE

## 2023-01-22 LAB
ANION GAP SERPL CALC-SCNC: 9 MMOL/L (ref 5–15)
BASOPHILS # BLD: 0 K/UL (ref 0–0.1)
BASOPHILS NFR BLD: 0 % (ref 0–1)
BUN SERPL-MCNC: 22 MG/DL (ref 6–20)
BUN/CREAT SERPL: 17 (ref 12–20)
CALCIUM SERPL-MCNC: 8.4 MG/DL (ref 8.5–10.1)
CHLORIDE SERPL-SCNC: 100 MMOL/L (ref 97–108)
CO2 SERPL-SCNC: 26 MMOL/L (ref 21–32)
CREAT SERPL-MCNC: 1.29 MG/DL (ref 0.55–1.02)
DIFFERENTIAL METHOD BLD: ABNORMAL
EOSINOPHIL # BLD: 0 K/UL (ref 0–0.4)
EOSINOPHIL NFR BLD: 0 % (ref 0–7)
ERYTHROCYTE [DISTWIDTH] IN BLOOD BY AUTOMATED COUNT: 12.6 % (ref 11.5–14.5)
GLUCOSE BLD STRIP.AUTO-MCNC: 217 MG/DL (ref 65–117)
GLUCOSE BLD STRIP.AUTO-MCNC: 223 MG/DL (ref 65–117)
GLUCOSE BLD STRIP.AUTO-MCNC: 320 MG/DL (ref 65–117)
GLUCOSE BLD STRIP.AUTO-MCNC: 336 MG/DL (ref 65–117)
GLUCOSE SERPL-MCNC: 343 MG/DL (ref 65–100)
HCT VFR BLD AUTO: 30.9 % (ref 35–47)
HGB BLD-MCNC: 10.1 G/DL (ref 11.5–16)
IMM GRANULOCYTES # BLD AUTO: 0.1 K/UL (ref 0–0.04)
IMM GRANULOCYTES NFR BLD AUTO: 1 % (ref 0–0.5)
LYMPHOCYTES # BLD: 0.6 K/UL (ref 0.8–3.5)
LYMPHOCYTES NFR BLD: 4 % (ref 12–49)
MAGNESIUM SERPL-MCNC: 2.1 MG/DL (ref 1.6–2.4)
MAGNESIUM SERPL-MCNC: 2.2 MG/DL (ref 1.6–2.4)
MCH RBC QN AUTO: 28.1 PG (ref 26–34)
MCHC RBC AUTO-ENTMCNC: 32.7 G/DL (ref 30–36.5)
MCV RBC AUTO: 85.8 FL (ref 80–99)
MONOCYTES # BLD: 1.1 K/UL (ref 0–1)
MONOCYTES NFR BLD: 8 % (ref 5–13)
NEUTS SEG # BLD: 12 K/UL (ref 1.8–8)
NEUTS SEG NFR BLD: 87 % (ref 32–75)
NRBC # BLD: 0 K/UL (ref 0–0.01)
NRBC BLD-RTO: 0 PER 100 WBC
PHOSPHATE SERPL-MCNC: 2.6 MG/DL (ref 2.6–4.7)
PHOSPHATE SERPL-MCNC: 2.6 MG/DL (ref 2.6–4.7)
PLATELET # BLD AUTO: 252 K/UL (ref 150–400)
PMV BLD AUTO: 10.7 FL (ref 8.9–12.9)
POTASSIUM SERPL-SCNC: 3.5 MMOL/L (ref 3.5–5.1)
PROCALCITONIN SERPL-MCNC: 1.31 NG/ML
RBC # BLD AUTO: 3.6 M/UL (ref 3.8–5.2)
RBC MORPH BLD: ABNORMAL
SERVICE CMNT-IMP: ABNORMAL
SODIUM SERPL-SCNC: 135 MMOL/L (ref 136–145)
WBC # BLD AUTO: 13.8 K/UL (ref 3.6–11)

## 2023-01-22 PROCEDURE — 74011250636 HC RX REV CODE- 250/636: Performed by: NURSE PRACTITIONER

## 2023-01-22 PROCEDURE — 84100 ASSAY OF PHOSPHORUS: CPT

## 2023-01-22 PROCEDURE — 83735 ASSAY OF MAGNESIUM: CPT

## 2023-01-22 PROCEDURE — 65610000006 HC RM INTENSIVE CARE

## 2023-01-22 PROCEDURE — 94640 AIRWAY INHALATION TREATMENT: CPT

## 2023-01-22 PROCEDURE — 70553 MRI BRAIN STEM W/O & W/DYE: CPT

## 2023-01-22 PROCEDURE — 74011000250 HC RX REV CODE- 250: Performed by: INTERNAL MEDICINE

## 2023-01-22 PROCEDURE — 94664 DEMO&/EVAL PT USE INHALER: CPT

## 2023-01-22 PROCEDURE — 94003 VENT MGMT INPAT SUBQ DAY: CPT

## 2023-01-22 PROCEDURE — 74011636637 HC RX REV CODE- 636/637: Performed by: NURSE PRACTITIONER

## 2023-01-22 PROCEDURE — 74011000250 HC RX REV CODE- 250: Performed by: NURSE PRACTITIONER

## 2023-01-22 PROCEDURE — 36415 COLL VENOUS BLD VENIPUNCTURE: CPT

## 2023-01-22 PROCEDURE — C9254 INJECTION, LACOSAMIDE: HCPCS | Performed by: NURSE PRACTITIONER

## 2023-01-22 PROCEDURE — 85025 COMPLETE CBC W/AUTO DIFF WBC: CPT

## 2023-01-22 PROCEDURE — 80048 BASIC METABOLIC PNL TOTAL CA: CPT

## 2023-01-22 PROCEDURE — A9576 INJ PROHANCE MULTIPACK: HCPCS

## 2023-01-22 PROCEDURE — 84145 PROCALCITONIN (PCT): CPT

## 2023-01-22 PROCEDURE — 51798 US URINE CAPACITY MEASURE: CPT

## 2023-01-22 PROCEDURE — 74011250637 HC RX REV CODE- 250/637: Performed by: NURSE PRACTITIONER

## 2023-01-22 PROCEDURE — 74011250636 HC RX REV CODE- 250/636

## 2023-01-22 PROCEDURE — 82962 GLUCOSE BLOOD TEST: CPT

## 2023-01-22 RX ORDER — INSULIN LISPRO 100 [IU]/ML
INJECTION, SOLUTION INTRAVENOUS; SUBCUTANEOUS
Status: DISCONTINUED | OUTPATIENT
Start: 2023-01-22 | End: 2023-01-23

## 2023-01-22 RX ORDER — GUAIFENESIN 100 MG/5ML
400 SOLUTION ORAL
Status: DISCONTINUED | OUTPATIENT
Start: 2023-01-22 | End: 2023-01-26 | Stop reason: HOSPADM

## 2023-01-22 RX ORDER — SODIUM CHLORIDE 0.9 % (FLUSH) 0.9 %
10 SYRINGE (ML) INJECTION
Status: COMPLETED | OUTPATIENT
Start: 2023-01-23 | End: 2023-01-22

## 2023-01-22 RX ADMIN — LEVETIRACETAM 750 MG: 100 INJECTION, SOLUTION, CONCENTRATE INTRAVENOUS at 23:57

## 2023-01-22 RX ADMIN — RACEPINEPHRINE HYDROCHLORIDE 0.5 ML: 11.25 SOLUTION RESPIRATORY (INHALATION) at 08:14

## 2023-01-22 RX ADMIN — HEPARIN SODIUM 5000 UNITS: 5000 INJECTION INTRAVENOUS; SUBCUTANEOUS at 08:34

## 2023-01-22 RX ADMIN — DEXAMETHASONE SODIUM PHOSPHATE 6 MG: 10 INJECTION INTRAMUSCULAR; INTRAVENOUS at 05:13

## 2023-01-22 RX ADMIN — INSULIN GLARGINE 22 UNITS: 100 INJECTION, SOLUTION SUBCUTANEOUS at 21:34

## 2023-01-22 RX ADMIN — POTASSIUM BICARBONATE 40 MEQ: 782 TABLET, EFFERVESCENT ORAL at 08:34

## 2023-01-22 RX ADMIN — NYSTATIN OINTMENT: 100000 OINTMENT TOPICAL at 08:54

## 2023-01-22 RX ADMIN — HEPARIN SODIUM 5000 UNITS: 5000 INJECTION INTRAVENOUS; SUBCUTANEOUS at 18:19

## 2023-01-22 RX ADMIN — SODIUM CHLORIDE, PRESERVATIVE FREE 10 ML: 5 INJECTION INTRAVENOUS at 21:34

## 2023-01-22 RX ADMIN — SODIUM CHLORIDE, PRESERVATIVE FREE 10 ML: 5 INJECTION INTRAVENOUS at 23:31

## 2023-01-22 RX ADMIN — POLYVINYL ALCOHOL, POVIDONE 1 DROP: .5; .6 LIQUID OPHTHALMIC at 00:19

## 2023-01-22 RX ADMIN — Medication 4 UNITS: at 14:09

## 2023-01-22 RX ADMIN — INSULIN GLARGINE 22 UNITS: 100 INJECTION, SOLUTION SUBCUTANEOUS at 08:34

## 2023-01-22 RX ADMIN — SODIUM CHLORIDE, PRESERVATIVE FREE 10 ML: 5 INJECTION INTRAVENOUS at 18:19

## 2023-01-22 RX ADMIN — INSULIN GLARGINE 8 UNITS: 100 INJECTION, SOLUTION SUBCUTANEOUS at 00:17

## 2023-01-22 RX ADMIN — LEVETIRACETAM 750 MG: 100 INJECTION, SOLUTION, CONCENTRATE INTRAVENOUS at 14:10

## 2023-01-22 RX ADMIN — Medication 10 UNITS: at 00:27

## 2023-01-22 RX ADMIN — Medication 2 UNITS: at 21:34

## 2023-01-22 RX ADMIN — SODIUM CHLORIDE, PRESERVATIVE FREE 40 ML: 5 INJECTION INTRAVENOUS at 05:22

## 2023-01-22 RX ADMIN — LEVOTHYROXINE SODIUM 100 MCG: 0.1 TABLET ORAL at 05:22

## 2023-01-22 RX ADMIN — FAMOTIDINE 20 MG: 20 TABLET, FILM COATED ORAL at 08:34

## 2023-01-22 RX ADMIN — LEVETIRACETAM 750 MG: 100 INJECTION, SOLUTION, CONCENTRATE INTRAVENOUS at 00:19

## 2023-01-22 RX ADMIN — LACOSAMIDE 50 MG: 10 INJECTION INTRAVENOUS at 00:17

## 2023-01-22 RX ADMIN — LACOSAMIDE 50 MG: 10 INJECTION INTRAVENOUS at 23:57

## 2023-01-22 RX ADMIN — NYSTATIN OINTMENT: 100000 OINTMENT TOPICAL at 18:19

## 2023-01-22 RX ADMIN — Medication 10 UNITS: at 05:20

## 2023-01-22 RX ADMIN — LACOSAMIDE 50 MG: 10 INJECTION INTRAVENOUS at 14:13

## 2023-01-22 RX ADMIN — AMLODIPINE BESYLATE 5 MG: 5 TABLET ORAL at 08:34

## 2023-01-22 RX ADMIN — GADOTERIDOL 20 ML: 279.3 INJECTION, SOLUTION INTRAVENOUS at 23:31

## 2023-01-22 RX ADMIN — HEPARIN SODIUM 5000 UNITS: 5000 INJECTION INTRAVENOUS; SUBCUTANEOUS at 00:17

## 2023-01-22 NOTE — PROGRESS NOTES
1930: Bedside and Verbal shift change report given to ARMINDA Patterson, RNC (oncoming nurse) by Kain Kevin RN (offgoing nurse). Report included the following information SBAR, Kardex, Intake/Output, MAR, Accordion, and Recent Results. 0730: Bedside and Verbal shift change report given to BOZENA Overton RN (oncoming nurse) by Kierra Patterson RNC (offgoing nurse). Report included the following information SBAR, Kardex, Intake/Output, MAR, Accordion, Recent Results, Cardiac Rhythm NSR, Alarm Parameters , and Dual Neuro Assessment. Patient ready for extubation, nodding appropriately and tolerated SBT all night.

## 2023-01-22 NOTE — PROGRESS NOTES
0730 Bedside and Verbal shift change [facility] by Milana Horton RN (offgoing nurse). Report included the following information SBAR, Kardex, ED Summary, Intake/Output, MAR, Recent Results, Cardiac Rhythm NSR-ST, Alarm Parameters , and Dual Neuro Assessment. 1930 Bedside and Verbal shift change report given to Andre Tavera RN (oncoming nurse) by [facility] (offgoing nurse). Report included the following information SBAR, Kardex, ED Summary, Intake/Output, MAR, Recent Results, Cardiac Rhythm NSR, Alarm Parameters , and Dual Neuro Assessment.

## 2023-01-22 NOTE — PROGRESS NOTES
Transition of Care Plan  RUR- Low    DISPOSITION:  pending medical progression  F/U with PCP/Specialist    Transport: [facility]/family   Patient remains in the ICU, new onset seizures. Will need therapy evaluations to assist with transitions of care.   Shamika aBll RN,Care Management

## 2023-01-22 NOTE — PROGRESS NOTES
0730 Bedside and Verbal shift change report given to 1 [facility]\"" (oncoming nurse) by Alejandra Silva RN (offgoing nurse). Report included the following information SBAR, Kardex, Intake/Output, MAR, Recent Results, Cardiac Rhythm NSR, Alarm Parameters , and Dual Neuro Assessment. 2900 Patient extubated to 4L NC by RT and RN with Rosa Rosenberg MD and Rosalba Desir NP at bedside. Tolerated well. VSS. Will continue to monitor. 1030 Eckert removed. 1400 Patient up in chair. Tolerated well. 1930 Bedside and Verbal shift change report given to Francisco Young RN(oncoming nurse) by 1 \[facility]\"" (offgoing nurse). Report included the following information SBAR, Kardex, Intake/Output, MAR, Recent Results, Cardiac Rhythm NSR, Alarm Parameters , and Dual Neuro Assessment.

## 2023-01-22 NOTE — PROGRESS NOTES
CRITICAL CARE NOTE      Name: Lesly Metcalf   : 1945   MRN: [id number]   Date: 2023      REASON FOR ICU ADMISSION:  Status Epilepticus     PRINCIPAL ICU DIAGNOSIS     Status epilepticus  New onset seizure  HHS, poorly controlled DM2  HTN  JORGE  Acute toxic metabolic encephalopathy  Morbid obesity    BRIEF PATIENT SUMMARY     Lesly Metcalf is a 78 y.o. female who has a PMH of DM2 and HTN and was found on the floor by her family with LKW at 0800. Family called EMS and the patient had a witnessed seizure with R sided gaze deviation that aborted with 2mg IV ativan. She was intubated for airway protection. Glucose was found to be 1157, LA 2.38. Corrected NA was 147. She loaded with 2g of keppra and transferred to [facility]. Upon arrival to [facility], she was intubated, not on sedation at the time of arrival and not requiring vasopressors. CT head unrevealing. MRI ordered, but vent compatible MRI machine has been unavailable. She was weaned off sedation and passed SBT on , but no cuff leak was present so she received IV decadron x2. She was successfully extubated on . COMPREHENSIVE ASSESSMENT & PLAN:SYSTEM BASED     24 HOUR EVENTS: Remained off sedation; Passed SBT no cuff leak on ; added steroids; SBT again this am ; successfully extubated     NEUROLOGICAL:     Seizure: Initially SE: New onset, no prior history of seizure: Possibly due to severe hyperglycemia/HHS.  CT head negative  - Treatment of HHS as below as this may have been the etiology  - No evidence of seizure activity at this time  - Keppra  - Vimpat  - MRI when able  - Neurology following    Toxic metabolic encephalopathy: Due to Seizure/HHS/Delirium  - Treatment of underlying conditions  - MRI ordered  - Hold precedex to eval neuro status     PULMONOLOGY:     Extubated on   - Closely monitor for signs of respiratory insufficiency following extubation    CARDIOVASCULAR:     HTN: (1046 addendum)  - Added amlodipine  - Lasix for volume overload which will also help with HTN  - PRN hydralazine for SBP >160    GASTROINTESTINAL      Nutrition:   - TF  - NST following  - H2 for SUP ppx  - SLP to see patient    RENAL/ELECTROLYTE/FLUIDS:     JORGE: likely due to prerenal state, improving  - Aggressive fluid resuscitation    ENDOCRINE:     Poorly controlled DM2: Initially HHS: A1c >14  - Lantus 36 HS  - SSI    HEMATOLOGY/ONCOLOGY:     Anemia, likely due to acute illness: No evidence of current bleeding  - Transfuse to keep Hgb >7  - Daily CBC    INFECTIOUS DISEASE:     Leukocytosis: Likely reactive given steroid administration  - Procal elevated today, but difficult to interpret in the setting of JORGE  - Reordered for tomorrow. Given the patient's overall clinical improvement and no other sign of infection, will trend this and leukocytosis tomorrow. If sign of infection develops, will order abx    ANTIBIOTICS TO DATE: None    CULTURES TO DATE:  Blood cultures 1/21: NGTD      ICU DAILY CHECKLIST     Code Status:F  DVT Prophylaxis:heparin SC  T/L/D: Tubes: PIVs  Lines: PIVs  Drains: N  SUP: Y  Diet: TF  Activity Level:as bobby  ABCDEF Bundle/Checklist Completed:Yes  Disposition: ICU  Multidisciplinary Rounds Completed:  Y  Goals of Care Discussion/Palliative: Y  Patient/Family Updated: [address]     As per HPI    SUBJECTIVE   ROS  Unable to obtain    OBJECTIVE     Labs and Data: Reviewed 01/22/23  Medications: Reviewed 01/22/23  Imaging: Reviewed 01/22/23    Physical Exam  Vitals and nursing note reviewed. Constitutional:       Appearance: She is ill-appearing. HENT:      Head: Normocephalic. Nose: Nose normal.      Mouth/Throat:      Mouth: Mucous membranes are moist.   Eyes:      Pupils: Pupils are equal, round, and reactive to light. Cardiovascular:      Rate and Rhythm: Normal rate and regular rhythm. Pulses: Normal pulses. Pulmonary:      Breath sounds: Normal breath sounds.       Comments: Diminished bases  Abdominal:      General: Bowel sounds are normal.      Palpations: Abdomen is soft. Comments: Obese   Musculoskeletal:      Comments: No edema   Skin:     General: Skin is warm and dry. Capillary Refill: Capillary refill takes less than 2 seconds. Neurological:      Comments: Oriented x2; soft voice; follows simple commands; L>R strength in upper extremities; BLE equal strength   Psychiatric:      Comments: Calm, cooperative        Visit Vitals  BP (!) 144/84   Pulse (!) 106   Temp 97.9 °F (36.6 °C)   Resp 26   Ht 5' 3\" (1.6 m)   Wt 89.1 kg (196 lb 6.9 oz)   SpO2 100%   BMI 34.80 kg/m²      O2 Device: Endotracheal tube, Ventilator Temp (24hrs), Av.7 °F (37.1 °C), Min:97.9 °F (36.6 °C), Max:100 °F (37.8 °C)           Intake/Output:     Intake/Output Summary (Last 24 hours) at 2023 6702  Last data filed at 2023 0700  Gross per 24 hour   Intake 1062.45 ml   Output 2225 ml   Net -1162.55 ml         Imaging           CRITICAL CARE DOCUMENTATION  I had a face to face encounter with the patient, reviewed and interpreted patient data including clinical events, labs, images, vital signs, I/O's, and examined patient. I have discussed the case and the plan and management of the patient's care with the consulting services, the bedside nurses and the respiratory therapist.      NOTE OF PERSONAL INVOLVEMENT IN CARE   This patient has a high probability of imminent, clinically significant deterioration, which requires the highest level of preparedness to intervene urgently. I participated in the decision-making and personally managed or directed the management of the following life and organ supporting interventions that required my frequent assessment to treat or prevent imminent deterioration. I personally spent 45 minutes of critical care time. This is time spent at this critically ill patient's bedside actively involved in patient care as well as the coordination of care.   This does not include any procedural time which has been billed separately.     ACP Updated family 1/21    Darien Spears NP   Critical Care Medicine  [facility] [place][note 2]

## 2023-01-22 NOTE — PROGRESS NOTES
Pt passed SBT and follow command   01/22/23 0722   Weaning Parameters   Spontaneous Breathing Trial Complete Yes   Resp Rate Observed 26   Ve 10.4      RSBI 46

## 2023-01-23 LAB
ANION GAP SERPL CALC-SCNC: 3 MMOL/L (ref 5–15)
BASOPHILS # BLD: 0 K/UL (ref 0–0.1)
BASOPHILS NFR BLD: 0 % (ref 0–1)
BUN SERPL-MCNC: 20 MG/DL (ref 6–20)
BUN/CREAT SERPL: 17 (ref 12–20)
CALCIUM SERPL-MCNC: 9.2 MG/DL (ref 8.5–10.1)
CHLORIDE SERPL-SCNC: 101 MMOL/L (ref 97–108)
CO2 SERPL-SCNC: 30 MMOL/L (ref 21–32)
CREAT SERPL-MCNC: 1.2 MG/DL (ref 0.55–1.02)
DIFFERENTIAL METHOD BLD: ABNORMAL
EOSINOPHIL # BLD: 0 K/UL (ref 0–0.4)
EOSINOPHIL NFR BLD: 0 % (ref 0–7)
ERYTHROCYTE [DISTWIDTH] IN BLOOD BY AUTOMATED COUNT: 12.8 % (ref 11.5–14.5)
GLUCOSE BLD STRIP.AUTO-MCNC: 129 MG/DL (ref 65–117)
GLUCOSE BLD STRIP.AUTO-MCNC: 138 MG/DL (ref 65–117)
GLUCOSE BLD STRIP.AUTO-MCNC: 199 MG/DL (ref 65–117)
GLUCOSE BLD STRIP.AUTO-MCNC: 256 MG/DL (ref 65–117)
GLUCOSE SERPL-MCNC: 158 MG/DL (ref 65–100)
HCT VFR BLD AUTO: 32.8 % (ref 35–47)
HGB BLD-MCNC: 10.5 G/DL (ref 11.5–16)
IMM GRANULOCYTES # BLD AUTO: 0.2 K/UL (ref 0–0.04)
IMM GRANULOCYTES NFR BLD AUTO: 1 % (ref 0–0.5)
LYMPHOCYTES # BLD: 1.9 K/UL (ref 0.8–3.5)
LYMPHOCYTES NFR BLD: 12 % (ref 12–49)
MAGNESIUM SERPL-MCNC: 2.3 MG/DL (ref 1.6–2.4)
MCH RBC QN AUTO: 28.2 PG (ref 26–34)
MCHC RBC AUTO-ENTMCNC: 32 G/DL (ref 30–36.5)
MCV RBC AUTO: 87.9 FL (ref 80–99)
MONOCYTES # BLD: 1.6 K/UL (ref 0–1)
MONOCYTES NFR BLD: 10 % (ref 5–13)
NEUTS SEG # BLD: 11.9 K/UL (ref 1.8–8)
NEUTS SEG NFR BLD: 77 % (ref 32–75)
NRBC # BLD: 0 K/UL (ref 0–0.01)
NRBC BLD-RTO: 0 PER 100 WBC
PHOSPHATE SERPL-MCNC: 2.4 MG/DL (ref 2.6–4.7)
PLATELET # BLD AUTO: 274 K/UL (ref 150–400)
PMV BLD AUTO: 10.7 FL (ref 8.9–12.9)
POTASSIUM SERPL-SCNC: 4.1 MMOL/L (ref 3.5–5.1)
PROCALCITONIN SERPL-MCNC: 1.1 NG/ML
RBC # BLD AUTO: 3.73 M/UL (ref 3.8–5.2)
SERVICE CMNT-IMP: ABNORMAL
SODIUM SERPL-SCNC: 134 MMOL/L (ref 136–145)
WBC # BLD AUTO: 15.6 K/UL (ref 3.6–11)

## 2023-01-23 PROCEDURE — 74011000250 HC RX REV CODE- 250: Performed by: NURSE PRACTITIONER

## 2023-01-23 PROCEDURE — 74011636637 HC RX REV CODE- 636/637: Performed by: CLINICAL NURSE SPECIALIST

## 2023-01-23 PROCEDURE — 97165 OT EVAL LOW COMPLEX 30 MIN: CPT

## 2023-01-23 PROCEDURE — 65270000046 HC RM TELEMETRY

## 2023-01-23 PROCEDURE — 74011250637 HC RX REV CODE- 250/637: Performed by: NURSE PRACTITIONER

## 2023-01-23 PROCEDURE — 84100 ASSAY OF PHOSPHORUS: CPT

## 2023-01-23 PROCEDURE — 74011000258 HC RX REV CODE- 258: Performed by: NURSE PRACTITIONER

## 2023-01-23 PROCEDURE — 51798 US URINE CAPACITY MEASURE: CPT

## 2023-01-23 PROCEDURE — 74011000250 HC RX REV CODE- 250: Performed by: INTERNAL MEDICINE

## 2023-01-23 PROCEDURE — 97161 PT EVAL LOW COMPLEX 20 MIN: CPT

## 2023-01-23 PROCEDURE — 97535 SELF CARE MNGMENT TRAINING: CPT

## 2023-01-23 PROCEDURE — 99232 SBSQ HOSP IP/OBS MODERATE 35: CPT | Performed by: PSYCHIATRY & NEUROLOGY

## 2023-01-23 PROCEDURE — 36415 COLL VENOUS BLD VENIPUNCTURE: CPT

## 2023-01-23 PROCEDURE — 74011250636 HC RX REV CODE- 250/636: Performed by: NURSE PRACTITIONER

## 2023-01-23 PROCEDURE — 99233 SBSQ HOSP IP/OBS HIGH 50: CPT | Performed by: CLINICAL NURSE SPECIALIST

## 2023-01-23 PROCEDURE — C9254 INJECTION, LACOSAMIDE: HCPCS | Performed by: NURSE PRACTITIONER

## 2023-01-23 PROCEDURE — 80048 BASIC METABOLIC PNL TOTAL CA: CPT

## 2023-01-23 PROCEDURE — 82962 GLUCOSE BLOOD TEST: CPT

## 2023-01-23 PROCEDURE — 83735 ASSAY OF MAGNESIUM: CPT

## 2023-01-23 PROCEDURE — 74011250637 HC RX REV CODE- 250/637: Performed by: PSYCHIATRY & NEUROLOGY

## 2023-01-23 PROCEDURE — 97530 THERAPEUTIC ACTIVITIES: CPT

## 2023-01-23 PROCEDURE — 94640 AIRWAY INHALATION TREATMENT: CPT

## 2023-01-23 PROCEDURE — 85025 COMPLETE CBC W/AUTO DIFF WBC: CPT

## 2023-01-23 PROCEDURE — 84145 PROCALCITONIN (PCT): CPT

## 2023-01-23 RX ORDER — LACOSAMIDE 10 MG/ML
25 INJECTION, SOLUTION INTRAVENOUS EVERY 12 HOURS
Status: DISCONTINUED | OUTPATIENT
Start: 2023-01-24 | End: 2023-01-23

## 2023-01-23 RX ORDER — INSULIN LISPRO 100 [IU]/ML
0.05 INJECTION, SOLUTION INTRAVENOUS; SUBCUTANEOUS
Status: DISCONTINUED | OUTPATIENT
Start: 2023-01-23 | End: 2023-01-26 | Stop reason: HOSPADM

## 2023-01-23 RX ORDER — IPRATROPIUM BROMIDE AND ALBUTEROL SULFATE 2.5; .5 MG/3ML; MG/3ML
3 SOLUTION RESPIRATORY (INHALATION)
Status: DISCONTINUED | OUTPATIENT
Start: 2023-01-23 | End: 2023-01-25 | Stop reason: SDUPTHER

## 2023-01-23 RX ORDER — INSULIN GLARGINE 100 [IU]/ML
0.4 INJECTION, SOLUTION SUBCUTANEOUS
Status: DISCONTINUED | OUTPATIENT
Start: 2023-01-23 | End: 2023-01-24

## 2023-01-23 RX ORDER — CARVEDILOL 6.25 MG/1
6.25 TABLET ORAL 2 TIMES DAILY WITH MEALS
Status: DISCONTINUED | OUTPATIENT
Start: 2023-01-23 | End: 2023-01-26 | Stop reason: HOSPADM

## 2023-01-23 RX ORDER — LACOSAMIDE 50 MG/1
25 TABLET ORAL EVERY 12 HOURS
Status: COMPLETED | OUTPATIENT
Start: 2023-01-23 | End: 2023-01-26

## 2023-01-23 RX ORDER — INSULIN LISPRO 100 [IU]/ML
INJECTION, SOLUTION INTRAVENOUS; SUBCUTANEOUS
Status: DISCONTINUED | OUTPATIENT
Start: 2023-01-23 | End: 2023-01-26 | Stop reason: HOSPADM

## 2023-01-23 RX ADMIN — HEPARIN SODIUM 5000 UNITS: 5000 INJECTION INTRAVENOUS; SUBCUTANEOUS at 17:59

## 2023-01-23 RX ADMIN — SODIUM PHOSPHATE, MONOBASIC, MONOHYDRATE AND SODIUM PHOSPHATE, DIBASIC, ANHYDROUS: 276; 142 INJECTION, SOLUTION INTRAVENOUS at 08:49

## 2023-01-23 RX ADMIN — INSULIN GLARGINE 36 UNITS: 100 INJECTION, SOLUTION SUBCUTANEOUS at 21:49

## 2023-01-23 RX ADMIN — HEPARIN SODIUM 5000 UNITS: 5000 INJECTION INTRAVENOUS; SUBCUTANEOUS at 00:00

## 2023-01-23 RX ADMIN — NYSTATIN OINTMENT: 100000 OINTMENT TOPICAL at 17:59

## 2023-01-23 RX ADMIN — NYSTATIN OINTMENT: 100000 OINTMENT TOPICAL at 08:49

## 2023-01-23 RX ADMIN — IPRATROPIUM BROMIDE AND ALBUTEROL SULFATE 3 ML: 2.5; .5 SOLUTION RESPIRATORY (INHALATION) at 20:38

## 2023-01-23 RX ADMIN — SODIUM CHLORIDE, PRESERVATIVE FREE 10 ML: 5 INJECTION INTRAVENOUS at 21:49

## 2023-01-23 RX ADMIN — Medication 2 UNITS: at 18:00

## 2023-01-23 RX ADMIN — AMLODIPINE BESYLATE 5 MG: 5 TABLET ORAL at 08:49

## 2023-01-23 RX ADMIN — Medication 6 UNITS: at 11:32

## 2023-01-23 RX ADMIN — LEVETIRACETAM 750 MG: 500 TABLET, FILM COATED ORAL at 21:09

## 2023-01-23 RX ADMIN — GUAIFENESIN 400 MG: 100 SOLUTION ORAL at 00:54

## 2023-01-23 RX ADMIN — Medication 4 UNITS: at 17:59

## 2023-01-23 RX ADMIN — CARVEDILOL 6.25 MG: 6.25 TABLET, FILM COATED ORAL at 17:58

## 2023-01-23 RX ADMIN — Medication 4 UNITS: at 12:41

## 2023-01-23 RX ADMIN — HEPARIN SODIUM 5000 UNITS: 5000 INJECTION INTRAVENOUS; SUBCUTANEOUS at 08:49

## 2023-01-23 RX ADMIN — LACOSAMIDE 25 MG: 50 TABLET, FILM COATED ORAL at 21:10

## 2023-01-23 RX ADMIN — LEVOTHYROXINE SODIUM 100 MCG: 0.1 TABLET ORAL at 06:08

## 2023-01-23 RX ADMIN — LEVETIRACETAM 750 MG: 500 TABLET, FILM COATED ORAL at 11:32

## 2023-01-23 RX ADMIN — SODIUM CHLORIDE, PRESERVATIVE FREE 10 ML: 5 INJECTION INTRAVENOUS at 18:00

## 2023-01-23 RX ADMIN — GUAIFENESIN 400 MG: 100 SOLUTION ORAL at 23:11

## 2023-01-23 RX ADMIN — IPRATROPIUM BROMIDE AND ALBUTEROL SULFATE 3 ML: 2.5; .5 SOLUTION RESPIRATORY (INHALATION) at 09:11

## 2023-01-23 RX ADMIN — Medication 5 UNITS: at 12:40

## 2023-01-23 RX ADMIN — LACOSAMIDE 50 MG: 10 INJECTION INTRAVENOUS at 11:32

## 2023-01-23 RX ADMIN — SODIUM CHLORIDE, PRESERVATIVE FREE 10 ML: 5 INJECTION INTRAVENOUS at 06:08

## 2023-01-23 NOTE — H&P
[id number] [facility] Adult  Hospitalist Group  Consult to Downgrade to Medical Floor/Hospitalist History and Physical    Date of Service:  1/23/2023  Primary Care Provider: Tere Chopra MD  Source of information: The patient and Chart review    Chief Complaint: Seizure    History of Presenting Illness:   Samira Monroe is a 78 y.o. femaleintially presented to outside hospital due to mental status. Last know well time had been ~ 0800 am. Pt was found unresponsive at home with no evidence of trauma. + tremors and gaze was to the right. Glucose was 1157 lactic acid was 2.38. Loaded with keppra,  intubated and transferred to [facility] for further care 1/19. Patient is not a very good historian, unable to provide much history leading up to current events. She reports she stopped taking her diabetes medicine because her doctor gave her medicines that were making her blood sugar \"too high\". She admits to taking her blood sugar the morning of her seizure event and says it was ~1100. When asked about other blood sugars, she reports she does not know. She is unable to recount when the last time is that she went to her physician and again, states that he was giving her inappropriate medications. Hospitalist were consulted for medical management and to evaluate for downgrade to the medical floor. Patient was seen and examined, she was lying in bed in no acute distress, reports she has a mildly sore throat Denies headaches, dizziness, chest pain, chest pressure, shortness of breath. Denies any GI complaints such as nausea, vomiting, diarrhea or constipation and has no dysuria. Patient looks medically stable for downgrade to medical floor. Reports that she has had COVID vaccinations in addition to boosters  Reports she has had the flu vaccine     REVIEW OF SYSTEMS:  A comprehensive review of systems was negative except for that written in the History of Present Illness.      No past medical history on file.   No past surgical history on file. Prior to Admission medications    Medication Sig Start Date End Date Taking? Authorizing Provider   acetaminophen (TylenoL) 325 mg tablet Take 500 mg by mouth every four (4) hours as needed for Pain. Yes Provider, Historical   losartan (COZAAR) 100 mg tablet Take 100 mg by mouth daily. Indications: high blood pressure   Yes Provider, Historical   atorvastatin (LIPITOR) 40 mg tablet Take 40 mg by mouth daily. Yes Provider, Historical   metFORMIN (GLUCOPHAGE) 850 mg tablet Take 850 mg by mouth two (2) times daily (with meals). Yes Provider, Historical   levothyroxine (SYNTHROID) 100 mcg tablet Take 125 mcg by mouth Daily (before breakfast). Indications: a condition with low thyroid hormone levels   Yes Provider, Historical   furosemide (LASIX) 40 mg tablet Take 40 mg by mouth daily. Yes Provider, Historical   carvediloL (COREG) 12.5 mg tablet Take 12.5 mg by mouth once over twenty-four (24) hours. Yes Provider, Historical     No Known Allergies     Reports she is unsure about her family history and chronic illness    Family and social history were personally reviewed, all pertinent and relevant details are outlined as above. Objective:   Visit Vitals  /67 (BP 1 Location: Right upper arm, BP Patient Position: At rest)   Pulse 86   Temp 98 °F (36.7 °C)   Resp 18   Ht 5' 3\" (1.6 m)   Wt 89.1 kg (196 lb 6.9 oz)   SpO2 96%   BMI 34.80 kg/m²    O2 Flow Rate (L/min): 4 l/min O2 Device: None (Room air)    PHYSICAL EXAM:     Blood pressure: 129/67 heart rate 91 oxygen sats 98% on room air  General: Alert x oriented x 3, awake, no acute distress, resting in bed, pleasant social determinants of health female, appears to be stated age  HEENT: EOMI, MMM  Neck: Trachea midline  Chest: Clear to auscultation bilaterally   CVS: RRR, no murmur. Heart rate 91. Abd: Soft, non-tender, non-distended, +bowel sounds.   Reports appetite is good  Ext: No edema  Neuro/Psych: Pleasant mood and affect, sensory grossly within normal limit, Strength 5/5 in all extremities  Pulses: 2+  Skin: Warm, dry, without rashes or lesions    Data Review: All diagnostic labs and studies have been reviewed. Abnormal Labs Reviewed   CBC W/O DIFF - Abnormal; Notable for the following components:       Result Value    HGB 11.0 (*)     HCT 33.7 (*)     All other components within normal limits   METABOLIC PANEL, COMPREHENSIVE - Abnormal; Notable for the following components:    Sodium 132 (*)     Chloride 94 (*)     Glucose 792 (*)     BUN 29 (*)     Creatinine 2.00 (*)     eGFR 26 (*)     ALT (SGPT) 298 (*)     AST (SGOT) 586 (*)     Alk.  phosphatase 480 (*)     Albumin 3.3 (*)     A-G Ratio 1.0 (*)     All other components within normal limits   LACTIC ACID - Abnormal; Notable for the following components:    Lactic acid 3.8 (*)     All other components within normal limits   URINALYSIS W/MICROSCOPIC - Abnormal; Notable for the following components:    Glucose >1,000 (*)     Blood TRACE (*)     All other components within normal limits   BLOOD GAS, ARTERIAL - Abnormal; Notable for the following components:    PO2 111 (*)     O2 SAT 98 (*)     All other components within normal limits   METABOLIC PANEL, BASIC - Abnormal; Notable for the following components:    Sodium 135 (*)     Potassium 3.1 (*)     Glucose 381 (*)     BUN 26 (*)     Creatinine 2.05 (*)     eGFR 25 (*)     All other components within normal limits   PHOSPHORUS - Abnormal; Notable for the following components:    Phosphorus 2.0 (*)     All other components within normal limits   METABOLIC PANEL, BASIC - Abnormal; Notable for the following components:    Potassium 3.4 (*)     Glucose 140 (*)     BUN 23 (*)     Creatinine 1.55 (*)     eGFR 35 (*)     Calcium 8.3 (*)     All other components within normal limits   CBC W/O DIFF - Abnormal; Notable for the following components:    RBC 3.43 (*)     HGB 9.6 (*)     HCT 29.3 (*)     All other components within normal limits   METABOLIC PANEL, BASIC - Abnormal; Notable for the following components:    Glucose 207 (*)     BUN 22 (*)     Creatinine 1.45 (*)     eGFR 38 (*)     All other components within normal limits   PHOSPHORUS - Abnormal; Notable for the following components:    Phosphorus 1.7 (*)     All other components within normal limits   TSH 3RD GENERATION - Abnormal; Notable for the following components:    TSH 4.57 (*)     All other components within normal limits   METABOLIC PANEL, BASIC - Abnormal; Notable for the following components:    Potassium 3.4 (*)     Glucose 198 (*)     Creatinine 1.26 (*)     eGFR 45 (*)     All other components within normal limits   METABOLIC PANEL, BASIC - Abnormal; Notable for the following components:    Sodium 134 (*)     Anion gap 2 (*)     Glucose 158 (*)     Creatinine 1.31 (*)     eGFR 42 (*)     All other components within normal limits   METABOLIC PANEL, BASIC - Abnormal; Notable for the following components:    Potassium 3.4 (*)     Glucose 222 (*)     Creatinine 1.21 (*)     eGFR 47 (*)     Calcium 8.0 (*)     All other components within normal limits   HEMOGLOBIN A1C WITH EAG - Abnormal; Notable for the following components:    Hemoglobin A1c >14.0 (*)     All other components within normal limits   METABOLIC PANEL, BASIC - Abnormal; Notable for the following components:    Potassium 3.3 (*)     Glucose 178 (*)     Creatinine 1.22 (*)     eGFR 46 (*)     Calcium 8.1 (*)     All other components within normal limits   MAGNESIUM - Abnormal; Notable for the following components:    Magnesium 2.5 (*)     All other components within normal limits   PHOSPHORUS - Abnormal; Notable for the following components:    Phosphorus 1.5 (*)     All other components within normal limits   CBC WITH AUTOMATED DIFF - Abnormal; Notable for the following components:    RBC 3.11 (*)     HGB 8.8 (*)     HCT 27.3 (*)     NEUTROPHILS 79 (*)     IMMATURE GRANULOCYTES 1 (*) ABS. IMM. GRANS. 0.1 (*)     All other components within normal limits   METABOLIC PANEL, BASIC - Abnormal; Notable for the following components:    Glucose 180 (*)     Creatinine 1.16 (*)     eGFR 49 (*)     Calcium 8.0 (*)     All other components within normal limits   POC G3 - PUL - Abnormal; Notable for the following components:    pH (POC) 7.50 (*)     pCO2 (POC) 30.2 (*)     sO2 (POC) 97.5 (*)     All other components within normal limits   CBC WITH AUTOMATED DIFF - Abnormal; Notable for the following components:    WBC 13.8 (*)     RBC 3.60 (*)     HGB 10.1 (*)     HCT 30.9 (*)     NEUTROPHILS 87 (*)     LYMPHOCYTES 4 (*)     IMMATURE GRANULOCYTES 1 (*)     ABS. NEUTROPHILS 12.0 (*)     ABS. LYMPHOCYTES 0.6 (*)     ABS. MONOCYTES 1.1 (*)     ABS. IMM. GRANS. 0.1 (*)     All other components within normal limits   METABOLIC PANEL, BASIC - Abnormal; Notable for the following components:    Sodium 135 (*)     Glucose 343 (*)     BUN 22 (*)     Creatinine 1.29 (*)     eGFR 43 (*)     Calcium 8.4 (*)     All other components within normal limits   PHOSPHORUS - Abnormal; Notable for the following components:    Phosphorus 2.4 (*)     All other components within normal limits   CBC WITH AUTOMATED DIFF - Abnormal; Notable for the following components:    WBC 15.6 (*)     RBC 3.73 (*)     HGB 10.5 (*)     HCT 32.8 (*)     NEUTROPHILS 77 (*)     IMMATURE GRANULOCYTES 1 (*)     ABS. NEUTROPHILS 11.9 (*)     ABS. MONOCYTES 1.6 (*)     ABS. IMM.  GRANS. 0.2 (*)     All other components within normal limits   METABOLIC PANEL, BASIC - Abnormal; Notable for the following components:    Sodium 134 (*)     Anion gap 3 (*)     Glucose 158 (*)     Creatinine 1.20 (*)     eGFR 47 (*)     All other components within normal limits   GLUCOSE, POC - Abnormal; Notable for the following components:    Glucose (POC) >600 (*)     All other components within normal limits   GLUCOSE, POC - Abnormal; Notable for the following components: Glucose (POC) >600 (*)     All other components within normal limits   GLUCOSE, POC - Abnormal; Notable for the following components:    Glucose (POC) >600 (*)     All other components within normal limits   GLUCOSE, POC - Abnormal; Notable for the following components:    Glucose (POC) >600 (*)     All other components within normal limits   GLUCOSE, POC - Abnormal; Notable for the following components:    Glucose (POC) 541 (*)     All other components within normal limits   GLUCOSE, POC - Abnormal; Notable for the following components:    Glucose (POC) 538 (*)     All other components within normal limits   GLUCOSE, POC - Abnormal; Notable for the following components:    Glucose (POC) 500 (*)     All other components within normal limits   GLUCOSE, POC - Abnormal; Notable for the following components:    Glucose (POC) 212 (*)     All other components within normal limits   GLUCOSE, POC - Abnormal; Notable for the following components:    Glucose (POC) 134 (*)     All other components within normal limits   GLUCOSE, POC - Abnormal; Notable for the following components:    Glucose (POC) 165 (*)     All other components within normal limits   GLUCOSE, POC - Abnormal; Notable for the following components:    Glucose (POC) 134 (*)     All other components within normal limits   GLUCOSE, POC - Abnormal; Notable for the following components:    Glucose (POC) 123 (*)     All other components within normal limits   GLUCOSE, POC - Abnormal; Notable for the following components:    Glucose (POC) 193 (*)     All other components within normal limits   GLUCOSE, POC - Abnormal; Notable for the following components:    Glucose (POC) 204 (*)     All other components within normal limits   GLUCOSE, POC - Abnormal; Notable for the following components:    Glucose (POC) 289 (*)     All other components within normal limits   GLUCOSE, POC - Abnormal; Notable for the following components:    Glucose (POC) 288 (*)     All other components within normal limits   GLUCOSE, POC - Abnormal; Notable for the following components:    Glucose (POC) 193 (*)     All other components within normal limits   GLUCOSE, POC - Abnormal; Notable for the following components:    Glucose (POC) 226 (*)     All other components within normal limits   GLUCOSE, POC - Abnormal; Notable for the following components:    Glucose (POC) 140 (*)     All other components within normal limits   GLUCOSE, POC - Abnormal; Notable for the following components:    Glucose (POC) 129 (*)     All other components within normal limits   GLUCOSE, POC - Abnormal; Notable for the following components:    Glucose (POC) 152 (*)     All other components within normal limits   GLUCOSE, POC - Abnormal; Notable for the following components:    Glucose (POC) 161 (*)     All other components within normal limits   GLUCOSE, POC - Abnormal; Notable for the following components:    Glucose (POC) 156 (*)     All other components within normal limits   GLUCOSE, POC - Abnormal; Notable for the following components:    Glucose (POC) 213 (*)     All other components within normal limits   GLUCOSE, POC - Abnormal; Notable for the following components:    Glucose (POC) 230 (*)     All other components within normal limits   GLUCOSE, POC - Abnormal; Notable for the following components:    Glucose (POC) 213 (*)     All other components within normal limits   GLUCOSE, POC - Abnormal; Notable for the following components:    Glucose (POC) 258 (*)     All other components within normal limits   GLUCOSE, POC - Abnormal; Notable for the following components:    Glucose (POC) 211 (*)     All other components within normal limits   GLUCOSE, POC - Abnormal; Notable for the following components:    Glucose (POC) 145 (*)     All other components within normal limits   GLUCOSE, POC - Abnormal; Notable for the following components:    Glucose (POC) 131 (*)     All other components within normal limits   GLUCOSE, POC - Abnormal; Notable for the following components:    Glucose (POC) 129 (*)     All other components within normal limits   GLUCOSE, POC - Abnormal; Notable for the following components:    Glucose (POC) 154 (*)     All other components within normal limits   GLUCOSE, POC - Abnormal; Notable for the following components:    Glucose (POC) 168 (*)     All other components within normal limits   GLUCOSE, POC - Abnormal; Notable for the following components:    Glucose (POC) 166 (*)     All other components within normal limits   GLUCOSE, POC - Abnormal; Notable for the following components:    Glucose (POC) 220 (*)     All other components within normal limits   GLUCOSE, POC - Abnormal; Notable for the following components:    Glucose (POC) 381 (*)     All other components within normal limits   GLUCOSE, POC - Abnormal; Notable for the following components:    Glucose (POC) 320 (*)     All other components within normal limits   GLUCOSE, POC - Abnormal; Notable for the following components:    Glucose (POC) 336 (*)     All other components within normal limits   GLUCOSE, POC - Abnormal; Notable for the following components:    Glucose (POC) 217 (*)     All other components within normal limits   GLUCOSE, POC - Abnormal; Notable for the following components:    Glucose (POC) 223 (*)     All other components within normal limits   GLUCOSE, POC - Abnormal; Notable for the following components:    Glucose (POC) 138 (*)     All other components within normal limits   GLUCOSE, POC - Abnormal; Notable for the following components:    Glucose (POC) 256 (*)     All other components within normal limits     All Micro Results       Procedure Component Value Units Date/Time    CULTURE, BLOOD [[id number]] Collected: 01/21/23 0039    Order Status: Completed Specimen: Blood Updated: 01/23/23 0537     Special Requests: NO SPECIAL REQUESTS        Culture result: NO GROWTH 2 DAYS       CULTURE, BLOOD, PAIRED [[id number]] Collected: 01/20/23 2221    Order Status: Canceled Specimen: Blood           IMAGING:   MRI BRAIN W WO CONT   Final Result      No acute intracranial process. Moderate chronic microvascular skinny change and mild cerebral atrophy. No intracranial mass, hemorrhage or evidence of acute infarction. XR CHEST PORT   Final Result      Intubated. Left pleural effusion and left lower lobe volume loss with some   improvement         XR ABD (KUB)   Final Result   Non-obstructive bowel gas pattern. . Gastric tube as described. Bibasilar atelectasis. XR CHEST PORT   Final Result   Recommend retracting ET tube approximately 3 cm. ECG/ECHO:    Results for orders placed or performed during the hospital encounter of 01/19/23   EKG, 12 LEAD, INITIAL   Result Value Ref Range    Ventricular Rate 109 BPM    Atrial Rate 109 BPM    P-R Interval 156 ms    QRS Duration 88 ms    Q-T Interval 366 ms    QTC Calculation (Bezet) 492 ms    Calculated P Axis 27 degrees    Calculated R Axis 7 degrees    Calculated T Axis 105 degrees    Diagnosis       Sinus tachycardia  Possible Left atrial enlargement  Left ventricular hypertrophy  Inferior infarct , age undetermined  Abnormal ECG  When compared with ECG of 19-JAN-2023 11:59, (Unconfirmed)  Inferior infarct is now Present  ST no longer elevated in Inferior leads  Confirmed by Dhruv Renteria ([id number]) on 1/20/2023 12:23:04 PM        Assessment/Plan:     Seizure:   - New onset, no prior history of seizure: Possibly due to severe hyperglycemia/HHS. - CT head negative  - Keppra  - Neurology has been consulted  - EEG showed periodic appearing blunted sharp wave activity on the right, indicating a potential seizure focus but no electrographic or clinical seizures were noted. - MRI shows no acute intracranial process. Moderate chronic microvascular change and mild cerebral atrophy. No intracranial mass, hemorrhage or evidence of acute infarction.      HHS:   - Occurring in the setting of poorly controlled DM2:   - DKA protocol insulin drip, has been transitioned to Lantus now with sliding scale insulin. - DM education has been ordered  - Hemoglobin A1c >14     Toxic metabolic encephalopathy:   - Due to Seizure/HHS/Delirium  - Resolving     Pseudohyponatremia:   - Corrected  when adjusted for glucose  - Continue to trend BMP     JORGE:   - likely due to prerenal state, resolving  - Received aggressive fluid resuscitation    DIET: ADULT DIET Regular; 3 carb choices (45 gm/meal)   ISOLATION PRECAUTIONS: There are currently no Active Isolations  CODE STATUS: Full Code   DVT PROPHYLAXIS: Heparin  FUNCTIONAL STATUS PRIOR TO HOSPITALIZATION: Fully active and ambulatory; able to carry on all self-care without restriction. Ambulatory status/function: By self   EARLY MOBILITY ASSESSMENT: Recommend an assessment from physical therapy and/or occupational therapy  ANTICIPATED DISCHARGE: 24-48 hours. ANTICIPATED DISPOSITION: Home  EMERGENCY CONTACT/SURROGATE DECISION MAKER: self. CRITICAL CARE WAS PERFORMED FOR THIS ENCOUNTER: NO.    Signed By: Maria Archibald NP     January 23, 2023       Please note that this dictation may have been completed with Dragon, the [employer] voice recognition software. Quite often unanticipated grammatical, syntax, homophones, and other interpretive errors are inadvertently transcribed by the computer software. Please disregard these errors. Please excuse any errors that have escaped final proofreading.

## 2023-01-23 NOTE — DIABETES MGMT
[facility]  [facility]  DIABETES MANAGEMENT CONSULT    Consulted by  Aure Abel NP  for advanced nursing evaluation and care for inpatient blood glucose management. Evaluation and Action Plan   Dioni Prajapati is a 78year old female with Type 2 Diabetes who is admitted with HHS with coma and status epilepticus. She was intubated for airway protection and started on an insulin gtt. BG on admission was 1157, AG 12, urine with negative ketones. HHS was resolved and was transitioned off the insulin gtt with high dose Lantus Q12 and correctional humalog. Decadron 6mg x2 was given over the weekend for laryngeal edema, now off the [facility] and successfully extubated. BG this morning is 138. Given steroids out of system and eating, we will wean basal as fasting below goal and add bolus insulin. A1C is greater than 14% and patient tells me that she stopped her metformin due to side effects but never followed up with outpatient provider. She will need both inpatient and outpatient support for diabetes care. Inpatient BG goal is 140-180mg/dl. Management Rationale Action Plan   Medication   Basal needs Using 0.4 units/kg/D as fasting BG   below goal, elevated A1C Please give 6 units NPH x1 (as Lantus 22 units on board)     Then reduce total basal to 36 units Lantus HS   Bolus humalog Bolus humalog as eating. 4 units Humalog/meal   Corrective insulin Using normal sensitivity Normal sensitivity ACHS   Additional orders    Continue carb consistent diet, can adjust to 60g CHO/meal    Please encourage patient to participate in diabetes self care while in the hospital including allowing patient to   use lancet for blood glucose monitoring and self-administer insulin injections. Initial Presentation   Dioni Prajapati is a 78 y.o. female who was transferred from [facility] after being found down and unresponsive with tremors at home by family on 1/19/23.  On arrival to the ED, she was seizing, ativan was given and intubated for airway protection. In the ED< her BP was 226/170, . LAB: UA: 1000+ glucosuria, , BG 1217, Creat 2.31, , , Alk Phos 533, Lac 2.4, trop 64. AG 12, GFR 22  CXR: normal cardiomediastinal silhouette. Right basilar atelectasis. Haziness left lung base likely due to body habitus/soft tissue attenuation. The osseous structures are unremarkable. Right basilar atelectasis. Probable soft tissue attenuation over the left lung base. Head CT: No large vessel occlusion. No acute pathology in the head and neck. Microvascular ischemic and other age-related changes. HX: Type 2 Diabetes, HTN    INITIAL DX:   Seizures ([facility] [R56.9]     Current Treatment     TX: Insulin gtt, EEG    Hospital Course   Clinical progress has been uncomplicated. 1/19: ICU admission. Intubation   1/20: Neurology Consult: continuous EEG: suggestive of a mild-to-moderate generalized encephalopathic process, nonspecific in type. MRI when able. 1/21: Decadron x2 for laryngeal edema  1/22: Extubated  1/23: MRI negative for mass/hemorrhage or stroke  Diabetes History   Type 2 Diabetes: She tells me she has known about diabetes for less than 10 years  Ambulatory BG management provided by: PCP. She is not followed by her last PCP- looking for new provider  Family History Unknown    Diabetes-related Medical History  Acute complications  HHNK      Diabetes Medication History: Metformin 850mg BID.   She tells me she stopped taking this about a year ago      Diabetes self-management practices:   Very vague with diet details  Breakfast: Skips  Lunch: [place]  Dinner: \"whatever I can get\"  Snacks: Limited  Dessert: after dinner may have something sweet like a cookie  Drinks: 1 cup coffee in the am with a spoonful of sugar and creamer, Regular soda, water    Checking Sugar:  Does not have a glucometer kit at home    Exercise: Limited, no structured activity     Social Determinants of health: Concerned that she needs to know about diabetes. Overall: NOT achieving A1C goal with medication and self management practice     Lives with 2 sons and nephew  Per son patient was independent with cooking, cleaning and ADL's. She does not drive, family drives her to appointments  Family reports weight loss this month: Not able to tell me how much weight she has lost   reports pt won't take her diabetes medicine  No c/o polyuria, polydipsia   Subjective   \"I didn't like the way I felt on the metformin\"     Objective   Physical exam  General Overweight AA female laying in bed. Hoarse voice. Conversant and cooperative  Neuro  Awake, alert, oriented. Vital Signs Visit Vitals  /63   Pulse 79   Temp 97.4 °F (36.3 °C)   Resp 13   Ht 5' 3\" (1.6 m)   Wt 89.1 kg (196 lb 6.9 oz)   SpO2 92%   BMI 34.80 kg/m²     Skin  Warm and dry. Acanthosis noted along neckline. Heart   Regular rate and rhythm. No murmurs, rubs or gallops  Lungs  Clear to auscultation without rales or rhonchi  Extremities No foot wounds        Laboratory  Recent Labs     23  0411 23  0504 23  0542 23  0540   * 343* 180*  --    AGAP 3* 9 6  --    WBC 15.6* 13.8*  --  7.6   CREA 1.20* 1.29* 1.16*  --          Factors impacting BG management  Factor Dose Comments   Nutrition:   Carb consistent diet 60g CHO/meal    Drugs Decadron     Infection? Low suspicion  Likely related to steroid use  WBC 15.6  Blood Cx  NGTD    Kidney function JORGE  GFR 47  CKD?     Other:   Liver function   Elevated liver enzymes      Blood glucose pattern    Significant diabetes-related events over the past 24-72 hours  A1C >14%  Am labs: Na 136, , GFR 38   Off insulin gtt:  Fastin  Daytime B-336  Basal: 22 units Q12  Correction: 16 units   Decadron given x2 doses -    Assessment and Nursing Intervention   Nursing Diagnosis Risk for unstable blood glucose pattern   Nursing Intervention Domain 1514 Decision-making Support   Nursing Interventions Examined current inpatient diabetes/blood glucose control   Explored factors facilitating and impeding inpatient management  Explored corrective strategies with patient and responsible inpatient provider   Informed patient of rational for insulin strategy while hospitalized     Billing Code(s)     50008    Before making these care recommendations, I personally reviewed the hospitalization record, including notes, laboratory & diagnostic data and current medications, and examined the patient at the bedside (circumstances permitting) before determining care. More than fifty (50) percent of the time was spent in patient counseling and/or care coordination.   Total minutes: 54    MURALI Cadena  Diabetes Clinical Nurse Specialist  [facility]  Access via [employer]

## 2023-01-23 NOTE — PROGRESS NOTES
0730 Bedside and Verbal shift change report given to [address] [place] [address] (oncoming nurse) by Refugio Bojorquez RN (offgoing nurse). Report included the following information SBAR, Kardex, Intake/Output, MAR, Recent Results, Cardiac Rhythm NSR, Alarm Parameters , and Dual Neuro Assessment. Patient's PCP office [facility]) called to give contact information and update on needs for discharge planning. Dr. Ludy Shen [phone]. 1830 TRANSFER - OUT REPORT:    Verbal report given to Vicente Loya RN(name) on Neto Loving  being transferred to [facility](unit) for routine progression of care       Report consisted of patients Situation, Background, Assessment and   Recommendations(SBAR). Information from the following report(s) SBAR, Kardex, ED Summary, Intake/Output, MAR, Recent Results, Cardiac Rhythm NSR, and Alarm Parameters  was reviewed with the receiving nurse. Lines:   Peripheral IV 01/19/23 Right Antecubital (Active)   Site Assessment Clean, dry, & intact 01/23/23 1200   Phlebitis Assessment 0 01/23/23 1200   Infiltration Assessment 0 01/23/23 1200   Dressing Status Clean, dry, & intact 01/23/23 1200   Dressing Type Transparent;Tape 01/23/23 1200   Hub Color/Line Status Pink;Capped;Flushed 01/23/23 1200   Action Taken Open ports on tubing capped 01/23/23 1200   Alcohol Cap Used Yes 01/23/23 1200        Opportunity for questions and clarification was provided.       Patient transported with:   Registered Nurse

## 2023-01-23 NOTE — PROGRESS NOTES
CRITICAL CARE NOTE      Name: Mary Carney   : 1945   MRN: [id number]   Date: 2023      REASON FOR ICU ADMISSION:  Status Epilepticus     PRINCIPAL ICU DIAGNOSIS     Status epilepticus, new onset, resolved likely related to HHS, resolved  HHS, poorly controlled DM2  HTN  JORGE  Acute toxic metabolic encephalopathy, improved  Morbid obesity    BRIEF PATIENT SUMMARY     Mary Carney is a 78 y.o. female who has a PMH of DM2 and HTN and was found on the floor by her family with LKW at 0800. Family called EMS and the patient had a witnessed seizure with R sided gaze deviation that aborted with 2mg IV ativan. She was intubated for airway protection. Glucose was found to be 1157, LA 2.38. Corrected NA was 147. She loaded with 2g of keppra and transferred to [facility]. Upon arrival to [facility], she was intubated, not on sedation at the time of arrival and not requiring vasopressors. CT head unrevealing. MRI ordered, but vent compatible MRI machine has been unavailable. She was weaned off sedation and passed SBT on , but no cuff leak was present so she received IV decadron x2. She was successfully extubated on . COMPREHENSIVE ASSESSMENT & PLAN:SYSTEM BASED     24 HOUR EVENTS: Extubated greater than 24 hrs. Hemodynamically stable and A&Ox 3. Diabetes CNS saw today and adjusted insulin. Resumed CoregToday for better BP control. Swallow evaluation passed. IV Keppra and Lamictal changed to p.o. today. Ready for transfer out of ICU. Discussed with Hospitalist.     NEUROLOGICAL:     Seizure: Initially SE: New onset, no prior history of seizure: Possibly due to severe hyperglycemia/HHS.  CT head negative  - Treatment of HHS as below as this may have been the etiology  - No evidence of seizure activity at this time  - Keppra  - Vimpat  - MRI when able  - Neurology following    Toxic metabolic encephalopathy: Due to Seizure/HHS/Delirium  - Treatment of underlying conditions  - MRI for acute process. -Precedex off     PULMONOLOGY:     Extubated on 1/22  -Doing well greater than 24 hours. CARDIOVASCULAR:     HTN:   - Continued on home coreg  - Lasix given  - PRN hydralazine for SBP >160    GASTROINTESTINAL      Nutrition:   - Diabettic Diet  - NST following  - H2 for SUP ppx    RENAL/ELECTROLYTE/FLUIDS:     JORGE: likely due to prerenal state, improved    ENDOCRINE:     Poorly controlled DM2: Initially HHS: A1c >14  - Lantus   - SSI  --Diabetic CNS seen and recommendations placed    HEMATOLOGY/ONCOLOGY:     Anemia, likely due to acute illness: No evidence of current bleeding  - Transfuse to keep Hgb >7  - Daily CBC    INFECTIOUS DISEASE:     Leukocytosis: Likely reactive given steroid administration  - Procal elevated, but difficult to interpret in the setting of JORGE    Given the patient's overall clinical improvement and no other sign of infection, will trend this and leukocytosis tomorrow. If sign of infection develops, will order abx    ANTIBIOTICS TO DATE: None    CULTURES TO DATE:  Blood cultures 1/21: NGTD      ICU DAILY CHECKLIST     Code Status:F  DVT Prophylaxis:heparin SC  T/L/D: Tubes: PIVs  Lines: PIVs  Drains: N  SUP: Y  Diet: TF  Activity Level:as bobby  ABCDEF Bundle/Checklist Completed:Yes  Disposition: transfer out of ICU  Multidisciplinary Rounds Completed:  Y  Goals of Care Discussion/Palliative: Y  Patient/Family Updated: [address]     As per HPI    SUBJECTIVE   ROS  Unable to obtain    OBJECTIVE     Labs and Data: Reviewed 01/23/23  Medications: Reviewed 01/23/23  Imaging: Reviewed 01/23/23    Physical Exam  Vitals and nursing note reviewed. Constitutional:       Appearance: Normal appearance. HENT:      Head: Normocephalic. Nose: Nose normal.      Mouth/Throat:      Mouth: Mucous membranes are moist.   Eyes:      Pupils: Pupils are equal, round, and reactive to light. Cardiovascular:      Rate and Rhythm: Normal rate and regular rhythm. Pulses: Normal pulses. Pulmonary:      Breath sounds: Normal breath sounds. Comments: Diminished bases  Abdominal:      General: Bowel sounds are normal.      Palpations: Abdomen is soft. Comments: Obese   Musculoskeletal:      Comments: No edema   Skin:     General: Skin is warm and dry. Capillary Refill: Capillary refill takes less than 2 seconds. Neurological:      General: No focal deficit present. Mental Status: She is oriented to person, place, and time. Comments:  follows simple commands; strength symmetrical   Psychiatric:      Comments: Calm, cooperative        Visit Vitals  /61   Pulse 94   Temp 98 °F (36.7 °C)   Resp 16   Ht 5' 3\" (1.6 m)   Wt 89.1 kg (196 lb 6.9 oz)   SpO2 93%   BMI 34.80 kg/m²    O2 Flow Rate (L/min): 4 l/min O2 Device: None (Room air) Temp (24hrs), Av.1 °F (36.7 °C), Min:97.4 °F (36.3 °C), Max:98.9 °F (37.2 °C)           Intake/Output:     Intake/Output Summary (Last 24 hours) at 2023 1147  Last data filed at 2023 0800  Gross per 24 hour   Intake 880 ml   Output 600 ml   Net 280 ml         Imaging  MRI with no acute process         CRITICAL CARE DOCUMENTATION  I had a face to face encounter with the patient, reviewed and interpreted patient data including clinical events, labs, images, vital signs, I/O's, and examined patient. I have discussed the case and the plan and management of the patient's care with the consulting services, the bedside nurses and the respiratory therapist.      NOTE OF PERSONAL INVOLVEMENT IN CARE   This patient has a high probability of imminent, clinically significant deterioration, which requires the highest level of preparedness to intervene urgently. I participated in the decision-making and personally managed or directed the management of the following life and organ supporting interventions that required my frequent assessment to treat or prevent imminent deterioration.     I personally spent 45 minutes of critical care time. This is time spent at this critically ill patient's bedside actively involved in patient care as well as the coordination of care. This does not include any procedural time which has been billed separately.     ACP Updated family 1/21    Israel Toro, [id number] [facility]

## 2023-01-23 NOTE — PROGRESS NOTES
1930: Bedside and Verbal shift change report given to Kaiser Rosales (oncoming nurse) by Jaison Solomon RN (offgoing nurse). Report included the following information SBAR, Procedure Summary, Intake/Output, MAR, Recent Results, Cardiac Rhythm NSR, Alarm Parameters , and Dual Neuro Assessment. 2300: Patient traveled downstairs for MRI    0000: Patient back on unit, tolerated trip well    0730: Bedside and Verbal shift change report given to Alvin Lara (oncoming nurse) by Héctor Montilla RN (offgoing nurse). Report included the following information SBAR, Intake/Output, MAR, Recent Results, Cardiac Rhythm NSR, Alarm Parameters , and Dual Neuro Assessment.

## 2023-01-23 NOTE — PROGRESS NOTES
Problem: Mobility Impaired (Adult and Pediatric)  Goal: *Acute Goals and Plan of Care (Insert Text)  Description: FUNCTIONAL STATUS PRIOR TO ADMISSION: Patient was independent and active without use of DME.    HOME SUPPORT PRIOR TO ADMISSION: Pt lives with her son but did not require assistance. Physical Therapy Goals  Initiated 1/23/2023  1. Patient will move from supine to sit and sit to supine, scoot up and down, and roll side to side in bed with Renetta within 7 day(s). 2.  Patient will transfer from bed to chair and chair to bed with Renetta using the least restrictive device within 7 day(s). 3.  Patient will perform sit to stand with Renetta within 7 day(s). 4.  Patient will ambulate with modA for 25 feet with the least restrictive device within 7 day(s). Outcome: Not Met  PHYSICAL THERAPY EVALUATION  Patient: Jamie Simons (89 y.o. female)  Date: 1/23/2023  Primary Diagnosis: [facility] [R56.9]       Precautions: Fall    ASSESSMENT  Based on the objective data described below, the patient presents with generalized weakness, grossly impaired coordination (UEs > LEs), poor balance, decreased activity tolerance, impulsivity, decreased safety awareness, impaired cognition, and overall decline in functional mobility s/p admission with seizures - imagining negative for acute process at the time of this evaluation. At baseline, pt lives with her son and was independent with mobility and ADLs. This date, pt agreeable to work with therapy but required cuing/redirection to attend to current task. She transferred supine>sit, sit<>stand, and bed>chair with modA x2. Pt sat impulsively before properly aligning in front of chair surface. Ended session with all needs met and nursing updated. Recommending IPR upon discharge as pt is currently far below her independent baseline function.             Current Level of Function Impacting Discharge (mobility/balance): modA x2 for transfers     Functional Outcome Measure: The patient scored 2/56 on the Griffin outcome measure which is indicative of high fall risk. Other factors to consider for discharge: fall risk, independent baseline, impaired cognition      Patient will benefit from skilled therapy intervention to address the above noted impairments. PLAN :  Recommendations and Planned Interventions: bed mobility training, transfer training, gait training, therapeutic exercises, neuromuscular re-education, patient and family training/education, and therapeutic activities      Frequency/Duration: Patient will be followed by physical therapy:  5 times a week to address goals. Recommendation for discharge: (in order for the patient to meet his/her long term goals)  Therapy 3 hours per day 5-7 days per week    This discharge recommendation:  Has not yet been discussed the attending provider and/or case management    IF patient discharges home will need the following DME: to be determined (TBD)         SUBJECTIVE:   Patient stated I'm done with the food.     OBJECTIVE DATA SUMMARY:   HISTORY:    No past medical history on file. No past surgical history on file. Home Situation  Home Environment: Private residence  # Steps to Enter: 3  Rails to Enter: Yes  Hand Rails : Bilateral  One/Two Story Residence: One story  Living Alone: No  Support Systems: Child(bismark)  Patient Expects to be Discharged to[de-identified] Unable to determine at this time  Current DME Used/Available at Home: Grab bars  Tub or Shower Type: Tub/Shower combination    EXAMINATION/PRESENTATION/DECISION MAKING:   Critical Behavior:  Neurologic State: Alert, Eyes open spontaneously  Orientation Level: Oriented X4  Cognition: Follows commands, Poor safety awareness     Hearing:   Auditory  Auditory Impairment: None    Range Of Motion:  AROM: Generally decreased, functional           PROM: Generally decreased, functional           Strength:    Strength: Generally decreased, functional                    Tone & Sensation:   Tone: Normal              Sensation: Intact               Coordination:  Coordination: Grossly decreased, non-functional  Vision:      Functional Mobility:  Bed Mobility:     Supine to Sit: Moderate assistance;Assist x2  Sit to Supine:  (ended session in chair)     Transfers:  Sit to Stand: Moderate assistance;Assist x2  Stand to Sit: Moderate assistance;Assist x2        Bed to Chair: Moderate assistance;Assist x2              Balance:   Sitting: Impaired  Sitting - Static: Fair (occasional)  Standing: Impaired; With support  Standing - Static: Poor;Constant support  Standing - Dynamic : Poor;Constant support    Functional Measure:  Griffin Balance Test:    Sitting to Standin  Standing Unsupported: 0  Sitting with Back Unsupported: 2  Standing to Sittin  Transfers: 0  Standing Unsupported with Eyes Closed: 0  Standing Unsupported with Feet Together: 0  Reach Forward with Outstretched Arm: 0   Object: 0  Turn to Look Over Shoulders: 0  Turn 360 Degrees: 0  Alternate Foot on Step/Stool: 0  Standing Unsupported One Foot in Front: 0  Stand on One Le  Total: 2         56=Maximum possible score;   0-20=High fall risk  21-40=Moderate fall risk   41-56=Low fall risk     Activity Tolerance:   Fair    After treatment patient left in no apparent distress:   Sitting in chair, Call bell within reach, and Bed / chair alarm activated    COMMUNICATION/EDUCATION:   The patients plan of care was discussed with: Occupational therapist and Registered nurse. Fall prevention education was provided and the patient/caregiver indicated understanding., Patient/family have participated as able in goal setting and plan of care. , and Patient/family agree to work toward stated goals and plan of care.     Thank you for this referral.  Essence Valles, PT, DPT   Time Calculation: 19 mins

## 2023-01-23 NOTE — PROGRESS NOTES
Problem: Self Care Deficits Care Plan (Adult)  Goal: *Acute Goals and Plan of Care (Insert Text)  Description: FUNCTIONAL STATUS PRIOR TO ADMISSION: Patient was independent and active without use of DME. Patient was independent for basic and instrumental ADLs. HOME SUPPORT: The patient lived with her son but did not require assist.    Occupational Therapy Goals  Initiated 1/23/2023  1. Patient will perform grooming in standing with supervision/set-up within 7 day(s). 2.  Patient will perform lower body dressing with minimal assistance/contact guard assist within 7 day(s). 3.  Patient will perform self-feeding with modified independence within 7 day(s). 4.  Patient will perform toilet transfers with supervision/set-up within 7 day(s). 5.  Patient will perform all aspects of toileting with minimal assistance/contact guard assist within 7 day(s). 6.  Patient will participate in upper extremity therapeutic exercise/activities with supervision/set-up for 10 minutes within 7 day(s). 7.  Patient will utilize energy conservation techniques during functional activities with verbal and visual cues within 7 day(s). Outcome: Not Met   OCCUPATIONAL THERAPY EVALUATION  Patient: Bee Victor (44 y.o. female)  Date: 1/23/2023  Primary Diagnosis: Seizures ([facility] [R56.9]       Precautions: Fall    ASSESSMENT  Based on the objective data described below, the patient presents with impaired activity tolerance, decreased standing balance, SOB/wheezing breaths during session (nursing aware), decreased generalized strength, impaired coordination, decreased functional use of trey hands, and requiring increased assist for self care and functional mobility/transfers. Patient initially presented after being found on floor by her family, has a witnessed seizure after EMS called and R gaze deviation, patient's MRI was negative for acute process but noted with some seizure-like activity.  Patient intubated 1/19 and extubated 1/22 for airway protection. Patient received semi supine in bed, attempting to self feed without using utensils, difficulty holding onto food and frequently dropping, may benefit from built up handles on silverware once coordination improving. Patient transferred to sitting edge of bed and requiring cueing for safety during transfers. Patient stood from edge of bed and taking several quick steps towards recliner, beginning to sit without being lined up with recliner and continued to sit despite cueing and physical assist for safety. Patient repositioned for safety once sitting in recliner. During formal upper extremity assessment patient with nonfocal deficits and generalized decreased strength (right weaker than left), and impaired trey coordination, requiring increased time to participate in coordination tasks. Patient with noted deficits during formal assessment but worsened during functional tasks limited overall participation with feeding and lower extremity dressing. Overall patient did poor with session and limited by poor safety/impulsivity and impaired coordination. Patient would benefit from skilled OT services during admission to improve independence with self care and functional mobility/transfers. [facility] to [facility] at this time as pt independent prior to admission and well below functional baseline at this time. Current Level of Function Impacting Discharge (ADLs/self-care): mod A x2 for mobility/transfers, min A upper extremity activities of daily living, mod to max A lower extremity activities of daily living     Functional Outcome Measure: The patient scored Total: 30/100 on the Barthel Index outcome measure which is indicative of being very dependent in basic self-care.        Other factors to consider for discharge: prior level of function independent, lives with family, intubated for three days during admission     Patient will benefit from skilled therapy intervention to address the above noted impairments. PLAN :  Recommendations and Planned Interventions: self care training, functional mobility training, therapeutic exercise, balance training, visual/perceptual training, therapeutic activities, cognitive retraining, endurance activities, patient education, home safety training, and family training/education    Frequency/Duration: Patient will be followed by occupational therapy 5 times a week to address goals. Recommendation for discharge: (in order for the patient to meet his/her long term goals)  Therapy 3 hours per day 5-7 days per week    This discharge recommendation:  Has not yet been discussed the attending provider and/or case management    IF patient discharges home will need the following DME: shower chair, walker: rolling, wheelchair, and 24/7 physical assist       SUBJECTIVE:   Patient stated It's been harder to do.  when discussing self feeding. OBJECTIVE DATA SUMMARY:   HISTORY:   No past medical history on file. No past surgical history on file. Expanded or extensive additional review of patient history:     Home Situation  Home Environment: Private residence  # Steps to Enter: 3  Rails to Enter: Yes  Hand Rails : Bilateral  One/Two Story Residence: One story  Living Alone: No  Support Systems: Child(bismark)  Patient Expects to be Discharged to[de-identified] Unable to determine at this time  Current DME Used/Available at Home: Grab bars  Tub or Shower Type: Tub/Shower combination    Hand dominance: Right    EXAMINATION OF PERFORMANCE DEFICITS:  Cognitive/Behavioral Status:  Neurologic State: Alert  Orientation Level: Oriented X4  Cognition: Follows commands; Impulsive;Poor safety awareness  Perception: Appears intact  Perseveration: No perseveration noted  Safety/Judgement: Decreased awareness of need for assistance;Decreased insight into deficits; Fall prevention    Skin: intact where visible    Edema: slight trey hand swelling    Hearing:   Auditory  Auditory Impairment: None    Vision/Perceptual:    Tracking: Requires cues, head turns, or add eye shifts to track (generally decreased visual attention)                 Diplopia: No    Acuity: Impaired near vision; Impaired far vision    Corrective Lenses: Glasses (not present at bedside)    Range of Motion:  AROM: Generally decreased, functional  PROM: Generally decreased, functional                      Strength:  Strength: Generally decreased, functional                Coordination:  Coordination: Grossly decreased, non-functional            Tone & Sensation:  Tone: Normal  Sensation: Intact                      Balance:  Sitting: Impaired  Sitting - Static: Fair (occasional)  Standing: Impaired; With support  Standing - Static: Poor;Constant support  Standing - Dynamic : Poor;Constant support    Functional Mobility and Transfers for ADLs:  Bed Mobility:  Supine to Sit: Moderate assistance;Assist x2  Sit to Supine:  (ended session in chair)    Transfers:  Sit to Stand: Moderate assistance;Assist x2  Stand to Sit: Moderate assistance;Assist x2  Bed to Chair: Moderate assistance;Assist x2  Toilet Transfer : Moderate assistance;Assist x2 (infer to [facility])  Assistive Device : Gait Belt    ADL Assessment:  Feeding: Minimum assistance    Oral Facial Hygiene/Grooming: Minimum assistance (infer from coordination)    Bathing: Minimum assistance (infer from LE access)    Type of Bath: Chlorhexidine (CHG); Full    Upper Body Dressing: Minimum assistance (infer from functional reach and coordination)    Lower Body Dressing: Maximum assistance    Toileting: Moderate assistance (infer from functional reach and balance)                ADL Intervention and task modifications:  Feeding  Container Management: Maximum assistance  Utensil Management: Maximum assistance  Food to Mouth: Minimum assistance  Drink to Mouth: Minimum assistance  Cues: Verbal cues provided              Type of Bath: Chlorhexidine (CHG); Full              Lower Body Dressing Assistance  Socks: Maximum assistance  Leg Crossed Method Used: No  Position Performed: Seated in chair;Bending forward method  Cues: Verbal cues provided         Cognitive Retraining  Safety/Judgement: Decreased awareness of need for assistance;Decreased insight into deficits; Fall prevention     Functional Measure:    Barthel Index:  Bathin  Bladder: 5  Bowels: 10  Groomin  Dressin  Feedin  Mobility: 0  Stairs: 0  Toilet Use: 5  Transfer (Bed to Chair and Back): 5  Total: 30/100      The Barthel ADL Index: Guidelines  1. The index should be used as a record of what a patient does, not as a record of what a patient could do. 2. The main aim is to establish degree of independence from any help, physical or verbal, however minor and for whatever reason. 3. The need for supervision renders the patient not independent. 4. A patient's performance should be established using the best available evidence. Asking the patient, friends/relatives and nurses are the usual sources, but direct observation and common sense are also important. However direct testing is not needed. 5. Usually the patient's performance over the preceding 24-48 hours is important, but occasionally longer periods will be relevant. 6. Middle categories imply that the patient supplies over 50 per cent of the effort. 7. Use of aids to be independent is allowed. Score Interpretation (from [address] [address])    Independent   60-79 Minimally independent   40-59 Partially dependent   20-39 Very dependent   <20 Totally dependent     -Briana Christine., Barthel, D.W. (1965). Functional evaluation: the Barthel Index. [address] [facility] ([address] (). The Barthel activities of daily living index: self-reporting versus actual performance in the old (> or = 75 years). Journal of [address] [place] [address] 45(7), [facility], JDENIS, Joan King, Felicia Sánchez. (1999).  Measuring the change in disability after inpatient rehabilitation; comparison of the responsiveness of the Barthel Index and Functional [place] Measure. Journal of Neurology, Neurosurgery, and Psychiatry, 66(4), 497-379. AYAH Bravo, VIPIN Leon, & Rohith Berman M.A. (2004) Assessment of post-stroke quality of life in cost-effectiveness studies: The usefulness of the Barthel Index and the EuroQoL-5D. Quality of Life Research, 15, 658-66     Occupational Therapy Evaluation Charge Determination   History Examination Decision-Making   LOW Complexity : Brief history review  LOW Complexity : 1-3 performance deficits relating to physical, cognitive , or psychosocial skils that result in activity limitations and / or participation restrictions  LOW Complexity : No comorbidities that affect functional and no verbal or physical assistance needed to complete eval tasks       Based on the above components, the patient evaluation is determined to be of the following complexity level: LOW   Pain Rating:  None reported    Activity Tolerance:   Fair, SpO2 stable on RA, and requires rest breaks    After treatment patient left in no apparent distress:    Sitting in chair, Call bell within reach, and Bed / chair alarm activated    COMMUNICATION/EDUCATION:   The patients plan of care was discussed with: Physical therapist and Registered nurse. Patient/family have participated as able in goal setting and plan of care. and Patient/family agree to work toward stated goals and plan of care. This patients plan of care is appropriate for delegation to [facility].     Thank you for this referral.  Bernadette Burrows OTR/L  Time Calculation: 17 mins

## 2023-01-23 NOTE — PROGRESS NOTES
Neurology Staff Addendum:  I have personally seen and examined the patient. I have personally reviewed the chart and images. Elements of my examination included history of present illness, review of systems, review of past medical and surgical history, review of medications, and physical and neurological examination. I have personally reviewed the findings and impressions with the nurse practitioner and am in agreement with their note with changes below. Pt is a 79yo female with DM, HTN, admitted on transfer from [facility] on 1/19/23 after found down at home with tremulous movements, right gaze deviation, and unresponsive. Pt had a witnessed sz in ED, given Ativan and Keppra. CTH neg. CTA H/N no LVO or significant stenosis. Glu 1217, with hyponatremia, BUN/Cr 32/2. 31. AST//348. Pt had recurrent seizure activity on 1/23/23 with O2 sats in 70's, left gaze deviation, loss of bowel control, and Vimpat 200mg given. Continuous EEG 1/20/23 with right temporal periodic sharp waves. Pt was extubated 1/22/23, doing well, no seizures. MRI brain w/wo 1/22/23 wsa unremarkable. Currently on Keppra 750mg bid and Vimpat 50mg bid. Blood pressure 107/61, pulse 79, temperature 98 °F (36.7 °C), resp. rate 14, height 5' 3\" (1.6 m), weight 196 lb 6.9 oz (89.1 kg), SpO2 96 %. Physical Exam:  General: Well developed well nourished intubated patient in no apparent distress. Cardiac: Regular rate and rhythm with no murmurs. Extremities: 2+ Radial pulses, no cyanosis or edema     Neurological Exam:  Mental Status: Alert, oriented x 4, speech and language intact    Cranial Nerves:   VFF, PERRL, EOMI no nystagmus, no ptosis. Facial movement is symmetric. Motor:  5/5 throughout, no PD, no tremors   Reflexes:   Deep tendon reflexes 2+ and symmetric. Toes downgoing.    Sensory:   Intact to LT/PP throughout   Gait:  Unable to assess due to patient factors   Cerebellar:  FTN and ADDIS intact      A/P:  Pt is a 79yo female with DM, HTN, admitted on transfer from [facility] on 1/19/23 after found down at home with tremulous movements, right gaze deviation, and unresponsive. Pt had a witnessed sz in ED, given Ativan and Keppra. CTH neg. CTA H/N no LVO or significant stenosis. Glu 1217, with hyponatremia, BUN/Cr 32/2. 31. AST//348. Pt had recurrent seizure activity on 1/23/23 one dose of Vimpat given. Continuous EEG 1/20/23 with right temporal periodic sharp waves. Exam - non-focal.   Possible symptomatic sz due to hyperglycemia. Continue Keppra 750mg bid. Wean Vimpat. Follow up in neurology for ongoing management of ASDs, next available provider at any location. Please call if needed.       John Cho MD  [facility] Neurology  Neurology Progress Note        Admit Date: 1/19/2023    LOS: 4    Daily Progress Note: 01/23/23     Overnight Events:  patient was successfully extubated yesterday; patient with no seizure activity yesterday; currently on keppra and vimpat; awake and alert, confused conversation, weakly following commands x4 extremities; MRI completed, negative for mass, hemorrhage or stroke    Objective:     Visit Vitals  BP (!) 94/57   Pulse 72   Temp 97.7 °F (36.5 °C)   Resp 12   Ht 5' 3\" (1.6 m)   Wt 89.1 kg (196 lb 6.9 oz)   SpO2 93%   BMI 34.80 kg/m²       Physical Exam:  Adult female, no distress, cooperative with exam  Lungs: CTA bilaterally no wheezes, rales, ronchi  Heart: RRR, no murmurs, rubs, gallops  Skin warm and dry, cap refill: <2 seconds  No bleeding or hematoma noted     Neuro Exam:   Awake and alert, oriented to self and place  Following commands  PERRL 4 mm EOMI, face symmetrical  Motor exam, strength:   Antigravity x4 extremities, equal bilaterally  Pronator drift: absent  Cehryl's: negative  Clonus: negative  Babinski: downward bilaterally     Labs:  Recent Results (from the past 12 hour(s))   GLUCOSE, POC    Collection Time: 01/22/23  9:31 PM   Result Value Ref Range    Glucose (POC) 223 (H) 65 - 117 mg/dL    Performed by Rola Vieyra       Imaging:  MRI BRAIN W WO CONT    Result Date: 1/23/2023  No acute intracranial process. Moderate chronic microvascular skinny change and mild cerebral atrophy. No intracranial mass, hemorrhage or evidence of acute infarction.         Assessment:   Principal Problem:  Seizures, ongoing/improving metabolic encephalopathy    Active Problems:  DM2, HTN,     Plan:   Continue medical management per Primary Team  Continue keppra 750 mg bid; vimpat 100 mg bid  Repeat HCT for neuro decline  Avoid fluoroquinilones and 4th gen cephalosporins due to lowered seizure threshold  Correct metabolic and/or infectious derangements    Further recommendations pending full evaluation by Dr. Aaliyah Childs by:  Claudetta Skeens, PA-C  Neurocritical Care Physician Assistant  01/23/23   3:06 AM

## 2023-01-23 NOTE — PROGRESS NOTES
Problem: Pressure Injury - Risk of  Goal: *Prevention of pressure injury  Description: Document Calvin Scale and appropriate interventions in the flowsheet. Outcome: Progressing Towards Goal  Note: Pressure Injury Interventions:  Sensory Interventions: Assess changes in LOC, Check visual cues for pain, Discuss PT/OT consult with provider, Float heels, Keep linens dry and wrinkle-free, Minimize linen layers, Monitor skin under medical devices, Pressure redistribution bed/mattress (bed type)    Moisture Interventions: Check for incontinence Q2 hours and as needed, Absorbent underpads, Internal/External urinary devices, Minimize layers    Activity Interventions: Assess need for specialty bed, Pressure redistribution bed/mattress(bed type), PT/OT evaluation    Mobility Interventions: Assess need for specialty bed, Float heels, HOB 30 degrees or less, PT/OT evaluation, Turn and reposition approx. every two hours(pillow and wedges)    Nutrition Interventions: Document food/fluid/supplement intake    Friction and Shear Interventions: Apply protective barrier, creams and emollients, Lift sheet, Lift team/patient mobility team, Minimize layers, HOB 30 degrees or less                Problem: Falls - Risk of  Goal: *Absence of Falls  Description: Document Fercho Fall Risk and appropriate interventions in the flowsheet.   Outcome: Progressing Towards Goal  Note: Fall Risk Interventions:            Medication Interventions: Evaluate medications/consider consulting pharmacy    Elimination Interventions: Call light in reach, Toileting schedule/hourly rounds    History of Falls Interventions: Door open when patient unattended, Room close to nurse's station, Evaluate medications/consider consulting pharmacy         Problem: Ventilator Management  Goal: *Adequate oxygenation and ventilation  Outcome: Resolved/Met  Goal: *Patient maintains clear airway/free of aspiration  Outcome: Resolved/Met  Goal: *Absence of infection signs and symptoms  Outcome: Resolved/Met  Goal: *Normal spontaneous ventilation  Outcome: Resolved/Met     Problem: Diabetes Self-Management  Goal: *Disease process and treatment process  Description: Define diabetes and identify own type of diabetes; list 3 options for treating diabetes. Outcome: Progressing Towards Goal  Goal: *Incorporating nutritional management into lifestyle  Description: Describe effect of type, amount and timing of food on blood glucose; list 3 methods for planning meals. Outcome: Progressing Towards Goal  Goal: *Incorporating physical activity into lifestyle  Description: State effect of exercise on blood glucose levels. Outcome: Progressing Towards Goal  Goal: *Using medications safely  Description: State effect of diabetes medications on diabetes; name diabetes medication taking, action and side effects. Outcome: Progressing Towards Goal  Goal: *Monitoring blood glucose, interpreting and using results  Description: Identify recommended blood glucose targets  and personal targets. Outcome: Progressing Towards Goal  Goal: *Prevention, detection, treatment of acute complications  Description: List symptoms of hyper- and hypoglycemia; describe how to treat low blood sugar and actions for lowering  high blood glucose level. Outcome: Progressing Towards Goal  Goal: *Prevention, detection and treatment of chronic complications  Description: Define the natural course of diabetes and describe the relationship of blood glucose levels to long term complications of diabetes.   Outcome: Progressing Towards Goal  Goal: *Developing strategies to address psychosocial issues  Description: Describe feelings about living with diabetes; identify support needed and support network  Outcome: Progressing Towards Goal  Goal: *Insulin pump training  Outcome: Progressing Towards Goal  Goal: *Sick day guidelines  Outcome: Progressing Towards Goal     Problem: Non-Violent Restraints  Goal: Removal from restraints as soon as assessed to be safe  Outcome: Resolved/Met  Goal: No harm/injury to patient while restraints in use  Outcome: Resolved/Met  Goal: Patient's dignity will be maintained  Outcome: Resolved/Met  Goal: Patient Interventions  Outcome: Resolved/Met

## 2023-01-24 LAB
ANION GAP SERPL CALC-SCNC: 5 MMOL/L (ref 5–15)
BASOPHILS # BLD: 0.1 K/UL (ref 0–0.1)
BASOPHILS NFR BLD: 0 % (ref 0–1)
BUN SERPL-MCNC: 18 MG/DL (ref 6–20)
BUN/CREAT SERPL: 16 (ref 12–20)
CALCIUM SERPL-MCNC: 8.7 MG/DL (ref 8.5–10.1)
CHLORIDE SERPL-SCNC: 103 MMOL/L (ref 97–108)
CO2 SERPL-SCNC: 28 MMOL/L (ref 21–32)
CREAT SERPL-MCNC: 1.12 MG/DL (ref 0.55–1.02)
DIFFERENTIAL METHOD BLD: ABNORMAL
EOSINOPHIL # BLD: 0.2 K/UL (ref 0–0.4)
EOSINOPHIL NFR BLD: 1 % (ref 0–7)
ERYTHROCYTE [DISTWIDTH] IN BLOOD BY AUTOMATED COUNT: 12.9 % (ref 11.5–14.5)
GLUCOSE BLD STRIP.AUTO-MCNC: 104 MG/DL (ref 65–117)
GLUCOSE BLD STRIP.AUTO-MCNC: 127 MG/DL (ref 65–117)
GLUCOSE BLD STRIP.AUTO-MCNC: 147 MG/DL (ref 65–117)
GLUCOSE BLD STRIP.AUTO-MCNC: 181 MG/DL (ref 65–117)
GLUCOSE BLD STRIP.AUTO-MCNC: 52 MG/DL (ref 65–117)
GLUCOSE BLD STRIP.AUTO-MCNC: 56 MG/DL (ref 65–117)
GLUCOSE BLD STRIP.AUTO-MCNC: 58 MG/DL (ref 65–117)
GLUCOSE SERPL-MCNC: 66 MG/DL (ref 65–100)
HCT VFR BLD AUTO: 31.1 % (ref 35–47)
HGB BLD-MCNC: 10.1 G/DL (ref 11.5–16)
IMM GRANULOCYTES # BLD AUTO: 0.1 K/UL (ref 0–0.04)
IMM GRANULOCYTES NFR BLD AUTO: 1 % (ref 0–0.5)
LYMPHOCYTES # BLD: 2.4 K/UL (ref 0.8–3.5)
LYMPHOCYTES NFR BLD: 20 % (ref 12–49)
MAGNESIUM SERPL-MCNC: 2 MG/DL (ref 1.6–2.4)
MCH RBC QN AUTO: 28.2 PG (ref 26–34)
MCHC RBC AUTO-ENTMCNC: 32.5 G/DL (ref 30–36.5)
MCV RBC AUTO: 86.9 FL (ref 80–99)
MONOCYTES # BLD: 1.5 K/UL (ref 0–1)
MONOCYTES NFR BLD: 12 % (ref 5–13)
NEUTS SEG # BLD: 7.7 K/UL (ref 1.8–8)
NEUTS SEG NFR BLD: 66 % (ref 32–75)
NRBC # BLD: 0 K/UL (ref 0–0.01)
NRBC BLD-RTO: 0 PER 100 WBC
PHOSPHATE SERPL-MCNC: 2.4 MG/DL (ref 2.6–4.7)
PLATELET # BLD AUTO: 289 K/UL (ref 150–400)
PMV BLD AUTO: 10.4 FL (ref 8.9–12.9)
POTASSIUM SERPL-SCNC: 3.6 MMOL/L (ref 3.5–5.1)
RBC # BLD AUTO: 3.58 M/UL (ref 3.8–5.2)
SERVICE CMNT-IMP: ABNORMAL
SERVICE CMNT-IMP: NORMAL
SODIUM SERPL-SCNC: 136 MMOL/L (ref 136–145)
WBC # BLD AUTO: 11.9 K/UL (ref 3.6–11)

## 2023-01-24 PROCEDURE — 74011250636 HC RX REV CODE- 250/636: Performed by: NURSE PRACTITIONER

## 2023-01-24 PROCEDURE — 83735 ASSAY OF MAGNESIUM: CPT

## 2023-01-24 PROCEDURE — 74011250637 HC RX REV CODE- 250/637: Performed by: PSYCHIATRY & NEUROLOGY

## 2023-01-24 PROCEDURE — 94640 AIRWAY INHALATION TREATMENT: CPT

## 2023-01-24 PROCEDURE — 74011000250 HC RX REV CODE- 250: Performed by: NURSE PRACTITIONER

## 2023-01-24 PROCEDURE — 85025 COMPLETE CBC W/AUTO DIFF WBC: CPT

## 2023-01-24 PROCEDURE — 36415 COLL VENOUS BLD VENIPUNCTURE: CPT

## 2023-01-24 PROCEDURE — 97530 THERAPEUTIC ACTIVITIES: CPT

## 2023-01-24 PROCEDURE — 74011250637 HC RX REV CODE- 250/637: Performed by: NURSE PRACTITIONER

## 2023-01-24 PROCEDURE — 82962 GLUCOSE BLOOD TEST: CPT

## 2023-01-24 PROCEDURE — 99232 SBSQ HOSP IP/OBS MODERATE 35: CPT | Performed by: CLINICAL NURSE SPECIALIST

## 2023-01-24 PROCEDURE — 65270000046 HC RM TELEMETRY

## 2023-01-24 PROCEDURE — 84100 ASSAY OF PHOSPHORUS: CPT

## 2023-01-24 PROCEDURE — 80048 BASIC METABOLIC PNL TOTAL CA: CPT

## 2023-01-24 PROCEDURE — 74011636637 HC RX REV CODE- 636/637: Performed by: CLINICAL NURSE SPECIALIST

## 2023-01-24 RX ORDER — INSULIN GLARGINE 100 [IU]/ML
20 INJECTION, SOLUTION SUBCUTANEOUS
Status: DISCONTINUED | OUTPATIENT
Start: 2023-01-24 | End: 2023-01-25

## 2023-01-24 RX ADMIN — LACOSAMIDE 25 MG: 50 TABLET, FILM COATED ORAL at 22:03

## 2023-01-24 RX ADMIN — IPRATROPIUM BROMIDE AND ALBUTEROL SULFATE 3 ML: 2.5; .5 SOLUTION RESPIRATORY (INHALATION) at 09:48

## 2023-01-24 RX ADMIN — HEPARIN SODIUM 5000 UNITS: 5000 INJECTION INTRAVENOUS; SUBCUTANEOUS at 10:03

## 2023-01-24 RX ADMIN — NYSTATIN OINTMENT: 100000 OINTMENT TOPICAL at 18:30

## 2023-01-24 RX ADMIN — LEVETIRACETAM 750 MG: 500 TABLET, FILM COATED ORAL at 22:03

## 2023-01-24 RX ADMIN — Medication 4 UNITS: at 12:45

## 2023-01-24 RX ADMIN — INSULIN GLARGINE 20 UNITS: 100 INJECTION, SOLUTION SUBCUTANEOUS at 22:04

## 2023-01-24 RX ADMIN — SODIUM CHLORIDE, PRESERVATIVE FREE 10 ML: 5 INJECTION INTRAVENOUS at 22:07

## 2023-01-24 RX ADMIN — CARVEDILOL 6.25 MG: 6.25 TABLET, FILM COATED ORAL at 18:28

## 2023-01-24 RX ADMIN — HYDRALAZINE HYDROCHLORIDE 20 MG: 20 INJECTION INTRAMUSCULAR; INTRAVENOUS at 18:35

## 2023-01-24 RX ADMIN — CARVEDILOL 6.25 MG: 6.25 TABLET, FILM COATED ORAL at 10:04

## 2023-01-24 RX ADMIN — Medication 2 UNITS: at 12:45

## 2023-01-24 RX ADMIN — SODIUM CHLORIDE, PRESERVATIVE FREE 10 ML: 5 INJECTION INTRAVENOUS at 06:43

## 2023-01-24 RX ADMIN — HEPARIN SODIUM 5000 UNITS: 5000 INJECTION INTRAVENOUS; SUBCUTANEOUS at 18:29

## 2023-01-24 RX ADMIN — LEVETIRACETAM 750 MG: 500 TABLET, FILM COATED ORAL at 10:04

## 2023-01-24 RX ADMIN — IPRATROPIUM BROMIDE AND ALBUTEROL SULFATE 3 ML: 2.5; .5 SOLUTION RESPIRATORY (INHALATION) at 19:28

## 2023-01-24 RX ADMIN — LEVOTHYROXINE SODIUM 100 MCG: 0.1 TABLET ORAL at 06:43

## 2023-01-24 RX ADMIN — Medication 4 UNITS: at 18:28

## 2023-01-24 RX ADMIN — NYSTATIN OINTMENT: 100000 OINTMENT TOPICAL at 10:09

## 2023-01-24 RX ADMIN — LACOSAMIDE 25 MG: 50 TABLET, FILM COATED ORAL at 10:03

## 2023-01-24 RX ADMIN — SODIUM CHLORIDE, PRESERVATIVE FREE 10 ML: 5 INJECTION INTRAVENOUS at 14:00

## 2023-01-24 RX ADMIN — HEPARIN SODIUM 5000 UNITS: 5000 INJECTION INTRAVENOUS; SUBCUTANEOUS at 02:27

## 2023-01-24 NOTE — DIABETES MGMT
[facility]  PROGRAM FOR DIABETES HEALTH  DIABETES MANAGEMENT CONSULT    Consulted by  Joby Adamson NP  for advanced nursing evaluation and care for inpatient blood glucose management. Evaluation and Action Plan   Susan Forte is a 78year old female with Type 2 Diabetes who is admitted with HHS with coma and status epilepticus. She was intubated for airway protection and started on an insulin gtt. BG on admission was 1157, AG 12, urine with negative ketones. HHS was resolved and was transitioned off the insulin gtt with high dose Lantus Q12 and correctional humalog. Decadron 6mg x2 was given over the weekend for laryngeal edema, now off the [facility] and successfully extubated. BG this morning is 138. Given steroids out of system and eating, basal was weaned but fasting still below goal.  A1C is greater than 14% and patient tells me that she stopped her metformin due to side effects but never followed up with outpatient provider. She will need both inpatient and outpatient support for diabetes care. Inpatient BG goal is 140-180mg/dl. Management Rationale Action Plan   Medication   Basal needs Using 0.25 units/kg/D as fasting BG   below goal, elevated A1C Reduced total basal to 20 units Lantus HS   Bolus humalog Bolus humalog as eating. 4 units Humalog/meal   Corrective insulin Using normal sensitivity Normal sensitivity ACHS   Additional orders    Continue carb consistent diet, can adjust to 60g CHO/meal    Please encourage patient to participate in diabetes self care while in the hospital including allowing patient to   use lancet for blood glucose monitoring and self-administer insulin injections. Discharge Plan: Will need a FUV with PCP within 1-2 weeks after hospital discharge for ongoing diabetes management     On Discharge, order placed for \"diabetes education\".   This will trigger a referral for the Program for Diabetes Health which includes outpatient diabetes self management training with a certified diabetes educator. Prescription for glucometer kit (Meter, Lancets (100), Strips (100)). Patient to obtain a blood glucose reading four times daily. First thing in the morning prior to eating and drinking anything then before lunch, dinner and bedtime. Create a log and present to PCP for interpretation. Lantus Solostar: At hospital based dose    Humalog Kwikpen: Dose TBD    6. Pen Needle, Diabetic 32 Gauge x 5/32\" (1 box)            Initial Presentation   Dioni Prajapati is a 78 y.o. female who was transferred from [facility] after being found down and unresponsive with tremors at home by family on 1/19/23. On arrival to the ED, she was seizing, ativan was given and intubated for airway protection. In the ED< her BP was 226/170, . LAB: UA: 1000+ glucosuria, , BG 1217, Creat 2.31, , , Alk Phos 533, Lac 2.4, trop 64. AG 12, GFR 22  CXR: normal cardiomediastinal silhouette. Right basilar atelectasis. Haziness left lung base likely due to body habitus/soft tissue attenuation. The osseous structures are unremarkable. Right basilar atelectasis. Probable soft tissue attenuation over the left lung base. Head CT: No large vessel occlusion. No acute pathology in the head and neck. Microvascular ischemic and other age-related changes. HX: Type 2 Diabetes, HTN    INITIAL DX:   Seizures ([facility] [address] [R56.9]     Current Treatment     TX: Insulin gtt, EEG    Hospital Course   Clinical progress has been uncomplicated. 1/19: ICU admission. Intubation   1/20: Neurology Consult: continuous EEG: suggestive of a mild-to-moderate generalized encephalopathic process, nonspecific in type. MRI when able. 1/21: Decadron x2 for laryngeal edema  1/22: Extubated  1/23: MRI negative for mass/hemorrhage or stroke  Diabetes History   Type 2 Diabetes: She tells me she has known about diabetes for less than 10 years  Ambulatory BG management provided by: PCP.   She is not followed by her last PCP- looking for new provider  Family History Unknown    Diabetes-related Medical History  Acute complications  HHNK      Diabetes Medication History: Metformin 850mg BID. She tells me she stopped taking this about a year ago      Diabetes self-management practices:   Very vague with diet details  Breakfast: Skips  Lunch: [place]  Dinner: \"whatever I can get\"  Snacks: Limited  Dessert: after dinner may have something sweet like a cookie  Drinks: 1 cup coffee in the am with a spoonful of sugar and creamer, Regular soda, water    Checking Sugar:  Does not have a glucometer kit at home    Exercise: Limited, no structured activity     Social Determinants of health: Concerned that she needs to know about diabetes. Overall: NOT achieving A1C goal with medication and self management practice     Lives with 2 sons and nephew  Per son patient was independent with cooking, cleaning and ADL's. She does not drive, family drives her to appointments  Family reports weight loss this month: Not able to tell me how much weight she has lost   reports pt won't take her diabetes medicine  No c/o polyuria, polydipsia   Subjective   \"When can I go home? \"     Objective   Physical exam  General Overweight AA female laying in bed. Hoarse voice. Conversant and cooperative  Neuro  Awake, alert, oriented. Vital Signs Visit Vitals  BP (!) 144/76 (BP 1 Location: Right upper arm, BP Patient Position: Sitting)   Pulse 82   Temp 97.8 °F (36.6 °C)   Resp 19   Ht 5' 3\" (1.6 m)   Wt 88.2 kg (194 lb 7.1 oz)   SpO2 100%   BMI 34.44 kg/m²     Skin  Warm and dry. Acanthosis noted along neckline. Heart   Regular rate and rhythm.  No murmurs, rubs or gallops  Lungs  Clear to auscultation without rales or rhonchi  Extremities No foot wounds        Laboratory  Recent Labs     01/24/23  0030 01/23/23  0411 01/22/23  0504   GLU 66 158* 343*   AGAP 5 3* 9   WBC 11.9* 15.6* 13.8*   CREA 1.12* 1.20* 1.29*         Factors impacting BG management  Factor Dose Comments   Nutrition:   Carb consistent diet 60g CHO/meal    Drugs Decadron     Infection? Low suspicion  Likely related to steroid use  WBC 15.6  Blood Cx  NGTD    Kidney function JORGE  GFR 51  CKD? Other:   Liver function   Elevated liver enzymes      Blood glucose pattern    Significant diabetes-related events over the past 24-72 hours  A1C >14%  Am labs: Na 136, , GFR 38   Off insulin gtt:  Fastin  Daytime B-199  Basal: 36 units Lantus HS  Bolus: 4 units Humalog/meal  Correction: 16 units   Decadron given x2 doses -    Assessment and Nursing Intervention   Nursing Diagnosis Risk for unstable blood glucose pattern   Nursing Intervention Domain 5250 Decision-making Support   Nursing Interventions Examined current inpatient diabetes/blood glucose control   Explored factors facilitating and impeding inpatient management  Explored corrective strategies with patient and responsible inpatient provider   Informed patient of rational for insulin strategy while hospitalized       Nursing Diagnosis 51931 Ineffective Health Management   Nursing Intervention Domain 5250 Decision-makingSupport   Nursing Interventions Identified diabetes self-management practices impeding diabetes control  Discussed diabetes survival skills related to  Healthy Plate eating plan; given handouts  Role of physical activity in improving insulin sensitivity and action. Patient needed support with using an insulin demo pen. Procedure for blood glucose monitoring  Medications plan at discharge       Billing Code(s)   74583    Before making these care recommendations, I personally reviewed the hospitalization record, including notes, laboratory & diagnostic data and current medications, and examined the patient at the bedside (circumstances permitting) before determining care. More than fifty (50) percent of the time was spent in patient counseling and/or care coordination.   Total minutes: 7 [facility]  Diabetes Clinical Nurse Specialist  Program for Diabetes Health  Access via [address] [place] [address]

## 2023-01-24 NOTE — PROGRESS NOTES
Problem: Pressure Injury - Risk of  Goal: *Prevention of pressure injury  Description: Document Calvin Scale and appropriate interventions in the flowsheet. Outcome: Progressing Towards Goal  Note: Pressure Injury Interventions:  Sensory Interventions: Assess changes in LOC, Assess need for specialty bed, Avoid rigorous massage over bony prominences, Check visual cues for pain, Float heels, Keep linens dry and wrinkle-free, Minimize linen layers, Pad between skin to skin, Turn and reposition approx. every two hours (pillows and wedges if needed)    Moisture Interventions: Internal/External fecal devices, Minimize layers, Limit adult briefs, Maintain skin hydration (lotion/cream)    Activity Interventions: Assess need for specialty bed, Chair cushion, Increase time out of bed, PT/OT evaluation    Mobility Interventions: Turn and reposition approx. every two hours(pillow and wedges), HOB 30 degrees or less, Pressure redistribution bed/mattress (bed type), Float heels    Nutrition Interventions: Document food/fluid/supplement intake    Friction and Shear Interventions: Apply protective barrier, creams and emollients, Feet elevated on foot rest, Lift sheet, Minimize layers                Problem: Patient Education: Go to Patient Education Activity  Goal: Patient/Family Education  Outcome: Progressing Towards Goal     Problem: Falls - Risk of  Goal: *Absence of Falls  Description: Document Fercho Fall Risk and appropriate interventions in the flowsheet.   Outcome: Progressing Towards Goal  Note: Fall Risk Interventions:            Medication Interventions: Evaluate medications/consider consulting pharmacy    Elimination Interventions: Call light in reach, Toileting schedule/hourly rounds    History of Falls Interventions: Door open when patient unattended, Room close to nurse's station, Evaluate medications/consider consulting pharmacy         Problem: Patient Education: Go to Patient Education Activity  Goal: Patient/Family Education  Outcome: Progressing Towards Goal     Problem: Patient Education: Go to Patient Education Activity  Goal: Patient/Family Education  Outcome: Progressing Towards Goal     Problem: Nutrition Deficit  Goal: *Optimize nutritional status  Outcome: Progressing Towards Goal     Problem: Diabetes Self-Management  Goal: *Disease process and treatment process  Description: Define diabetes and identify own type of diabetes; list 3 options for treating diabetes. Outcome: Progressing Towards Goal  Goal: *Incorporating nutritional management into lifestyle  Description: Describe effect of type, amount and timing of food on blood glucose; list 3 methods for planning meals. Outcome: Progressing Towards Goal  Goal: *Incorporating physical activity into lifestyle  Description: State effect of exercise on blood glucose levels. Outcome: Progressing Towards Goal  Goal: *Developing strategies to promote health/change behavior  Description: Define the ABC's of diabetes; identify appropriate screenings, schedule and personal plan for screenings. Outcome: Progressing Towards Goal  Goal: *Using medications safely  Description: State effect of diabetes medications on diabetes; name diabetes medication taking, action and side effects. Outcome: Progressing Towards Goal  Goal: *Monitoring blood glucose, interpreting and using results  Description: Identify recommended blood glucose targets  and personal targets. Outcome: Progressing Towards Goal  Goal: *Prevention, detection, treatment of acute complications  Description: List symptoms of hyper- and hypoglycemia; describe how to treat low blood sugar and actions for lowering  high blood glucose level. Outcome: Progressing Towards Goal  Goal: *Prevention, detection and treatment of chronic complications  Description: Define the natural course of diabetes and describe the relationship of blood glucose levels to long term complications of diabetes.   Outcome: Progressing Towards Goal  Goal: *Developing strategies to address psychosocial issues  Description: Describe feelings about living with diabetes; identify support needed and support network  Outcome: Progressing Towards Goal  Goal: *Insulin pump training  Outcome: Progressing Towards Goal  Goal: *Sick day guidelines  Outcome: Progressing Towards Goal  Goal: *Patient Specific Goal (EDIT GOAL, INSERT TEXT)  Outcome: Progressing Towards Goal     Problem: Patient Education: Go to Patient Education Activity  Goal: Patient/Family Education  Outcome: Progressing Towards Goal

## 2023-01-24 NOTE — PROGRESS NOTES
Problem: Self Care Deficits Care Plan (Adult)  Goal: *Acute Goals and Plan of Care (Insert Text)  Description: FUNCTIONAL STATUS PRIOR TO ADMISSION: Patient was independent and active without use of DME. Patient was independent for basic and instrumental ADLs. HOME SUPPORT: The patient lived with her son but did not require assist.    Occupational Therapy Goals  Initiated 1/23/2023  1. Patient will perform grooming in standing with supervision/set-up within 7 day(s). 2.  Patient will perform lower body dressing with minimal assistance/contact guard assist within 7 day(s). 3.  Patient will perform self-feeding with modified independence within 7 day(s). 4.  Patient will perform toilet transfers with supervision/set-up within 7 day(s). 5.  Patient will perform all aspects of toileting with minimal assistance/contact guard assist within 7 day(s). 6.  Patient will participate in upper extremity therapeutic exercise/activities with supervision/set-up for 10 minutes within 7 day(s). 7.  Patient will utilize energy conservation techniques during functional activities with verbal and visual cues within 7 day(s). Outcome: Progressing Towards Goal   OCCUPATIONAL THERAPY TREATMENT  Patient: Brady Montes De Oca (80 y.o. female)  Date: 1/24/2023  [facility] [R56.9] <principal problem not specified>      Precautions: Fall  Chart, occupational therapy assessment, plan of care, and goals were reviewed. ASSESSMENT  Patient continues with skilled OT services and is progressing towards goals. Patient received semi supine in bed, initially not agreeable to working with therapy but does agree to participate with increased encouragement, patient motivated to discharge home and willing to work towards avoiding post acute rehab. Patient transferred to sitting edge of bed and able to don socks with CGA for sitting balance during task.  Patient stood from edge of bed and able to take several side steps towards head of bed, declines transfer to recliner this date and requesting to return to bed to rest at end of session. Patient reports improved self-feeding but not completed at this time although hands appear to have improving coordination during functional tasks. Patient would benefit from skilled OT services during admission to improve independence with self care and functional mobility/transfers. Recommend discharge to [facility] at this time. If patient refuses and discharges home, will at minimum need HHOT and increased family assist.     Current Level of Function Impacting Discharge (ADLs): CGA to min A x2 for mobility/transfers, CGA lower extremity dressing    Other factors to consider for discharge: prior level of function independent, lives with family, grandson dx with autism and pt helps to care give for him         PLAN :  Patient continues to benefit from skilled intervention to address the above impairments. Continue treatment per established plan of care to address goals. Recommend with staff: up to chair 3x/day for meals with assist x2 for transfers, [facility] for toileting    Recommend next OT session: continue POC    Recommendation for discharge: (in order for the patient to meet his/her long term goals)  Therapy 3 hours per day 5-7 days per week    This discharge recommendation:  Has been made in collaboration with the attending provider and/or case management    IF patient discharges home will need the following DME: walker: rolling and increased family assist       SUBJECTIVE:   Patient stated I'm going to punch that doctor if he makes me go to a nursing home.     OBJECTIVE DATA SUMMARY:   Cognitive/Behavioral Status:  Neurologic State: Alert  Orientation Level: Oriented X4  Cognition: Appropriate decision making; Appropriate for age attention/concentration; Follows commands;Poor safety awareness; Impulsive             Functional Mobility and Transfers for ADLs:  Bed Mobility:  Supine to Sit: Contact guard assistance  Sit to Supine: Contact guard assistance    Transfers:  Sit to Stand: Minimum assistance;Assist x2          Balance:  Sitting: Impaired  Sitting - Static: Fair (occasional)  Sitting - Dynamic: Fair (occasional); Poor (constant support)  Standing: Impaired; With support  Standing - Static: Poor;Constant support  Standing - Dynamic : Poor;Constant support    ADL Intervention:                 Type of Bath: Chlorhexidine (CHG); Full              Lower Body Dressing Assistance  Socks: Contact guard assistance  Leg Crossed Method Used: Yes  Position Performed: Seated edge of bed              Pain:  None reported    Activity Tolerance:   Fair and requires rest breaks    After treatment patient left in no apparent distress:   Supine in bed, Call bell within reach, Bed / chair alarm activated, and Side rails x 3    COMMUNICATION/COLLABORATION:   The patients plan of care was discussed with: Physical therapist and Registered nurse.      DARIO Baker/L  Time Calculation: 13 mins

## 2023-01-24 NOTE — PROGRESS NOTES
[facility] Adult  Hospitalist Group                                                                                          Hospitalist Progress Note  Chad Dugan MD  Answering service: [phone] from in house phone        Date of Service:  2023  NAME:  Bryan Floyd  :  1945  MRN:  [id number]      Admission Summary:     Bryan Floyd is a 78 y.o. female initially presented to outside hospital due to  change mental status. Last know well time had been ~ 0800 am. Pt was found unresponsive at home with no evidence of trauma. + tremors and gaze was to the right. Glucose was 1157 lactic acid was 2.38. Loaded with keppra,  intubated and transferred to [facility] for further care . Patient is not a very good historian, unable to provide much history leading up to current events. She reports she stopped taking her diabetes medicine because her doctor gave her medicines that were making her blood sugar \"too high\". She admits to taking her blood sugar the morning of her seizure event and says it was ~1100. When asked about other blood sugars, she reports she does not know. She is unable to recount when the last time is that she went to her physician and again, states that he was giving her inappropriate medications. Hospitalist were consulted for medical management and to evaluate for downgrade to the medical floor. Patient was seen and examined, she was lying in bed in no acute distress, reports she has a mildly sore throat Denies headaches, dizziness, chest pain, chest pressure, shortness of breath. Denies any GI complaints such as nausea, vomiting, diarrhea or constipation and has no dysuria. Patient looks medically stable for downgrade to medical floor. Reports that she has had COVID vaccinations in addition to boosters  Reports she has had the flu vaccine    Interval history / Subjective:     Patient is seen and examined. Patient poor historian.  She said she feels the same       Assessment & Plan:     New onset Seizure:   - New onset, no prior history of seizure: Possibly due to severe hyperglycemia/HHS. - CT head negative  - on Keppra, VIMPAT  - EEG showed periodic appearing blunted sharp wave activity on the right, indicating a potential seizure focus but no electrographic or clinical seizures were noted. - MRI shows no acute intracranial process. Moderate chronic microvascular change and mild cerebral atrophy. No intracranial mass, hemorrhage or evidence of acute infarction. - seizure precatuion,   - Neurologist on board      HHS   - Occurring in the setting of poorly controlled DM2:   - DKA protocol insulin drip, has been transitioned to Lantus now with sliding scale insulin. - DM education has been ordered  - Hemoglobin A1c >14     Toxic metabolic encephalopathy:   - Due to Seizure/HHS/Delirium  - Resolving     Pseudohyponatremia:   - Corrected  when adjusted for glucose  - Continue to trend BMP     JORGE:   - likely due to prerenal state, resolving  - Received aggressive fluid resuscitation  - monitor renal function          Code status: Full Code  Prophylaxis: SCD  Care Plan discussed with: Patient, nurse and CM  Anticipated Disposition: SNF      Hospital Problems  Never Reviewed            Codes Class Noted POA    Seizures ([facility] [address] ICD-10-CM: R56.9  ICD-9-CM: 780.39  1/19/2023 Unknown           Vital Signs:    Last 24hrs VS reviewed since prior progress note.  Most recent are:  Visit Vitals  BP (!) 178/91   Pulse 82   Temp 99.1 °F (37.3 °C)   Resp 16   Ht 5' 3\" (1.6 m)   Wt 88.2 kg (194 lb 7.1 oz)   SpO2 99%   BMI 34.44 kg/m²         Intake/Output Summary (Last 24 hours) at 1/24/2023 1814  Last data filed at 1/24/2023 0000  Gross per 24 hour   Intake --   Output 150 ml   Net -150 ml        Physical Examination:     I had a face to face encounter with this patient and independently examined them on 1/24/2023 as outlined below:          Constitutional:  No acute distress, cooperative, pleasant    ENT:  Oral mucosa moist, oropharynx benign. Resp:  CTA bilaterally. No wheezing/rhonchi/rales. No accessory muscle use. CV:  Regular rhythm, normal rate, no murmurs, gallops, rubs    GI:  Soft, non distended, non tender. normoactive bowel sounds, no hepatosplenomegaly     Musculoskeletal:  No edema,     Neurologic:  Moves all extremities. AAOx3, CN II-XII reviewed            Data Review:    Review and/or order of clinical lab test  Review and/or order of tests in the radiology section of CPT  Review and/or order of tests in the medicine section of CPT      Labs:     Recent Labs     01/24/23  0030 01/23/23  0411   WBC 11.9* 15.6*   HGB 10.1* 10.5*   HCT 31.1* 32.8*    274     Recent Labs     01/24/23  0030 01/23/23  0411 01/22/23  0504    134* 135*   K 3.6 4.1 3.5    101 100   CO2 28 30 26   BUN 18 20 22*   CREA 1.12* 1.20* 1.29*   GLU 66 158* 343*   CA 8.7 9.2 8.4*   MG 2.0 2.3 2.1  2.2   PHOS 2.4* 2.4* 2.6  2.6     No results for input(s): ALT, AP, TBIL, TBILI, TP, ALB, GLOB, GGT, AML, LPSE in the last 72 hours. No lab exists for component: SGOT, GPT, AMYP, HLPSE  No results for input(s): INR, PTP, APTT, INREXT in the last 72 hours. No results for input(s): FE, TIBC, PSAT, FERR in the last 72 hours. No results found for: FOL, RBCF   No results for input(s): PH, PCO2, PO2 in the last 72 hours. No results for input(s): CPK, CKNDX, TROIQ in the last 72 hours.     No lab exists for component: CPKMB  No results found for: CHOL, CHOLX, CHLST, CHOLV, HDL, HDLP, LDL, LDLC, DLDLP, TGLX, TRIGL, TRIGP, CHHD, CHHDX  Lab Results   Component Value Date/Time    Glucose (POC) 127 (H) 01/24/2023 04:51 PM    Glucose (POC) 181 (H) 01/24/2023 12:14 PM    Glucose (POC) 104 01/24/2023 09:57 AM    Glucose (POC) 56 (L) 01/24/2023 09:17 AM    Glucose (POC) 58 (L) 01/24/2023 08:54 AM     Lab Results   Component Value Date/Time Color YELLOW/STRAW 01/19/2023 04:40 PM    Appearance CLEAR 01/19/2023 04:40 PM    pH (UA) 5.5 01/19/2023 04:40 PM    Protein Negative 01/19/2023 04:40 PM    Glucose >1,000 (A) 01/19/2023 04:40 PM    Ketone Negative 01/19/2023 04:40 PM    Bilirubin Negative 01/19/2023 04:40 PM    Urobilinogen 0.2 01/19/2023 04:40 PM    Nitrites Negative 01/19/2023 04:40 PM    Leukocyte Esterase Negative 01/19/2023 04:40 PM    Epithelial cells FEW 01/19/2023 04:40 PM    Bacteria Negative 01/19/2023 04:40 PM    WBC 0-4 01/19/2023 04:40 PM    RBC 0-5 01/19/2023 04:40 PM         Medications Reviewed:     Current Facility-Administered Medications   Medication Dose Route Frequency    insulin glargine (LANTUS) injection 20 Units  20 Units SubCUTAneous QHS    albuterol-ipratropium (DUO-NEB) 2.5 MG-0.5 MG/3 ML  3 mL Nebulization Q4H PRN    dextrose 10 % infusion 0-250 mL  0-250 mL IntraVENous PRN    insulin lispro (HUMALOG) injection 4 Units  0.05 Units/kg SubCUTAneous TID WITH MEALS    insulin lispro (HUMALOG) injection   SubCUTAneous AC&HS    levETIRAcetam (KEPPRA) tablet 750 mg  750 mg Oral Q12H    carvediloL (COREG) tablet 6.25 mg  6.25 mg Oral BID WITH MEALS    lacosamide (VIMPAT) tablet 25 mg  25 mg Oral Q12H    racEPINEPHrine (VAPONEFRIN) 2.25% nebulizer solution  0.5 mL Nebulization Q2H PRN    guaiFENesin (ROBITUSSIN) 100 mg/5 mL oral liquid 400 mg  400 mg Oral Q4H PRN    levothyroxine (SYNTHROID) tablet 100 mcg  100 mcg Per NG tube 6am    hydrALAZINE (APRESOLINE) 20 mg/mL injection 20 mg  20 mg IntraVENous Q6H PRN    polyvinyl alcohol-povidone (NATURAL TEARS) 0.5-0.6 % ophthalmic solution 1 Drop  1 Drop Both Eyes PRN    glucose chewable tablet 16 g  4 Tablet Oral PRN    glucagon (GLUCAGEN) injection 1 mg  1 mg IntraMUSCular PRN    dextrose 10 % infusion 0-250 mL  0-250 mL IntraVENous PRN    sodium chloride (NS) flush 5-40 mL  5-40 mL IntraVENous Q8H    sodium chloride (NS) flush 5-40 mL  5-40 mL IntraVENous PRN    acetaminophen (TYLENOL) tablet 650 mg  650 mg Oral Q6H PRN    Or    acetaminophen (TYLENOL) suppository 650 mg  650 mg Rectal Q6H PRN    polyethylene glycol (MIRALAX) packet 17 g  17 g Oral DAILY PRN    ondansetron (ZOFRAN ODT) tablet 4 mg  4 mg Oral Q8H PRN    Or    ondansetron (ZOFRAN) injection 4 mg  4 mg IntraVENous Q6H PRN    nystatin (MYCOSTATIN) 100,000 unit/gram ointment   Topical BID    heparin (porcine) injection 5,000 Units  5,000 Units SubCUTAneous Q8H     ______________________________________________________________________  EXPECTED LENGTH OF STAY: 4d 9h  ACTUAL LENGTH OF STAY:          5                 Nighat Richards MD

## 2023-01-24 NOTE — PROGRESS NOTES
Problem: Pressure Injury - Risk of  Goal: *Prevention of pressure injury  Description: Document Calvin Scale and appropriate interventions in the flowsheet. Outcome: Progressing Towards Goal  Note: Pressure Injury Interventions:  Sensory Interventions: Assess changes in LOC, Assess need for specialty bed, Avoid rigorous massage over bony prominences, Check visual cues for pain, Float heels, Keep linens dry and wrinkle-free, Minimize linen layers, Pad between skin to skin, Turn and reposition approx. every two hours (pillows and wedges if needed)    Moisture Interventions: Absorbent underpads, Apply protective barrier, creams and emollients, Assess need for specialty bed, Check for incontinence Q2 hours and as needed, Internal/External urinary devices, Maintain skin hydration (lotion/cream), Minimize layers, Moisture barrier, Offer toileting Q_hr    Activity Interventions: Assess need for specialty bed, Increase time out of bed, PT/OT evaluation    Mobility Interventions: Assess need for specialty bed, Float heels, HOB 30 degrees or less, PT/OT evaluation, Turn and reposition approx. every two hours(pillow and wedges)    Nutrition Interventions: Document food/fluid/supplement intake, Discuss nutritional consult with provider, Offer support with meals,snacks and hydration    Friction and Shear Interventions: Apply protective barrier, creams and emollients, Feet elevated on foot rest, Foam dressings/transparent film/skin sealants, HOB 30 degrees or less, Lift sheet, Minimize layers, Sit at 90-degree angle, Transferring/repositioning devices                Problem: Patient Education: Go to Patient Education Activity  Goal: Patient/Family Education  Outcome: Progressing Towards Goal     Problem: Falls - Risk of  Goal: *Absence of Falls  Description: Document Vern Durham Fall Risk and appropriate interventions in the flowsheet.   Outcome: Progressing Towards Goal  Note: Fall Risk Interventions:            Medication Interventions: Evaluate medications/consider consulting pharmacy    Elimination Interventions: Call light in reach, Toileting schedule/hourly rounds    History of Falls Interventions: Door open when patient unattended, Room close to nurse's station, Evaluate medications/consider consulting pharmacy         Problem: Patient Education: Go to Patient Education Activity  Goal: Patient/Family Education  Outcome: Progressing Towards Goal     Problem: Nutrition Deficit  Goal: *Optimize nutritional status  Outcome: Progressing Towards Goal     Problem: Diabetes Self-Management  Goal: *Disease process and treatment process  Description: Define diabetes and identify own type of diabetes; list 3 options for treating diabetes. Outcome: Progressing Towards Goal  Goal: *Incorporating nutritional management into lifestyle  Description: Describe effect of type, amount and timing of food on blood glucose; list 3 methods for planning meals. Outcome: Progressing Towards Goal  Goal: *Incorporating physical activity into lifestyle  Description: State effect of exercise on blood glucose levels. Outcome: Progressing Towards Goal  Goal: *Developing strategies to promote health/change behavior  Description: Define the ABC's of diabetes; identify appropriate screenings, schedule and personal plan for screenings. Outcome: Progressing Towards Goal  Goal: *Using medications safely  Description: State effect of diabetes medications on diabetes; name diabetes medication taking, action and side effects. Outcome: Progressing Towards Goal  Goal: *Monitoring blood glucose, interpreting and using results  Description: Identify recommended blood glucose targets  and personal targets. Outcome: Progressing Towards Goal  Goal: *Prevention, detection, treatment of acute complications  Description: List symptoms of hyper- and hypoglycemia; describe how to treat low blood sugar and actions for lowering  high blood glucose level.   Outcome: Progressing Towards Goal  Goal: *Prevention, detection and treatment of chronic complications  Description: Define the natural course of diabetes and describe the relationship of blood glucose levels to long term complications of diabetes.   Outcome: Progressing Towards Goal  Goal: *Developing strategies to address psychosocial issues  Description: Describe feelings about living with diabetes; identify support needed and support network  Outcome: Progressing Towards Goal     Problem: Patient Education: Go to Patient Education Activity  Goal: Patient/Family Education  Outcome: Progressing Towards Goal     Problem: Seizure Disorder (Adult)  Goal: *STG: Remains free of seizure activity  Outcome: Progressing Towards Goal  Goal: *STG: Maintains lab values within therapeutic range  Outcome: Progressing Towards Goal  Goal: *STG/LTG: Complies with medication therapy  Outcome: Progressing Towards Goal  Goal: *STG: Remains free of injury during seizure activity  Outcome: Progressing Towards Goal  Goal: *STG: Remains safe in hospital  Outcome: Progressing Towards Goal  Goal: Interventions  Outcome: Progressing Towards Goal     Problem: Patient Education: Go to Patient Education Activity  Goal: Patient/Family Education  Outcome: Progressing Towards Goal

## 2023-01-24 NOTE — PROGRESS NOTES
Problem: Mobility Impaired (Adult and Pediatric)  Goal: *Acute Goals and Plan of Care (Insert Text)  Description: FUNCTIONAL STATUS PRIOR TO ADMISSION: Patient was independent and active without use of DME.    HOME SUPPORT PRIOR TO ADMISSION: Pt lives with her son but did not require assistance. Physical Therapy Goals  Initiated 1/23/2023  1. Patient will move from supine to sit and sit to supine, scoot up and down, and roll side to side in bed with Renetta within 7 day(s). 2.  Patient will transfer from bed to chair and chair to bed with Renetta using the least restrictive device within 7 day(s). 3.  Patient will perform sit to stand with Renetta within 7 day(s). 4.  Patient will ambulate with modA for 25 feet with the least restrictive device within 7 day(s). Outcome: Progressing Towards Goal  PHYSICAL THERAPY TREATMENT  Patient: Bryan Floyd (32 y.o. female)  Date: 1/24/2023  Diagnosis: [facility] [R56.9] <principal problem not specified>      Precautions: Fall  Chart, physical therapy assessment, plan of care and goals were reviewed. ASSESSMENT  Patient continues with skilled PT services and is progressing towards goals. Pt presents with generalized weakness, impaired coordination B hands, impaired balance, decreased activity tolerance, decreased insight into deficits, and overall decline in functional mobility compared to her independent baseline function. This date, pt transferred supine<>sit with CGA and additional time. Pt with fair-poor sitting balance d/t posterior lean. Pt transferred sit<>stand and took a few sidesteps along EOB with Renetta x2, bilat HHA, and VC for sequencing. Pt declined getting into chair and requested return to bed. Ended session with all needs met. Recommending IPR upon discharge.        Current Level of Function Impacting Discharge (mobility/balance): Renetta x2 for sit<>stand transfers    Other factors to consider for discharge: fall risk, independent baseline, decreased insight into deficits          PLAN :  Patient continues to benefit from skilled intervention to address the above impairments. Continue treatment per established plan of care. to address goals. Recommendation for discharge: (in order for the patient to meet his/her long term goals)  Therapy 3 hours per day 5-7 days per week  If d/c home, pt will require assistance of 1-2 people for all mobility as she is far below her independent baseline function. This discharge recommendation:  Has not yet been discussed the attending provider and/or case management    IF patient discharges home will need the following DME: BSC, possibly wheelchair        SUBJECTIVE:   Patient stated [facility] aren't sending me to a nursing home. I'm gonna have to beat them up.     OBJECTIVE DATA SUMMARY:   Critical Behavior:  Neurologic State: Eyes open spontaneously, Alert  Orientation Level: Disoriented to situation, Disoriented to time  Cognition: Decreased command following, Decreased attention/concentration, Poor safety awareness  Safety/Judgement: Decreased awareness of need for assistance, Decreased insight into deficits, Fall prevention  Functional Mobility Training:  Bed Mobility:     Supine to Sit: Contact guard assistance  Sit to Supine: Contact guard assistance           Transfers:  Sit to Stand: Minimum assistance;Assist x2  Stand to Sit: Minimum assistance;Assist x2                             Balance:  Sitting: Impaired  Sitting - Static: Fair (occasional)  Sitting - Dynamic: Fair (occasional); Poor (constant support)  Standing: Impaired; With support  Standing - Static: Poor;Constant support  Standing - Dynamic : Poor;Constant support  Ambulation/Gait Training:  Distance (ft): 3 Feet (ft) (side steps)  Assistive Device: Gait belt (Bilat HHA)  Ambulation - Level of Assistance: Minimal assistance;Assist x2        Gait Abnormalities: Decreased step clearance;Shuffling gait;Trunk sway increased        Base of Support: Narrowed  Stance: Left decreased;Right decreased  Speed/Lizbeth: Slow;Shuffled  Step Length: Right shortened;Left shortened      Activity Tolerance:   Poor    After treatment patient left in no apparent distress:   Supine in bed, Call bell within reach, Bed / chair alarm activated, and Side rails x 3    COMMUNICATION/COLLABORATION:   The patients plan of care was discussed with: Occupational therapist and Registered nurse.      Jared Gillis, PT, DPT   Time Calculation: 15 mins

## 2023-01-24 NOTE — PROGRESS NOTES
Transition of Care Plan: home with home health (patient refusing rehab)  Helena Kaye patient    RUR: 10% low    PCP F/U:Dr Dari Bermudez    Disposition: home with home health     Transportation: family    Main Contact: Siva Gerber: 31 11 31: Discussed patient in rounds. Therapy recommending IPR, but hospitalist and vituity nurse would like this CM to work on SNF instead. Will discuss with patient/family. 1422: Telephone call to patient's son Alexi Guan to discuss plans at discharge. VM full and therapy working with patient. Will try again another time. 1500: Met with patient at the bedside. Introduced self and role of CM. Discussed recommendation for rehab at discharge and provided SNF list. States she is not going to rehab when she is discharged and will be going home. Is agreeable for this CM to set up home health.  Will continue to follow    Shruthi Gomes RN, CRM

## 2023-01-24 NOTE — PROGRESS NOTES
Bedside and Verbal shift change report given to Gokul Craft (oncoming nurse) by Rick Riley (offgoing nurse). Report included the following information SBAR, Kardex, Intake/Output, MAR, Recent Results, Cardiac Rhythm NSR, and Dual Neuro Assessment.

## 2023-01-24 NOTE — PROGRESS NOTES
Bedside and Verbal shift change report given to [address] [place] [address]  (oncoming nurse) by Cassius Hi RN  (offgoing nurse). Report included the following information SBAR, Kardex, MAR, Cardiac Rhythm Normal Sinus, and Dual Neuro Assessment.

## 2023-01-25 ENCOUNTER — APPOINTMENT (OUTPATIENT)
Dept: GENERAL RADIOLOGY | Age: 78
DRG: 637 | End: 2023-01-25
Attending: HOSPITALIST
Payer: MEDICARE

## 2023-01-25 LAB
ANION GAP SERPL CALC-SCNC: 5 MMOL/L (ref 5–15)
BASOPHILS # BLD: 0.1 K/UL (ref 0–0.1)
BASOPHILS NFR BLD: 1 % (ref 0–1)
BUN SERPL-MCNC: 17 MG/DL (ref 6–20)
BUN/CREAT SERPL: 16 (ref 12–20)
CALCIUM SERPL-MCNC: 8.8 MG/DL (ref 8.5–10.1)
CHLORIDE SERPL-SCNC: 106 MMOL/L (ref 97–108)
CO2 SERPL-SCNC: 25 MMOL/L (ref 21–32)
CREAT SERPL-MCNC: 1.08 MG/DL (ref 0.55–1.02)
DIFFERENTIAL METHOD BLD: ABNORMAL
EOSINOPHIL # BLD: 0.2 K/UL (ref 0–0.4)
EOSINOPHIL NFR BLD: 2 % (ref 0–7)
ERYTHROCYTE [DISTWIDTH] IN BLOOD BY AUTOMATED COUNT: 12.8 % (ref 11.5–14.5)
GLUCOSE BLD STRIP.AUTO-MCNC: 109 MG/DL (ref 65–117)
GLUCOSE BLD STRIP.AUTO-MCNC: 129 MG/DL (ref 65–117)
GLUCOSE BLD STRIP.AUTO-MCNC: 155 MG/DL (ref 65–117)
GLUCOSE BLD STRIP.AUTO-MCNC: 91 MG/DL (ref 65–117)
GLUCOSE SERPL-MCNC: 120 MG/DL (ref 65–100)
HCT VFR BLD AUTO: 34.6 % (ref 35–47)
HGB BLD-MCNC: 10.8 G/DL (ref 11.5–16)
IMM GRANULOCYTES # BLD AUTO: 0.2 K/UL (ref 0–0.04)
IMM GRANULOCYTES NFR BLD AUTO: 2 % (ref 0–0.5)
LYMPHOCYTES # BLD: 2.2 K/UL (ref 0.8–3.5)
LYMPHOCYTES NFR BLD: 22 % (ref 12–49)
MAGNESIUM SERPL-MCNC: 1.6 MG/DL (ref 1.6–2.4)
MCH RBC QN AUTO: 27.5 PG (ref 26–34)
MCHC RBC AUTO-ENTMCNC: 31.2 G/DL (ref 30–36.5)
MCV RBC AUTO: 88 FL (ref 80–99)
MONOCYTES # BLD: 1.3 K/UL (ref 0–1)
MONOCYTES NFR BLD: 13 % (ref 5–13)
NEUTS SEG # BLD: 5.8 K/UL (ref 1.8–8)
NEUTS SEG NFR BLD: 60 % (ref 32–75)
NRBC # BLD: 0.02 K/UL (ref 0–0.01)
NRBC BLD-RTO: 0.2 PER 100 WBC
PHOSPHATE SERPL-MCNC: 2.5 MG/DL (ref 2.6–4.7)
PLATELET # BLD AUTO: 320 K/UL (ref 150–400)
PMV BLD AUTO: 10.1 FL (ref 8.9–12.9)
POTASSIUM SERPL-SCNC: 4 MMOL/L (ref 3.5–5.1)
RBC # BLD AUTO: 3.93 M/UL (ref 3.8–5.2)
RBC MORPH BLD: ABNORMAL
SERVICE CMNT-IMP: ABNORMAL
SERVICE CMNT-IMP: ABNORMAL
SERVICE CMNT-IMP: NORMAL
SERVICE CMNT-IMP: NORMAL
SODIUM SERPL-SCNC: 136 MMOL/L (ref 136–145)
WBC # BLD AUTO: 9.8 K/UL (ref 3.6–11)
WBC MORPH BLD: ABNORMAL

## 2023-01-25 PROCEDURE — 36415 COLL VENOUS BLD VENIPUNCTURE: CPT

## 2023-01-25 PROCEDURE — 82962 GLUCOSE BLOOD TEST: CPT

## 2023-01-25 PROCEDURE — 84100 ASSAY OF PHOSPHORUS: CPT

## 2023-01-25 PROCEDURE — 80048 BASIC METABOLIC PNL TOTAL CA: CPT

## 2023-01-25 PROCEDURE — 97116 GAIT TRAINING THERAPY: CPT

## 2023-01-25 PROCEDURE — 74011250637 HC RX REV CODE- 250/637: Performed by: PSYCHIATRY & NEUROLOGY

## 2023-01-25 PROCEDURE — 97530 THERAPEUTIC ACTIVITIES: CPT

## 2023-01-25 PROCEDURE — 97535 SELF CARE MNGMENT TRAINING: CPT

## 2023-01-25 PROCEDURE — 99231 SBSQ HOSP IP/OBS SF/LOW 25: CPT | Performed by: CLINICAL NURSE SPECIALIST

## 2023-01-25 PROCEDURE — 74011000250 HC RX REV CODE- 250: Performed by: HOSPITALIST

## 2023-01-25 PROCEDURE — 74011250637 HC RX REV CODE- 250/637: Performed by: NURSE PRACTITIONER

## 2023-01-25 PROCEDURE — 65270000046 HC RM TELEMETRY

## 2023-01-25 PROCEDURE — 94640 AIRWAY INHALATION TREATMENT: CPT

## 2023-01-25 PROCEDURE — 74011250636 HC RX REV CODE- 250/636: Performed by: NURSE PRACTITIONER

## 2023-01-25 PROCEDURE — 71045 X-RAY EXAM CHEST 1 VIEW: CPT

## 2023-01-25 PROCEDURE — 74011000250 HC RX REV CODE- 250: Performed by: NURSE PRACTITIONER

## 2023-01-25 PROCEDURE — 83735 ASSAY OF MAGNESIUM: CPT

## 2023-01-25 PROCEDURE — 74011636637 HC RX REV CODE- 636/637: Performed by: CLINICAL NURSE SPECIALIST

## 2023-01-25 PROCEDURE — 74011250637 HC RX REV CODE- 250/637: Performed by: HOSPITALIST

## 2023-01-25 PROCEDURE — 85025 COMPLETE CBC W/AUTO DIFF WBC: CPT

## 2023-01-25 RX ORDER — BUDESONIDE 0.5 MG/2ML
500 INHALANT ORAL
Status: DISCONTINUED | OUTPATIENT
Start: 2023-01-25 | End: 2023-01-26 | Stop reason: HOSPADM

## 2023-01-25 RX ORDER — INSULIN GLARGINE 100 [IU]/ML
16 INJECTION, SOLUTION SUBCUTANEOUS
Status: DISCONTINUED | OUTPATIENT
Start: 2023-01-25 | End: 2023-01-26 | Stop reason: HOSPADM

## 2023-01-25 RX ORDER — IPRATROPIUM BROMIDE AND ALBUTEROL SULFATE 2.5; .5 MG/3ML; MG/3ML
3 SOLUTION RESPIRATORY (INHALATION)
Status: DISCONTINUED | OUTPATIENT
Start: 2023-01-25 | End: 2023-01-26 | Stop reason: HOSPADM

## 2023-01-25 RX ORDER — GUAIFENESIN 600 MG/1
600 TABLET, EXTENDED RELEASE ORAL EVERY 12 HOURS
Status: DISCONTINUED | OUTPATIENT
Start: 2023-01-25 | End: 2023-01-26 | Stop reason: HOSPADM

## 2023-01-25 RX ADMIN — GUAIFENESIN 600 MG: 600 TABLET, EXTENDED RELEASE ORAL at 10:13

## 2023-01-25 RX ADMIN — Medication 4 UNITS: at 12:46

## 2023-01-25 RX ADMIN — CARVEDILOL 6.25 MG: 6.25 TABLET, FILM COATED ORAL at 10:23

## 2023-01-25 RX ADMIN — HEPARIN SODIUM 5000 UNITS: 5000 INJECTION INTRAVENOUS; SUBCUTANEOUS at 18:08

## 2023-01-25 RX ADMIN — NYSTATIN OINTMENT: 100000 OINTMENT TOPICAL at 18:10

## 2023-01-25 RX ADMIN — Medication 4 UNITS: at 10:13

## 2023-01-25 RX ADMIN — SODIUM CHLORIDE, PRESERVATIVE FREE 10 ML: 5 INJECTION INTRAVENOUS at 22:23

## 2023-01-25 RX ADMIN — SODIUM CHLORIDE, PRESERVATIVE FREE 10 ML: 5 INJECTION INTRAVENOUS at 07:09

## 2023-01-25 RX ADMIN — LEVOTHYROXINE SODIUM 100 MCG: 0.1 TABLET ORAL at 07:09

## 2023-01-25 RX ADMIN — LEVETIRACETAM 750 MG: 500 TABLET, FILM COATED ORAL at 22:06

## 2023-01-25 RX ADMIN — SODIUM CHLORIDE, PRESERVATIVE FREE 10 ML: 5 INJECTION INTRAVENOUS at 12:46

## 2023-01-25 RX ADMIN — GUAIFENESIN 400 MG: 100 SOLUTION ORAL at 10:13

## 2023-01-25 RX ADMIN — GUAIFENESIN 600 MG: 600 TABLET, EXTENDED RELEASE ORAL at 22:07

## 2023-01-25 RX ADMIN — LEVETIRACETAM 750 MG: 500 TABLET, FILM COATED ORAL at 10:12

## 2023-01-25 RX ADMIN — BUDESONIDE 500 MCG: 0.5 INHALANT RESPIRATORY (INHALATION) at 10:50

## 2023-01-25 RX ADMIN — HEPARIN SODIUM 5000 UNITS: 5000 INJECTION INTRAVENOUS; SUBCUTANEOUS at 10:15

## 2023-01-25 RX ADMIN — CARVEDILOL 6.25 MG: 6.25 TABLET, FILM COATED ORAL at 18:08

## 2023-01-25 RX ADMIN — INSULIN GLARGINE 16 UNITS: 100 INJECTION, SOLUTION SUBCUTANEOUS at 22:07

## 2023-01-25 RX ADMIN — HEPARIN SODIUM 5000 UNITS: 5000 INJECTION INTRAVENOUS; SUBCUTANEOUS at 00:44

## 2023-01-25 RX ADMIN — BUDESONIDE 500 MCG: 0.5 INHALANT RESPIRATORY (INHALATION) at 22:03

## 2023-01-25 RX ADMIN — NYSTATIN OINTMENT: 100000 OINTMENT TOPICAL at 10:24

## 2023-01-25 RX ADMIN — LACOSAMIDE 25 MG: 50 TABLET, FILM COATED ORAL at 10:12

## 2023-01-25 RX ADMIN — LACOSAMIDE 25 MG: 50 TABLET, FILM COATED ORAL at 22:06

## 2023-01-25 RX ADMIN — GUAIFENESIN 400 MG: 100 SOLUTION ORAL at 14:03

## 2023-01-25 RX ADMIN — Medication 4 UNITS: at 18:09

## 2023-01-25 NOTE — DIABETES MGMT
[facility]  [facility] HEALTH  DIABETES MANAGEMENT CONSULT    Consulted by  Albert Rocha NP  for advanced nursing evaluation and care for inpatient blood glucose management. Evaluation and Action Plan   Henry Sheldon is a 78year old female with Type 2 Diabetes who is admitted with HHS with coma and status epilepticus. She was intubated for airway protection and started on an insulin gtt. BG on admission was 1157, AG 12, urine with negative ketones. HHS was resolved and was transitioned off the insulin gtt with high dose Lantus Q12 and correctional humalog. Decadron 6mg x2 was given over the weekend for laryngeal edema, now off the [facility] and successfully extubated. BG this morning is 138. Given steroids out of system and eating, basal was weaned but fasting still below goal.  A1C is greater than 14% and patient tells me that she stopped her metformin due to side effects but never followed up with outpatient provider. She will need both inpatient and outpatient support for diabetes care. Inpatient BG goal is 140-180mg/dl. Management Rationale Action Plan   Medication   Basal needs Using 0.2 units/kg/D as fasting BG   below goal, elevated A1C Reduced total basal to 16 units Lantus HS   Bolus humalog Bolus humalog as eating. 4 units Humalog/meal   Corrective insulin Using normal sensitivity Normal sensitivity ACHS   Additional orders    Continue carb consistent diet, can adjust to 60g CHO/meal    Please encourage patient to participate in diabetes self care while in the hospital including allowing patient to   use lancet for blood glucose monitoring and self-administer insulin injections. Discharge Plan:   Would be ideal if patient could take basal/bolus insulin but she is not able to comprehend taking 4 injections daily. Would avoid 70/30 as she often skips meals or has inconsistent timing of her meals.  She is struggling with retaining   basic diabetes care instructions and is not able to independently simulate an insulin injection despite max support. I  f she is to discharge home, will need assistance for glucose monitoring and medication administration. I   have called her son, Kemar Jaffe, but not able to reach him via phone     Will need a FUV with PCP within 1-2 weeks after hospital discharge for ongoing diabetes management     On Discharge, order placed for \"diabetes education\". This will trigger a referral for the Program for Diabetes [facility] which includes outpatient diabetes self management training with a certified diabetes educator. Prescription for glucometer kit (Meter, Lancets (100), Strips (100)). Patient to obtain a blood glucose reading four times daily. First thing in the morning prior to eating and drinking anything then before lunch, dinner and bedtime. Create a log and present to PCP for interpretation. Lantus Solostar: At hospital based dose    Humalog Kwikpen: 5 units humalog with the largest meal of the day. 6. Pen Needle, Diabetic 32 Gauge x 5/32\" (1 box)            Initial Presentation   Thien Barakat is a 78 y.o. female who was transferred from [address] [place] [address] after being found down and unresponsive with tremors at home by family on 1/19/23. On arrival to the ED, she was seizing, ativan was given and intubated for airway protection. In the ED< her BP was 226/170, . LAB: UA: 1000+ glucosuria, , BG 1217, Creat 2.31, , , Alk Phos 533, Lac 2.4, trop 64. AG 12, GFR 22  CXR: normal cardiomediastinal silhouette. Right basilar atelectasis. Haziness left lung base likely due to body habitus/soft tissue attenuation. The osseous structures are unremarkable. Right basilar atelectasis. Probable soft tissue attenuation over the left lung base. Head CT: No large vessel occlusion. No acute pathology in the head and neck. Microvascular ischemic and other age-related changes.     HX: Type 2 Diabetes, HTN    INITIAL DX:   Seizures ([facility] [address] [R56.9] Current Treatment     TX: Insulin gtt, EEG    Hospital Course   Clinical progress has been uncomplicated. 1/19: ICU admission. Intubation   1/20: Neurology Consult: continuous EEG: suggestive of a mild-to-moderate generalized encephalopathic process, nonspecific in type. MRI when able. 1/21: Decadron x2 for laryngeal edema  1/22: Extubated  1/23: MRI negative for mass/hemorrhage or stroke  Diabetes History   Type 2 Diabetes: She tells me she has known about diabetes for less than 10 years  Ambulatory BG management provided by: PCP. She is not followed by her last PCP- looking for new provider  Family History Unknown    Diabetes-related Medical History  Acute complications  NK      Diabetes Medication History: Metformin 850mg BID. She tells me she stopped taking this about a year ago      Diabetes self-management practices:   Very vague with diet details  Breakfast: Skips  Lunch: Alexander  Dinner: \"whatever I can get\"  Snacks: Limited  Dessert: after dinner may have something sweet like a cookie  Drinks: 1 cup coffee in the am with a spoonful of sugar and creamer, Regular soda, water    Checking Sugar:  Does not have a glucometer kit at home    Exercise: Limited, no structured activity     Social Determinants of health: Concerned that she needs to know about diabetes. Overall: NOT achieving A1C goal with medication and self management practice     Lives with 2 sons and nephew  Per son patient was independent with cooking, cleaning and ADL's. She does not drive, family drives her to appointments  Family reports weight loss this month: Not able to tell me how much weight she has lost   reports pt won't take her diabetes medicine  No c/o polyuria, polydipsia   Subjective   \"I want to go home\"     Objective   Physical exam  General Overweight AA female sitting in bedside chair. Hoarse voice. Conversant and cooperative  Neuro  Awake, alert, oriented.    Vital Signs Visit Vitals  /67 (BP 1 Location: Right upper arm, BP Patient Position: At rest)   Pulse 93   Temp 99.8 °F (37.7 °C)   Resp 20   Ht 5' 3\" (1.6 m)   Wt 87.5 kg (192 lb 14.4 oz)   SpO2 97%   BMI 34.17 kg/m²     Skin  Warm and dry. Acanthosis noted along neckline. Heart   Regular rate and rhythm. No murmurs, rubs or gallops  Lungs  Clear to auscultation without rales or rhonchi  Extremities No foot wounds        Laboratory  Recent Labs     23  0419 23  0030 23  0411   * 66 158*   AGAP 5 5 3*   WBC 9.8 11.9* 15.6*   CREA 1.08* 1.12* 1.20*         Factors impacting BG management  Factor Dose Comments   Nutrition:   Carb consistent diet 60g CHO/meal    Drugs Decadron d/c    Infection? Low suspicion  Likely related to steroid use  WBC 15.6  Blood Cx  NGTD    Kidney function JORGE  GFR 51  CKD? Other:   Liver function   Elevated liver enzymes      Blood glucose pattern    Significant diabetes-related events over the past 24-72 hours  A1C >14%  Am labs: Na 136, , GFR 38   Off insulin gtt:  Fastin  Daytime B-147  Basal: 20 units Lantus HS  Bolus: 4 units Humalog/meal  Correction: 2 units   Decadron given x2 doses -    Possible d/c to SNF?  Or home with [facility]    Assessment and Nursing Intervention   Nursing Diagnosis Risk for unstable blood glucose pattern   Nursing Intervention Domain 5250 Decision-making Support   Nursing Interventions Examined current inpatient diabetes/blood glucose control   Explored factors facilitating and impeding inpatient management  Explored corrective strategies with patient and responsible inpatient provider   Informed patient of rational for insulin strategy while hospitalized       Nursing Diagnosis [address]9564 Ineffective Health Management   Nursing Intervention Domain 5250 Decision-makingSupport   Nursing Interventions Identified diabetes self-management practices impeding diabetes control  Discussed diabetes survival skills related to  Healthy Plate eating plan; given handouts  Role of physical activity in improving insulin sensitivity and action. Patient needed support with using an insulin demo pen. Procedure for blood glucose monitoring  Medications plan at discharge       Billing Code(s)   00877    Before making these care recommendations, I personally reviewed the hospitalization record, including notes, laboratory & diagnostic data and current medications, and examined the patient at the bedside (circumstances permitting) before determining care. More than fifty (50) percent of the time was spent in patient counseling and/or care coordination.   Total minutes: 25    MURALI Monroy  Diabetes Clinical Nurse Specialist  [facility]  Access via [address]

## 2023-01-25 NOTE — PROGRESS NOTES
[facility] Adult  Hospitalist Group                                                                                          Hospitalist Progress Note  Delfina Washington MD  Answering service: [phone] from in house phone        Date of Service:  2023  NAME:  Christi Tijerina  :  1945  MRN:  [id number]      Admission Summary:     Christi Tijerina is a 78 y.o. female initially presented to outside hospital due to  change mental status. Last know well time had been ~ 0800 am. Pt was found unresponsive at home with no evidence of trauma. + tremors and gaze was to the right. Glucose was 1157 lactic acid was 2.38. Loaded with keppra,  intubated and transferred to [facility] for further care . Patient is not a very good historian, unable to provide much history leading up to current events. She reports she stopped taking her diabetes medicine because her doctor gave her medicines that were making her blood sugar \"too high\". She admits to taking her blood sugar the morning of her seizure event and says it was ~1100. When asked about other blood sugars, she reports she does not know. She is unable to recount when the last time is that she went to her physician and again, states that he was giving her inappropriate medications. Hospitalist were consulted for medical management and to evaluate for downgrade to the medical floor. Patient was seen and examined, she was lying in bed in no acute distress, reports she has a mildly sore throat Denies headaches, dizziness, chest pain, chest pressure, shortness of breath. Denies any GI complaints such as nausea, vomiting, diarrhea or constipation and has no dysuria. Patient looks medically stable for downgrade to medical floor. Reports that she has had COVID vaccinations in addition to boosters  Reports she has had the flu vaccine    Interval history / Subjective:     Patient is seen and examined. Patient poor historian.  She said she feels better today. Assessment & Plan:     New onset Seizure:   - New onset, no prior history of seizure: Possibly due to severe hyperglycemia/HHS. - CT head negative  - on Keppra, VIMPAT  - EEG showed periodic appearing blunted sharp wave activity on the right, indicating a potential seizure focus but no electrographic or clinical seizures were noted. - MRI shows no acute intracranial process. Moderate chronic microvascular change and mild cerebral atrophy. No intracranial mass, hemorrhage or evidence of acute infarction. - seizure precatuion,   - Neurologist on board      HHS   - Occurring in the setting of poorly controlled DM2:   - DKA protocol insulin drip, has been transitioned to Lantus now with sliding scale insulin. - DM education has been ordered  - Hemoglobin A1c >14  - not sure if she can inject her insulin her self, family not available and would like to make sure whether they are able to monitor her fingerstick glucose and administer insulin on discharge at home     Toxic metabolic encephalopathy:   - Due to Seizure/HHS/Delirium  - Resolving  -Patient is conscious and alert, answers questions     Pseudohyponatremia:   - Corrected  when adjusted for glucose  - Continue to trend BMP     JORGE:   - likely due to prerenal state, resolving  - Received aggressive fluid resuscitation  - monitor renal function          Code status: Full Code  Prophylaxis: SCD  Care Plan discussed with: Patient, nurse and CM  Anticipated Disposition: Home with family and home health in 24-48 hours     Hospital Problems  Never Reviewed            Codes Class Noted POA    Seizures ([facility] [address] ICD-10-CM: R56.9  ICD-9-CM: 780.39  1/19/2023 Unknown         Vital Signs:    Last 24hrs VS reviewed since prior progress note.  Most recent are:  Visit Vitals  /74 (BP 1 Location: Right upper arm, BP Patient Position: At rest;Sitting)   Pulse 91   Temp 98.9 °F (37.2 °C)   Resp 20   Ht 5' 3\" (1.6 m)   Wt 87.5 kg (192 lb 14.4 oz)   SpO2 100%   BMI 34.17 kg/m²         Intake/Output Summary (Last 24 hours) at 1/25/2023 1510  Last data filed at 1/25/2023 1127  Gross per 24 hour   Intake 120 ml   Output 1000 ml   Net -880 ml          Physical Examination:     I had a face to face encounter with this patient and independently examined them on 1/25/2023 as outlined below:          Constitutional:  No acute distress, cooperative, pleasant    ENT:  Oral mucosa moist, oropharynx benign. Resp:  CTA bilaterally. No wheezing/rhonchi/rales. No accessory muscle use. CV:  Regular rhythm, normal rate, no murmurs, gallops, rubs    GI:  Soft, non distended, non tender. normoactive bowel sounds, no hepatosplenomegaly     Musculoskeletal:  No edema,     Neurologic:  Conscious and alert, oriented to place and person, Moves all extremities. Data Review:    Review and/or order of clinical lab test  Review and/or order of tests in the radiology section of CPT  Review and/or order of tests in the medicine section of CPT      Labs:     Recent Labs     01/25/23 0419 01/24/23 0030   WBC 9.8 11.9*   HGB 10.8* 10.1*   HCT 34.6* 31.1*    289       Recent Labs     01/25/23 0419 01/24/23 0030 01/23/23 0411    136 134*   K 4.0 3.6 4.1    103 101   CO2 25 28 30   BUN 17 18 20   CREA 1.08* 1.12* 1.20*   * 66 158*   CA 8.8 8.7 9.2   MG 1.6 2.0 2.3   PHOS 2.5* 2.4* 2.4*       No results for input(s): ALT, AP, TBIL, TBILI, TP, ALB, GLOB, GGT, AML, LPSE in the last 72 hours. No lab exists for component: SGOT, GPT, AMYP, HLPSE  No results for input(s): INR, PTP, APTT, INREXT, INREXT in the last 72 hours. No results for input(s): FE, TIBC, PSAT, FERR in the last 72 hours. No results found for: FOL, RBCF   No results for input(s): PH, PCO2, PO2 in the last 72 hours. No results for input(s): CPK, CKNDX, TROIQ in the last 72 hours.     No lab exists for component: CPKMB  No results found for: CHOL, CHOLX, CHLST, CHOLV, HDL, HDLP, LDL, LDLC, DLDLP, TGLX, TRIGL, TRIGP, CHHD, CHHDX  Lab Results   Component Value Date/Time    Glucose (POC) 129 (H) 01/25/2023 11:38 AM    Glucose (POC) 91 01/25/2023 08:15 AM    Glucose (POC) 147 (H) 01/24/2023 09:57 PM    Glucose (POC) 127 (H) 01/24/2023 04:51 PM    Glucose (POC) 181 (H) 01/24/2023 12:14 PM     Lab Results   Component Value Date/Time    Color YELLOW/STRAW 01/19/2023 04:40 PM    Appearance CLEAR 01/19/2023 04:40 PM    pH (UA) 5.5 01/19/2023 04:40 PM    Protein Negative 01/19/2023 04:40 PM    Glucose >1,000 (A) 01/19/2023 04:40 PM    Ketone Negative 01/19/2023 04:40 PM    Bilirubin Negative 01/19/2023 04:40 PM    Urobilinogen 0.2 01/19/2023 04:40 PM    Nitrites Negative 01/19/2023 04:40 PM    Leukocyte Esterase Negative 01/19/2023 04:40 PM    Epithelial cells FEW 01/19/2023 04:40 PM    Bacteria Negative 01/19/2023 04:40 PM    WBC 0-4 01/19/2023 04:40 PM    RBC 0-5 01/19/2023 04:40 PM         Medications Reviewed:     Current Facility-Administered Medications   Medication Dose Route Frequency    guaiFENesin ER (MUCINEX) tablet 600 mg  600 mg Oral Q12H    budesonide (PULMICORT) 500 mcg/2 ml nebulizer suspension  500 mcg Nebulization BID RT    albuterol-ipratropium (DUO-NEB) 2.5 MG-0.5 MG/3 ML  3 mL Nebulization Q4H PRN    insulin glargine (LANTUS) injection 16 Units  16 Units SubCUTAneous QHS    dextrose 10 % infusion 0-250 mL  0-250 mL IntraVENous PRN    insulin lispro (HUMALOG) injection 4 Units  0.05 Units/kg SubCUTAneous TID WITH MEALS    insulin lispro (HUMALOG) injection   SubCUTAneous AC&HS    levETIRAcetam (KEPPRA) tablet 750 mg  750 mg Oral Q12H    carvediloL (COREG) tablet 6.25 mg  6.25 mg Oral BID WITH MEALS    lacosamide (VIMPAT) tablet 25 mg  25 mg Oral Q12H    racEPINEPHrine (VAPONEFRIN) 2.25% nebulizer solution  0.5 mL Nebulization Q2H PRN    guaiFENesin (ROBITUSSIN) 100 mg/5 mL oral liquid 400 mg  400 mg Oral Q4H PRN    levothyroxine (SYNTHROID) tablet 100 mcg  100 mcg Per NG tube 6am    hydrALAZINE (APRESOLINE) 20 mg/mL injection 20 mg  20 mg IntraVENous Q6H PRN    polyvinyl alcohol-povidone (NATURAL TEARS) 0.5-0.6 % ophthalmic solution 1 Drop  1 Drop Both Eyes PRN    glucose chewable tablet 16 g  4 Tablet Oral PRN    glucagon (GLUCAGEN) injection 1 mg  1 mg IntraMUSCular PRN    dextrose 10 % infusion 0-250 mL  0-250 mL IntraVENous PRN    sodium chloride (NS) flush 5-40 mL  5-40 mL IntraVENous Q8H    sodium chloride (NS) flush 5-40 mL  5-40 mL IntraVENous PRN    acetaminophen (TYLENOL) tablet 650 mg  650 mg Oral Q6H PRN    Or    acetaminophen (TYLENOL) suppository 650 mg  650 mg Rectal Q6H PRN    polyethylene glycol (MIRALAX) packet 17 g  17 g Oral DAILY PRN    ondansetron (ZOFRAN ODT) tablet 4 mg  4 mg Oral Q8H PRN    Or    ondansetron (ZOFRAN) injection 4 mg  4 mg IntraVENous Q6H PRN    nystatin (MYCOSTATIN) 100,000 unit/gram ointment   Topical BID    heparin (porcine) injection 5,000 Units  5,000 Units SubCUTAneous Q8H     ______________________________________________________________________  EXPECTED LENGTH OF STAY: 4d 9h  ACTUAL LENGTH OF STAY:          6                 Ashley Floyd MD

## 2023-01-25 NOTE — PROGRESS NOTES
Bedside and Verbal shift change report given to TAQUERIA Dillard (oncoming nurse) by Easton Kebede (offgoing nurse). Report included the following information SBAR, Kardex, Intake/Output, MAR, Recent Results, Cardiac Rhythm NSR, and Dual Neuro Assessment.

## 2023-01-25 NOTE — PROGRESS NOTES
Problem: Pressure Injury - Risk of  Goal: *Prevention of pressure injury  Description: Document Calvin Scale and appropriate interventions in the flowsheet. Outcome: Progressing Towards Goal  Note: Pressure Injury Interventions:  Sensory Interventions: Keep linens dry and wrinkle-free, Float heels, Avoid rigorous massage over bony prominences    Moisture Interventions: Absorbent underpads, Apply protective barrier, creams and emollients, Assess need for specialty bed, Check for incontinence Q2 hours and as needed, Internal/External urinary devices, Maintain skin hydration (lotion/cream), Moisture barrier, Offer toileting Q_hr    Activity Interventions: Assess need for specialty bed, Increase time out of bed, PT/OT evaluation    Mobility Interventions: Assess need for specialty bed, Float heels, HOB 30 degrees or less, PT/OT evaluation, Turn and reposition approx. every two hours(pillow and wedges)    Nutrition Interventions: Document food/fluid/supplement intake, Discuss nutritional consult with provider, Offer support with meals,snacks and hydration    Friction and Shear Interventions: Apply protective barrier, creams and emollients, Feet elevated on foot rest, Foam dressings/transparent film/skin sealants, HOB 30 degrees or less, Lift sheet, Minimize layers, Sit at 90-degree angle, Transferring/repositioning devices                Problem: Patient Education: Go to Patient Education Activity  Goal: Patient/Family Education  Outcome: Progressing Towards Goal     Problem: Falls - Risk of  Goal: *Absence of Falls  Description: Document Yuri Nance Fall Risk and appropriate interventions in the flowsheet.   Outcome: Progressing Towards Goal  Note: Fall Risk Interventions:            Medication Interventions: Evaluate medications/consider consulting pharmacy    Elimination Interventions: Call light in reach, Toileting schedule/hourly rounds    History of Falls Interventions: Door open when patient unattended, Room close to nurse's station, Evaluate medications/consider consulting pharmacy         Problem: Patient Education: Go to Patient Education Activity  Goal: Patient/Family Education  Outcome: Progressing Towards Goal     Problem: Nutrition Deficit  Goal: *Optimize nutritional status  Outcome: Progressing Towards Goal     Problem: Diabetes Self-Management  Goal: *Disease process and treatment process  Description: Define diabetes and identify own type of diabetes; list 3 options for treating diabetes. Outcome: Progressing Towards Goal  Goal: *Incorporating nutritional management into lifestyle  Description: Describe effect of type, amount and timing of food on blood glucose; list 3 methods for planning meals. Outcome: Progressing Towards Goal  Goal: *Incorporating physical activity into lifestyle  Description: State effect of exercise on blood glucose levels. Outcome: Progressing Towards Goal  Goal: *Developing strategies to promote health/change behavior  Description: Define the ABC's of diabetes; identify appropriate screenings, schedule and personal plan for screenings. Outcome: Progressing Towards Goal  Goal: *Using medications safely  Description: State effect of diabetes medications on diabetes; name diabetes medication taking, action and side effects. Outcome: Progressing Towards Goal  Goal: *Monitoring blood glucose, interpreting and using results  Description: Identify recommended blood glucose targets  and personal targets. Outcome: Progressing Towards Goal  Goal: *Prevention, detection, treatment of acute complications  Description: List symptoms of hyper- and hypoglycemia; describe how to treat low blood sugar and actions for lowering  high blood glucose level.   Outcome: Progressing Towards Goal  Goal: *Prevention, detection and treatment of chronic complications  Description: Define the natural course of diabetes and describe the relationship of blood glucose levels to long term complications of diabetes.   Outcome: Progressing Towards Goal  Goal: *Developing strategies to address psychosocial issues  Description: Describe feelings about living with diabetes; identify support needed and support network  Outcome: Progressing Towards Goal     Problem: Patient Education: Go to Patient Education Activity  Goal: Patient/Family Education  Outcome: Progressing Towards Goal     Problem: Seizure Disorder (Adult)  Goal: *STG: Remains free of seizure activity  Outcome: Progressing Towards Goal  Goal: *STG: Maintains lab values within therapeutic range  Outcome: Progressing Towards Goal  Goal: *STG/LTG: Complies with medication therapy  Outcome: Progressing Towards Goal  Goal: *STG: Remains free of injury during seizure activity  Outcome: Progressing Towards Goal  Goal: *STG: Remains safe in hospital  Outcome: Progressing Towards Goal  Goal: Interventions  Outcome: Progressing Towards Goal     Problem: Patient Education: Go to Patient Education Activity  Goal: Patient/Family Education  Outcome: Progressing Towards Goal

## 2023-01-25 NOTE — PROGRESS NOTES
Problem: Mobility Impaired (Adult and Pediatric)  Goal: *Acute Goals and Plan of Care (Insert Text)  Description: FUNCTIONAL STATUS PRIOR TO ADMISSION: Patient was independent and active without use of DME.    HOME SUPPORT PRIOR TO ADMISSION: Pt lives with her son but did not require assistance. Physical Therapy Goals  Initiated 1/23/2023  1. Patient will move from supine to sit and sit to supine, scoot up and down, and roll side to side in bed with Renetta within 7 day(s). 2.  Patient will transfer from bed to chair and chair to bed with Renetta using the least restrictive device within 7 day(s). 3.  Patient will perform sit to stand with Renetta within 7 day(s). 4.  Patient will ambulate with modA for 25 feet with the least restrictive device within 7 day(s). Outcome: Progressing Towards Goal  PHYSICAL THERAPY TREATMENT  Patient: Maxine Chong (56 y.o. female)  Date: 1/25/2023  Diagnosis: Seizures ([facility] [R56.9] <principal problem not specified>      Precautions: Fall  Chart, physical therapy assessment, plan of care and goals were reviewed. ASSESSMENT  Patient continues with skilled PT services and is progressing towards goals. Pt presents with generalized weakness, impaired coordination B hands, impaired balance, decreased activity tolerance, decreased insight into deficits, and overall decline in functional mobility compared to her independent baseline function. This date, pt transferred supine<>sit with CGA and additional time. She transferred sit<>stand and ambulated short distance (10 ft x 2 reps) with Renetta x2. Trialled ambulation with and without RW as pt initially relied heavily on HHA. Pt required max VCs as well as physical assistance for RW management. Pt with poor safety awareness using RW and demonstrated little carryover of education on proper positioning and sequencing. Ended session in chair with all needs met. Continuing to recommend IPR upon discharge.   If d/c home, will benefit from [facility] PT and will require assistance with all OOB mobility. Current Level of Function Impacting Discharge (mobility/balance): Renetta x2 for transfers and ambulating short distance    Other factors to consider for discharge: fall risk, independent baseline (carrying for grandson), poor insight into deficits and safety awareness         PLAN :  Patient continues to benefit from skilled intervention to address the above impairments. Continue treatment per established plan of care. to address goals. Recommendation for discharge: (in order for the patient to meet his/her long term goals)  Therapy 3 hours per day 5-7 days per week   If d/c home, will benefit from [facility] PT and will require assistance with all OOB mobility    This discharge recommendation:  Has not yet been discussed the attending provider and/or case management    IF patient discharges home will need the following DME: BSC       SUBJECTIVE:   Patient stated I want to go home. I'll walk home if I have to.  pt perseverative and required redirection     OBJECTIVE DATA SUMMARY:   Critical Behavior:  Neurologic State: Alert, Eyes open spontaneously  Orientation Level: Oriented X4  Cognition: Appropriate decision making, Appropriate for age attention/concentration, Appropriate safety awareness, Follows commands  Safety/Judgement: Decreased awareness of need for assistance, Decreased insight into deficits, Fall prevention  Functional Mobility Training:  Bed Mobility:  Supine to Sit: Contact guard assistance  Sit to Supine: Contact guard assistance    Transfers:  Sit to Stand: Minimum assistance;Assist x2  Stand to Sit: Minimum assistance;Assist x2    Balance:  Sitting: Impaired  Sitting - Static: Fair (occasional)  Sitting - Dynamic: Fair (occasional)  Standing: Impaired; With support  Standing - Static: Poor;Constant support  Standing - Dynamic : Poor;Constant support    Ambulation/Gait Training:  Distance (ft): 10 Feet (ft)  Assistive Device: Gait belt;Walker, rolling (trialled with and without RW)  Ambulation - Level of Assistance: Minimal assistance;Assist x2  Gait Abnormalities: Decreased step clearance;Shuffling gait;Trunk sway increased  Base of Support: Narrowed  Stance: Left decreased  Speed/Lizbeth: Slow;Shuffled  Step Length: Right shortened;Left shortened    Activity Tolerance:   Fair    After treatment patient left in no apparent distress:   Sitting in chair, Call bell within reach, and Bed / chair alarm activated    COMMUNICATION/COLLABORATION:   The patients plan of care was discussed with: Occupational therapist and Registered nurse.      Teagan Wang PT, DPT   Time Calculation: 26 mins

## 2023-01-25 NOTE — PROGRESS NOTES
Transition of Care Plan: home with home health (patient refusing rehab)  Arlyn Meeks patient     RUR: 9% low     PCP F/U:Dr Jammie Galloway     Disposition: home with home health      Transportation: family     Main Contact: Jeffery Anand: [phone]  Dhruv Garcia [phone]    1505: Attempted to reach patient's sons again via telephone call. Left message to return this CM's call. Another relative is suppose to come by later this evening. Will ask RN to obtain contact number. 1533: Notified that two relatives were in patient's room. However, relatives were goddaugher and godson.  They are not staying long and  was notified that the patient's son will be coming later this evening    Vannessa Brambila RN, CRM

## 2023-01-25 NOTE — PROGRESS NOTES
Problem: Self Care Deficits Care Plan (Adult)  Goal: *Acute Goals and Plan of Care (Insert Text)  Description: FUNCTIONAL STATUS PRIOR TO ADMISSION: Patient was independent and active without use of DME. Patient was independent for basic and instrumental ADLs. HOME SUPPORT: The patient lived with her son but did not require assist.    Occupational Therapy Goals  Initiated 1/23/2023  1. Patient will perform grooming in standing with supervision/set-up within 7 day(s). 2.  Patient will perform lower body dressing with minimal assistance/contact guard assist within 7 day(s). 3.  Patient will perform self-feeding with modified independence within 7 day(s). 4.  Patient will perform toilet transfers with supervision/set-up within 7 day(s). 5.  Patient will perform all aspects of toileting with minimal assistance/contact guard assist within 7 day(s). 6.  Patient will participate in upper extremity therapeutic exercise/activities with supervision/set-up for 10 minutes within 7 day(s). 7.  Patient will utilize energy conservation techniques during functional activities with verbal and visual cues within 7 day(s). Outcome: Progressing Towards Goal   OCCUPATIONAL THERAPY TREATMENT  Patient: Alba Gonzalez (89 y.o. female)  Date: 1/25/2023  Diagnosis: [facility] ([facility] [R56.9] <principal problem not specified>      Precautions: Fall  Chart, occupational therapy assessment, plan of care, and goals were reviewed. ASSESSMENT  Patient continues with skilled OT services and is progressing towards goals. Patient presents this session with improving bilateral upper extremities use, able to hold RW during mobility, improving but limited coordination, fair lower extremity access, bowel incontinence, and continued need for multi modal cueing for safety during mobility/transfers. Patient received semi supine in bed and agreeable to working with therapy.  Patient transferred to sitting edge of bed and noted BM in bed, patient reporting inability to tell when soiled. Patient stood and ambulating to commode in room, able to void and have BM. Completed alex and bowel care in sitting with setup assist and min A to adjust briefs. Soiled brief doffed and clean brief donned once sitting in recliner. Used RW for mobility this date but patient requiring physical assist to manage and near constant verbal cueing to remain close to walker, remains impulsive during mobility. Patient with limited insight into deficits and poor safety awareness and remains a high fall risk at this time. Patient would benefit from skilled OT services during admission to improve independence with self care and functional mobility/transfers. Recommend [facility] to [facility] at this time. Current Level of Function Impacting Discharge (ADLs): CGA to min A x2 for mobility/transfers, CGA to mod A for self care tasks    Other factors to consider for discharge: prior level of function independent, supportive family, limited insight into deficits, high fall risk         PLAN :  Patient continues to benefit from skilled intervention to address the above impairments. Continue treatment per established plan of care to address goals. Recommend with staff: up to chair 3x/day for meals, functional mobility to and from commode for toileting, discussed with RN    Recommend next OT session: continue POC    Recommendation for discharge: (in order for the patient to meet his/her long term goals)  Therapy 3 hours per day 5-7 days per week    This discharge recommendation:  Has been made in collaboration with the attending provider and/or case management    IF patient discharges home will need the following DME: TBD pending progress       SUBJECTIVE:   Patient stated I could get to the bathroom by myself.  Re-oriented to need to call for nursing staff.     OBJECTIVE DATA SUMMARY:   Cognitive/Behavioral Status:  Neurologic State: Alert  Orientation Level: Oriented X4  Cognition: Appropriate decision making; Appropriate for age attention/concentration; Appropriate safety awareness; Follows commands             Functional Mobility and Transfers for ADLs:  Bed Mobility:  Supine to Sit: Contact guard assistance  Sit to Supine: Contact guard assistance  Scooting: Contact guard assistance    Transfers:  Sit to Stand: Minimum assistance;Assist x2  Functional Transfers  Toilet Transfer : Minimum assistance;Assist x2  Bed to Chair: Minimum assistance;Assist x2    Balance:  Sitting: Impaired  Sitting - Static: Fair (occasional)  Sitting - Dynamic: Fair (occasional)  Standing: Impaired; With support  Standing - Static: Poor;Constant support  Standing - Dynamic : Poor;Constant support    ADL Intervention:       Grooming  Position Performed: Standing  Washing Hands: Contact guard assistance                        Lower Body Dressing Assistance  Protective Undergarmet: Moderate assistance (to don new brief)    Toileting  Bladder Hygiene: Set-up  Bowel Hygiene: Set-up (in sitting)  Clothing Management: Minimum assistance  Cues: Verbal cues provided           Pain:  Denied pain    Activity Tolerance:   Fair, SpO2 stable on RA, and requires rest breaks    After treatment patient left in no apparent distress:   Sitting in chair, Call bell within reach, and Bed / chair alarm activated    COMMUNICATION/COLLABORATION:   The patients plan of care was discussed with: Physical therapist and Registered nurse.      Gabino Esposito OTR/L  Time Calculation: 24 mins

## 2023-01-25 NOTE — PROGRESS NOTES
Patient's family's contacts updated in chart. Patient states she lives with Brady Wilson but other son lives on the same street.

## 2023-01-26 VITALS
TEMPERATURE: 98 F | OXYGEN SATURATION: 100 % | BODY MASS INDEX: 34.18 KG/M2 | HEART RATE: 79 BPM | SYSTOLIC BLOOD PRESSURE: 150 MMHG | WEIGHT: 192.9 LBS | RESPIRATION RATE: 18 BRPM | DIASTOLIC BLOOD PRESSURE: 80 MMHG | HEIGHT: 63 IN

## 2023-01-26 LAB
ANION GAP SERPL CALC-SCNC: 5 MMOL/L (ref 5–15)
BACTERIA SPEC CULT: NORMAL
BASOPHILS # BLD: 0.1 K/UL (ref 0–0.1)
BASOPHILS NFR BLD: 1 % (ref 0–1)
BUN SERPL-MCNC: 13 MG/DL (ref 6–20)
BUN/CREAT SERPL: 13 (ref 12–20)
CALCIUM SERPL-MCNC: 8.6 MG/DL (ref 8.5–10.1)
CHLORIDE SERPL-SCNC: 108 MMOL/L (ref 97–108)
CO2 SERPL-SCNC: 24 MMOL/L (ref 21–32)
CREAT SERPL-MCNC: 1.04 MG/DL (ref 0.55–1.02)
DIFFERENTIAL METHOD BLD: ABNORMAL
EOSINOPHIL # BLD: 0.2 K/UL (ref 0–0.4)
EOSINOPHIL NFR BLD: 2 % (ref 0–7)
ERYTHROCYTE [DISTWIDTH] IN BLOOD BY AUTOMATED COUNT: 13.1 % (ref 11.5–14.5)
GLUCOSE BLD STRIP.AUTO-MCNC: 132 MG/DL (ref 65–117)
GLUCOSE BLD STRIP.AUTO-MCNC: 154 MG/DL (ref 65–117)
GLUCOSE SERPL-MCNC: 129 MG/DL (ref 65–100)
HCT VFR BLD AUTO: 33.6 % (ref 35–47)
HGB BLD-MCNC: 10.7 G/DL (ref 11.5–16)
IMM GRANULOCYTES # BLD AUTO: 0.2 K/UL (ref 0–0.04)
IMM GRANULOCYTES NFR BLD AUTO: 2 % (ref 0–0.5)
LYMPHOCYTES # BLD: 2.6 K/UL (ref 0.8–3.5)
LYMPHOCYTES NFR BLD: 28 % (ref 12–49)
MCH RBC QN AUTO: 28.2 PG (ref 26–34)
MCHC RBC AUTO-ENTMCNC: 31.8 G/DL (ref 30–36.5)
MCV RBC AUTO: 88.7 FL (ref 80–99)
MONOCYTES # BLD: 1.2 K/UL (ref 0–1)
MONOCYTES NFR BLD: 13 % (ref 5–13)
NEUTS SEG # BLD: 4.9 K/UL (ref 1.8–8)
NEUTS SEG NFR BLD: 54 % (ref 32–75)
NRBC # BLD: 0 K/UL (ref 0–0.01)
NRBC BLD-RTO: 0 PER 100 WBC
PLATELET # BLD AUTO: 318 K/UL (ref 150–400)
PMV BLD AUTO: 10.2 FL (ref 8.9–12.9)
POTASSIUM SERPL-SCNC: 4.1 MMOL/L (ref 3.5–5.1)
RBC # BLD AUTO: 3.79 M/UL (ref 3.8–5.2)
RBC MORPH BLD: ABNORMAL
SERVICE CMNT-IMP: ABNORMAL
SERVICE CMNT-IMP: ABNORMAL
SERVICE CMNT-IMP: NORMAL
SODIUM SERPL-SCNC: 137 MMOL/L (ref 136–145)
WBC # BLD AUTO: 9.2 K/UL (ref 3.6–11)

## 2023-01-26 PROCEDURE — 94640 AIRWAY INHALATION TREATMENT: CPT

## 2023-01-26 PROCEDURE — 74011250637 HC RX REV CODE- 250/637: Performed by: NURSE PRACTITIONER

## 2023-01-26 PROCEDURE — 82962 GLUCOSE BLOOD TEST: CPT

## 2023-01-26 PROCEDURE — 74011636637 HC RX REV CODE- 636/637: Performed by: CLINICAL NURSE SPECIALIST

## 2023-01-26 PROCEDURE — 80048 BASIC METABOLIC PNL TOTAL CA: CPT

## 2023-01-26 PROCEDURE — 74011250637 HC RX REV CODE- 250/637: Performed by: PSYCHIATRY & NEUROLOGY

## 2023-01-26 PROCEDURE — 74011250636 HC RX REV CODE- 250/636: Performed by: NURSE PRACTITIONER

## 2023-01-26 PROCEDURE — 85025 COMPLETE CBC W/AUTO DIFF WBC: CPT

## 2023-01-26 PROCEDURE — 74011250637 HC RX REV CODE- 250/637: Performed by: HOSPITALIST

## 2023-01-26 PROCEDURE — 99231 SBSQ HOSP IP/OBS SF/LOW 25: CPT | Performed by: CLINICAL NURSE SPECIALIST

## 2023-01-26 PROCEDURE — 36415 COLL VENOUS BLD VENIPUNCTURE: CPT

## 2023-01-26 PROCEDURE — 74011000250 HC RX REV CODE- 250: Performed by: HOSPITALIST

## 2023-01-26 PROCEDURE — 74011000250 HC RX REV CODE- 250: Performed by: NURSE PRACTITIONER

## 2023-01-26 RX ORDER — LEVETIRACETAM 750 MG/1
750 TABLET ORAL EVERY 12 HOURS
Qty: 60 TABLET | Refills: 0 | Status: SHIPPED | OUTPATIENT
Start: 2023-01-26

## 2023-01-26 RX ORDER — LOSARTAN POTASSIUM 100 MG/1
50 TABLET ORAL DAILY
Qty: 30 TABLET | Refills: 0 | Status: SHIPPED
Start: 2023-01-26

## 2023-01-26 RX ORDER — INSULIN LISPRO 100 [IU]/ML
5 INJECTION, SOLUTION INTRAVENOUS; SUBCUTANEOUS DAILY
Qty: 1 ADJUSTABLE DOSE PRE-FILLED PEN SYRINGE | Refills: 0 | Status: SHIPPED | OUTPATIENT
Start: 2023-01-26

## 2023-01-26 RX ORDER — IBUPROFEN 200 MG
CAPSULE ORAL
Qty: 100 STRIP | Refills: 0 | Status: SHIPPED | OUTPATIENT
Start: 2023-01-26

## 2023-01-26 RX ORDER — INSULIN GLARGINE 100 [IU]/ML
16 INJECTION, SOLUTION SUBCUTANEOUS
Qty: 1 ADJUSTABLE DOSE PRE-FILLED PEN SYRINGE | Refills: 1 | Status: SHIPPED | OUTPATIENT
Start: 2023-01-26

## 2023-01-26 RX ORDER — PEN NEEDLE, DIABETIC 31 GX3/16"
NEEDLE, DISPOSABLE MISCELLANEOUS
Qty: 1 PEN NEEDLE | Refills: 1 | Status: SHIPPED | OUTPATIENT
Start: 2023-01-26

## 2023-01-26 RX ORDER — INSULIN PUMP SYRINGE, 3 ML
EACH MISCELLANEOUS
Qty: 1 KIT | Refills: 0 | Status: SHIPPED | OUTPATIENT
Start: 2023-01-26

## 2023-01-26 RX ADMIN — LEVOTHYROXINE SODIUM 100 MCG: 0.1 TABLET ORAL at 06:36

## 2023-01-26 RX ADMIN — Medication 4 UNITS: at 08:37

## 2023-01-26 RX ADMIN — LEVETIRACETAM 750 MG: 500 TABLET, FILM COATED ORAL at 08:26

## 2023-01-26 RX ADMIN — NYSTATIN OINTMENT: 100000 OINTMENT TOPICAL at 09:00

## 2023-01-26 RX ADMIN — HEPARIN SODIUM 5000 UNITS: 5000 INJECTION INTRAVENOUS; SUBCUTANEOUS at 08:27

## 2023-01-26 RX ADMIN — GUAIFENESIN 600 MG: 600 TABLET, EXTENDED RELEASE ORAL at 08:26

## 2023-01-26 RX ADMIN — Medication 4 UNITS: at 13:10

## 2023-01-26 RX ADMIN — HEPARIN SODIUM 5000 UNITS: 5000 INJECTION INTRAVENOUS; SUBCUTANEOUS at 01:04

## 2023-01-26 RX ADMIN — GUAIFENESIN 400 MG: 100 SOLUTION ORAL at 01:05

## 2023-01-26 RX ADMIN — BUDESONIDE 500 MCG: 0.5 INHALANT RESPIRATORY (INHALATION) at 10:30

## 2023-01-26 RX ADMIN — LACOSAMIDE 25 MG: 50 TABLET, FILM COATED ORAL at 08:27

## 2023-01-26 RX ADMIN — Medication 2 UNITS: at 13:09

## 2023-01-26 RX ADMIN — HYDRALAZINE HYDROCHLORIDE 20 MG: 20 INJECTION INTRAMUSCULAR; INTRAVENOUS at 05:59

## 2023-01-26 RX ADMIN — CARVEDILOL 6.25 MG: 6.25 TABLET, FILM COATED ORAL at 08:27

## 2023-01-26 RX ADMIN — SODIUM CHLORIDE, PRESERVATIVE FREE 10 ML: 5 INJECTION INTRAVENOUS at 06:00

## 2023-01-26 NOTE — PROGRESS NOTES
[facility] [address] [facility]     RUR: 9% low     PCP F/U:Dr Sonia Anderson     Disposition: home with Pending sale to [facility] [address]       Transportation: patient's son     Main Contact: Nick Cody: [phone]  Gamaliel NASSARL-[phone]: Spoke to sons today regarding recommendation for a rehab facility. Both sons state if patient wants to go home, which she does, that they will take her home. State that they will provide extra support for her when she is home and help her take her medication. Will continue to follow    [phone]: [facility] accepted patient for home health services. AVS updated. Johnie Drake RN, CRM    The program assesses the family and/or caregiver's readiness, willingness, and ability to provide or support and self-management activities for the patient as needed. Transition of Care Plan:     The Plan for Transition of Care is related to the following treatment goals: mass home health referrals     The Patient and/or patient representative was provided with a choice of provider and agrees  with the discharge plan. Yes [x] No []    A Freedom of choice list was provided with basic dialogue that supports the patient's individualized plan of care/goals and shares the quality data associated with the providers.        Yes [x] No []

## 2023-01-26 NOTE — PROGRESS NOTES
Occupational Therapy  01/26/23     Patient currently on OT caseload. OT tx attempted at 10:02 AM however patient reports recently getting back to bed after sitting up in chair, reports being cold and declining out of bed with therapy at this time. Provided patient with warm blanket. Will continue to follow patient, of note, patient continues to decline rehab placement at this time and sons reporting increased family assist at discharge, recommend HHOT at minimum at discharge.     Thank you,  Myriam Garcia, OTR/L

## 2023-01-26 NOTE — DIABETES MGMT
[facility]  PROGRAM FOR DIABETES HEALTH  DIABETES MANAGEMENT CONSULT    Consulted by  Tina Rangel NP  for advanced nursing evaluation and care for inpatient blood glucose management. Evaluation and Action Plan   Deana Coleman is a 78year old female with Type 2 Diabetes who is admitted with HHS with coma and status epilepticus. She was intubated for airway protection and started on an insulin gtt. BG on admission was 1157, AG 12, urine with negative ketones. HHS was resolved and was transitioned off the insulin gtt with high dose Lantus Q12 and correctional humalog. Decadron 6mg x2 was given over the weekend for laryngeal edema, now off the [facility] and successfully extubated. BG this morning is 138. Given steroids out of system and eating, basal was weaned but fasting still below goal.  A1C is greater than 14% and patient tells me that she stopped her metformin due to side effects but never followed up with outpatient provider. She will need both inpatient and outpatient support for diabetes care. Inpatient BG goal is 140-180mg/dl. Management Rationale Action Plan   Medication   Basal needs Using 0.2 units/kg/D as fasting BG   below goal, elevated A1C Continue basal at 16 units Lantus HS   Bolus humalog Bolus humalog as eating. 4 units Humalog/meal   Corrective insulin Using normal sensitivity Normal sensitivity ACHS   Additional orders    Continue carb consistent diet, can adjust to 60g CHO/meal    Please encourage patient to participate in diabetes self care while in the hospital including allowing patient to   use lancet for blood glucose monitoring and self-administer insulin injections. Discharge Plan:   Would be ideal if patient could take basal/bolus insulin but she is not able to comprehend taking 4 injections daily. Would avoid 70/30 as she often skips meals or has inconsistent timing of her meals.  She is struggling with retaining   basic diabetes care instructions and is not able to independently simulate an insulin injection despite max support. I discussed home plan with son at bedside who was able to assist with cares     Will need a FUV with PCP within 1-2 weeks after hospital discharge for ongoing diabetes management     On Discharge, order placed for \"diabetes education\". This will trigger a referral for the Program for Diabetes Health which includes outpatient diabetes self management training with a certified diabetes educator. Prescription for glucometer kit (Meter, Lancets (100), Strips (100)). Patient to obtain a blood glucose reading four times daily. First thing in the morning prior to eating and drinking anything then before lunch, dinner and bedtime. Create a log and present to PCP for interpretation. Lantus Solostar: 16 units daily    Humalog Kwikpen: 5 units humalog with the largest meal of the day. 6. Pen Needle, Diabetic 32 Gauge x 5/32\" (1 box)            Initial Presentation   Mercy Nelson is a 78 y.o. female who was transferred from [facility] after being found down and unresponsive with tremors at home by family on 1/19/23. On arrival to the ED, she was seizing, ativan was given and intubated for airway protection. In the ED< her BP was 226/170, . LAB: UA: 1000+ glucosuria, , BG 1217, Creat 2.31, , , Alk Phos 533, Lac 2.4, trop 64. AG 12, GFR 22  CXR: normal cardiomediastinal silhouette. Right basilar atelectasis. Haziness left lung base likely due to body habitus/soft tissue attenuation. The osseous structures are unremarkable. Right basilar atelectasis. Probable soft tissue attenuation over the left lung base. Head CT: No large vessel occlusion. No acute pathology in the head and neck. Microvascular ischemic and other age-related changes. HX: Type 2 Diabetes, HTN    INITIAL DX:   Seizures ([address] [R56.9]     Current Treatment     TX: Insulin gtt, EEG    Hospital Course   Clinical progress has been uncomplicated. 1/19: ICU admission. Intubation   1/20: Neurology Consult: continuous EEG: suggestive of a mild-to-moderate generalized encephalopathic process, nonspecific in type. MRI when able. 1/21: Decadron x2 for laryngeal edema  1/22: Extubated  1/23: MRI negative for mass/hemorrhage or stroke  Diabetes History   Type 2 Diabetes: She tells me she has known about diabetes for less than 10 years  Ambulatory BG management provided by: PCP. She is not followed by her last PCP- looking for new provider  Family History Unknown    Diabetes-related Medical History  Acute complications  HHNK      Diabetes Medication History: Metformin 850mg BID. She tells me she stopped taking this about a year ago      Diabetes self-management practices:   Very vague with diet details  Breakfast: Skips  Lunch: [place]ina  Dinner: \"whatever I can get\"  Snacks: Limited  Dessert: after dinner may have something sweet like a cookie  Drinks: 1 cup coffee in the am with a spoonful of sugar and creamer, Regular soda, water    Checking Sugar:  Does not have a glucometer kit at home    Exercise: Limited, no structured activity     Social Determinants of health: Concerned that she needs to know about diabetes. Overall: NOT achieving A1C goal with medication and self management practice     Lives with 2 sons and nephew  Per son patient was independent with cooking, cleaning and ADL's. She does not drive, family drives her to appointments  Family reports weight loss this month: Not able to tell me how much weight she has lost   reports pt won't take her diabetes medicine  No c/o polyuria, polydipsia   Subjective   \"I am going home\"     Objective   Physical exam  General Overweight AA female sitting in bedside chair. Hoarse voice. Conversant and cooperative  Neuro  Awake, alert, oriented.    Vital Signs Visit Vitals  BP (!) 150/80 (BP 1 Location: Right upper arm, BP Patient Position: Lying)   Pulse 79   Temp 98 °F (36.7 °C)   Resp 18   Ht 5' 3\" (1.6 m)   Wt 87.5 kg (192 lb 14.4 oz)   SpO2 100%   BMI 34.17 kg/m²     Skin  Warm and dry. Acanthosis noted along neckline. Heart   Regular rate and rhythm. No murmurs, rubs or gallops  Lungs  Clear to auscultation without rales or rhonchi  Extremities No foot wounds        Laboratory  Recent Labs     23  0007 23  0419 23  0030   * 120* 66   AGAP 5 5 5   WBC 9.2 9.8 11.9*   CREA 1.04* 1.08* 1.12*         Factors impacting BG management  Factor Dose Comments   Nutrition:   Carb consistent diet 60g CHO/meal    Drugs Decadron d/c    Infection? Low suspicion  Likely related to steroid use  WBC 15.6  Blood Cx  NGTD    Kidney function JORGE  GFR 51  CKD? Other:   Liver function   Elevated liver enzymes      Blood glucose pattern    Significant diabetes-related events over the past 24-72 hours  A1C >14%  Am labs: Na 136, , GFR 38   Off insulin gtt:  Fastin  Daytime B-155  Basal: 16 units Lantus HS  Bolus: 4 units Humalog/meal  Correction: 2 units   Decadron given x2 doses -    D/C home with [facility]    Assessment and Nursing Intervention   Nursing Diagnosis Risk for unstable blood glucose pattern   Nursing Intervention Domain 5250 Decision-making Support   Nursing Interventions Examined current inpatient diabetes/blood glucose control   Explored factors facilitating and impeding inpatient management  Explored corrective strategies with patient and responsible inpatient provider   Informed patient of rational for insulin strategy while hospitalized       Nursing Diagnosis 68277 Ineffective Health Management   Nursing Intervention Domain 5250 Decision-makingSupport   Nursing Interventions Identified diabetes self-management practices impeding diabetes control  Discussed diabetes survival skills related to  Healthy Plate eating plan; given handouts  Role of physical activity in improving insulin sensitivity and action.   Patient needed support with using an insulin demo pen.  Procedure for blood glucose monitoring  Medications plan at discharge       Billing Code(s)   91042    Before making these care recommendations, I personally reviewed the hospitalization record, including notes, laboratory & diagnostic data and current medications, and examined the patient at the bedside (circumstances permitting) before determining care. More than fifty (50) percent of the time was spent in patient counseling and/or care coordination.   Total minutes: 25    MURALI Santos  Diabetes Clinical Nurse Specialist  Program for Diabetes Health  Access via Marquee

## 2023-01-26 NOTE — DISCHARGE SUMMARY
Discharge Summary     Patient:  Pacheco Russo       MRN: [id number]       YOB: 1945       Age: 78 y.o. Date of admission:  1/19/2023    Date of discharge:  1/26/2023    Primary care provider: Dr. Nyla Clark MD    Admitting provider:  Sera Kaiser MD    Discharging provider:  Felecia Saucedo 91.: [phone]. If unavailable, call [phone] and ask the  to page the triage hospitalist.    Consultations  IP CONSULT TO NEUROLOGY    Procedures  * No surgery found *    Discharge destination: home with [facility]. The patient is stable for discharge. Admission diagnosis  Seizures ([facility] [address] [R56.9]    Current Discharge Medication List        START taking these medications    Details   levETIRAcetam (KEPPRA) 750 mg tablet Take 1 Tablet by mouth every twelve (12) hours. Qty: 60 Tablet, Refills: 0  Start date: 1/26/2023      Blood-Glucose Meter monitoring kit Use as needed  Qty: 1 Kit, Refills: 0  Start date: 1/26/2023      glucose blood VI test strips (blood glucose test) strip Use four times a day as needed  Qty: 100 Strip, Refills: 0  Start date: 1/26/2023      lancets 17 gauge misc Use four times a day as needed  Qty: 100 Each, Refills: 0  Start date: 1/26/2023      insulin glargine (Lantus Solostar U-100 Insulin) 100 unit/mL (3 mL) inpn 16 Units by SubCUTAneous route nightly. Qty: 1 Adjustable Dose Pre-filled Pen Syringe, Refills: 1  Start date: 1/26/2023      Insulin Needles, Disposable, (ReliOn Pen Needles) 32 gauge x 5/32\" ndle Use daily  Qty: 1 Pen Needle, Refills: 1  Start date: 1/26/2023      insulin lispro (HumaLOG KwikPen Insulin) 100 unit/mL kwikpen 5 Units by SubCUTAneous route daily.  Once daily with largest meal of day  Qty: 1 Adjustable Dose Pre-filled Pen Syringe, Refills: 0  Start date: 1/26/2023           CONTINUE these medications which have CHANGED    Details losartan (COZAAR) 100 mg tablet Take 0.5 Tablets by mouth daily. Indications: high blood pressure  Qty: 30 Tablet, Refills: 0  Start date: 1/26/2023           CONTINUE these medications which have NOT CHANGED    Details   acetaminophen (TylenoL) 325 mg tablet Take 500 mg by mouth every four (4) hours as needed for Pain. atorvastatin (LIPITOR) 40 mg tablet Take 40 mg by mouth daily. metFORMIN (GLUCOPHAGE) 850 mg tablet Take 850 mg by mouth two (2) times daily (with meals). levothyroxine (SYNTHROID) 100 mcg tablet Take 125 mcg by mouth Daily (before breakfast). Indications: a condition with low thyroid hormone levels      furosemide (LASIX) 40 mg tablet Take 40 mg by mouth daily. carvediloL (COREG) 12.5 mg tablet Take 12.5 mg by mouth once over twenty-four (24) hours. Follow-up Information       Follow up With Specialties Details Why Contact Preethi Alarcon MD Internal Medicine Physician Follow up in 1 week(s)  [facility] [phone] [facility] Call If not contacted in 24 hours [phone]            Final discharge diagnoses and brief hospital course  Per HPI:\"Javi Lua is a 78 y.o. female initially presented to outside hospital due to  change mental status. Last know well time had been ~ 0800 am. Pt was found unresponsive at home with no evidence of trauma. + tremors and gaze was to the right. Glucose was 1157 lactic acid was 2.38. Loaded with keppra,  intubated and transferred to [facility] [place] for further care 1/19. Patient is not a very good historian, unable to provide much history leading up to current events. She reports she stopped taking her diabetes medicine because her doctor gave her medicines that were making her blood sugar \"too high\". She admits to taking her blood sugar the morning of her seizure event and says it was ~1100.   When asked about other blood sugars, she reports she does not know. She is unable to recount when the last time is that she went to her physician and again, states that he was giving her inappropriate medications. Hospitalist were consulted for medical management and to evaluate for downgrade to the medical floor. Patient was seen and examined, she was lying in bed in no acute distress, reports she has a mildly sore throat Denies headaches, dizziness, chest pain, chest pressure, shortness of breath. Denies any GI complaints such as nausea, vomiting, diarrhea or constipation and has no dysuria. Patient looks medically stable for downgrade to medical floor\"    New onset Seizure:   - Possibly due to severe hyperglycemia/HHS. - CT head negative  - on Keppra  - EEG showed periodic appearing blunted sharp wave activity on the right, indicating a potential seizure focus but no electrographic or clinical seizures were noted. - MRI shows no acute intracranial process. Moderate chronic microvascular change and mild cerebral atrophy. No intracranial mass, hemorrhage or evidence of acute infarction. - seizure precatuion,   - appreciate Neurology input      HHS   - Occurring in the setting of poorly controlled DM2:   - DKA protocol insulin drip, has been transitioned to Lantus now with sliding scale insulin. - DM education   - Hemoglobin A1c >14  - Son was educated at bedside by DM nurse on discharged regarding Insulin administration and blood glucose checks      Toxic metabolic encephalopathy: resolved   - Due to Seizure/HHS/Delirium     Pseudohyponatremia: resolved   - Corrected  when adjusted for glucose  - Continue to trend BMP     JORGE: cr improved   - likely due to prerenal state, resolving  - Received aggressive fluid resuscitation  - monitor renal function     Discharge recommendations  PCP in 1 week   Monitor BG   TSH in 3-4 weeks   Neurology f/u in 3-4 weeks     Discharge plan explained to patient and her son Radha Ness on phone.  They understood and agreed     Physical examination at discharge  Visit Vitals  BP (!) 150/80 (BP 1 Location: Right upper arm, BP Patient Position: Lying)   Pulse 82   Temp 98 °F (36.7 °C)   Resp 18   Ht 5' 3\" (1.6 m)   Wt 87.5 kg (192 lb 14.4 oz)   SpO2 100%   BMI 34.17 kg/m²     Constitutional:  No acute distress, cooperative, pleasant    ENT:  Oral mucosa moist, oropharynx benign. Resp:  CTA bilaterally. No wheezing/rhonchi/rales. No accessory muscle use. CV:  Regular rhythm, normal rate, no murmurs, gallops, rubs    GI:  Soft, non distended, non tender. normoactive bowel sounds, no hepatosplenomegaly     Musculoskeletal:  No edema,     Neurologic:  Conscious and alert, oriented to place and person, Moves all extremities. Pertinent imaging studies:    Per EMR     Recent Labs     01/26/23  0007 01/25/23 0419 01/24/23  0030   WBC 9.2 9.8 11.9*   HGB 10.7* 10.8* 10.1*   HCT 33.6* 34.6* 31.1*    320 289     Recent Labs     01/26/23  0007 01/25/23 0419 01/24/23  0030    136 136   K 4.1 4.0 3.6    106 103   CO2 24 25 28   BUN 13 17 18   CREA 1.04* 1.08* 1.12*   * 120* 66   CA 8.6 8.8 8.7   MG  --  1.6 2.0   PHOS  --  2.5* 2.4*     No results for input(s): AP, TBIL, TP, ALB, GLOB, GGT, AML, LPSE in the last 72 hours. No lab exists for component: SGOT, GPT, AMYP, HLPSE  No results for input(s): INR, PTP, APTT, INREXT in the last 72 hours. No results for input(s): FE, TIBC, PSAT, FERR in the last 72 hours. No results for input(s): PH, PCO2, PO2 in the last 72 hours. No results for input(s): CPK, CKMB in the last 72 hours. No lab exists for component: TROPONINI  No components found for: [place]    Chronic Diagnoses:    Problem List as of 1/26/2023 Never Reviewed            Codes Class Noted - Resolved    [facility] ICD-10-CM: R56.9  ICD-9-CM: 780.39  1/19/2023 - Present           Time spent on discharge related activities today greater than 30 minutes.       Signed:  Brianne Stringer Zunilda Tristan MD                 Hospitalist, Internal Medicine      Cc: Jaylin Gloria MD

## 2023-01-26 NOTE — PROGRESS NOTES
Physician Progress Note      Buddy Bender  CSN #:                  [id number]  :                       1945  ADMIT DATE:       2023 3:57 PM  [address] [place] DATE:  RESPONDING  PROVIDER #:        Janann Babinski MD Gwenda Barges MD          QUERY TEXT:    Pt admitted with seizures. Pt noted to have JORGE. If possible, please document in the progress notes and discharge summary if you are evaluating and/or treating any of the following:  [Acute kidney injury  with out acute tubular necrosis::This patient is in Acute kidney injury  without acute tubular necrosis.]]    The medical record reflects the following:  Risk Factors: JORGE/ hypotension  Clinical Indicators:    Spo2 dropped to 70's with hypotension      23 20:42  BUN: 26 (H)  Creatinine: 2.05 (H)  eGFR: 25 (L)    23 00:07  BUN: 13  Creatinine: 1.04 (H)  eGFR: 56 (L)    Treatment: slow resolution of renal functions labs, 3500 cc IV fluid bolus    Thank you,  Serena Baumgarten RN, CDI, CRCR, CCDS  Certified Clinical Documentation   [phone]  You can also contact me via [address] [place] [address].       Defined by Kidney Disease Improving Global Outcomes (KDIGO) clinical practice guideline for acute kidney injury:  -Increase in SCr by greater than or equal to 0.3 mg/dl within 48?hours; or  -Increase or decrease in SCr to greater than or equal to 1.5 times baseline, which is known or presumed to have occurred within the prior 7 days; or  -Urine volume < 0.5ml/kg/h for 6 hours  Options provided:  -- Acute kidney injury  with acute tubular necrosis  -- Other - I will add my own diagnosis  -- Disagree - Not applicable / Not valid  -- Disagree - Clinically unable to determine / Unknown  -- Refer to Clinical Documentation Reviewer    PROVIDER RESPONSE TEXT:    Acute Kidney injury    Query created by: Geovani Fox on 2023 2:28 PM      Electronically signed by:  Janann Babinski MD Gwenda Barges MD 2023 3:24 PM

## 2023-01-26 NOTE — DISCHARGE INSTRUCTIONS
Please bring this form with you to show your primary care provider at your follow-up appointment. Primary care provider:   @[email or web address]@    Discharging provider:  Jessica Nagy MD    You have been admitted to the hospital with the following diagnoses:  Seizures ([address] [R56.9]    FOLLOW-UP CARE RECOMMENDATIONS:    APPOINTMENTS:  Follow-up with primary care provider,  [unfilled]  -  Please call [unfilled] shortly after discharge to set up an appointment to be seen in  1 week      FOLLOW-UP TESTS recommended: Blood glucose 1-4 times a day    PENDING TEST RESULTS:  At the time of your discharge the following test results are still pending: none   Please make sure you review these results with your outpatient follow-up provider(s). SYMPTOMS to watch for: chest pain, shortness of breath, fever, chills, nausea, vomiting, diarrhea, change in mentation, falling, weakness, bleeding. DIET/what to eat:  Diabetic Diet    ACTIVITY:  Activity as tolerated    What to do if new or unexpected symptoms occur? If you experience any of the above symptoms (or should other concerns or questions arise after discharge) please call your primary care physician. Return to the emergency room if you cannot get hold of your doctor. It is very important that you keep your follow-up appointment(s). Please bring discharge papers, medication list (and/or medication bottles) to your follow-up appointments for review by your outpatient provider(s). Please check the list of medications and be sure it includes every medication (even non-prescription medications) that your provider wants you to take. It is important that you take the medication exactly as they are prescribed. Keep your medication in the bottles provided by the pharmacist and keep a list of the medication names, dosages, and times to be taken in your wallet. Do not take other medications without consulting your doctor.    If you have any questions about your medications or other instructions, please talk to your nurse or care provider before you leave the hospital.    I understand that if any problems occur once I am at home I am to contact my physician. These instructions were explained to me and I had the opportunity to ask questions.

## 2023-07-27 ENCOUNTER — HOSPITAL ENCOUNTER (OUTPATIENT)
Facility: HOSPITAL | Age: 78
Discharge: HOME | End: 2023-07-27
Payer: MEDICARE

## 2023-07-27 DIAGNOSIS — R09.89 GLOBUS SENSATION: ICD-10-CM

## 2023-07-27 PROCEDURE — 2500000003 HC RX 250 WO HCPCS: Performed by: INTERNAL MEDICINE

## 2023-07-27 PROCEDURE — 6370000000 HC RX 637 (ALT 250 FOR IP): Performed by: INTERNAL MEDICINE

## 2023-07-27 PROCEDURE — 74220 X-RAY XM ESOPHAGUS 1CNTRST: CPT

## 2023-07-27 RX ADMIN — BARIUM SULFATE 1 TABLET: 700 TABLET ORAL at 12:17

## 2023-07-27 RX ADMIN — BARIUM SULFATE 70 ML: 980 POWDER, FOR SUSPENSION ORAL at 12:17

## 2023-07-27 RX ADMIN — BARIUM SULFATE 177.5 ML: 0.6 SUSPENSION ORAL at 12:17

## 2023-07-27 RX ADMIN — ANTACID/ANTIFLATULENT 1 EACH: 380; 550; 10; 10 GRANULE, EFFERVESCENT ORAL at 12:17

## 2025-05-14 ENCOUNTER — HOSPITAL ENCOUNTER (OUTPATIENT)
Facility: HOSPITAL | Age: 80
Discharge: HOME | End: 2025-05-17
Attending: INTERNAL MEDICINE
Payer: MEDICARE

## 2025-05-14 DIAGNOSIS — N18.9 CHRONIC KIDNEY DISEASE, UNSPECIFIED CKD STAGE: ICD-10-CM

## 2025-05-14 PROCEDURE — 76770 US EXAM ABDO BACK WALL COMP: CPT
